# Patient Record
Sex: MALE | Race: WHITE | NOT HISPANIC OR LATINO | Employment: OTHER | URBAN - METROPOLITAN AREA
[De-identification: names, ages, dates, MRNs, and addresses within clinical notes are randomized per-mention and may not be internally consistent; named-entity substitution may affect disease eponyms.]

---

## 2017-02-17 DIAGNOSIS — I25.118 CORONARY ARTERY DISEASE INVOLVING NATIVE CORONARY ARTERY OF NATIVE HEART WITH OTHER FORM OF ANGINA PECTORIS (H): ICD-10-CM

## 2017-04-18 DIAGNOSIS — I25.118 CORONARY ARTERY DISEASE INVOLVING NATIVE CORONARY ARTERY OF NATIVE HEART WITH OTHER FORM OF ANGINA PECTORIS (H): ICD-10-CM

## 2017-04-18 RX ORDER — METOPROLOL SUCCINATE 25 MG/1
25 TABLET, EXTENDED RELEASE ORAL AT BEDTIME
Qty: 90 TABLET | Refills: 1 | Status: SHIPPED | OUTPATIENT
Start: 2017-04-18 | End: 2017-09-14

## 2017-04-26 ENCOUNTER — PRE VISIT (OUTPATIENT)
Dept: CARDIOLOGY | Facility: CLINIC | Age: 77
End: 2017-04-26

## 2017-04-26 DIAGNOSIS — I10 ESSENTIAL HYPERTENSION, BENIGN: ICD-10-CM

## 2017-04-26 DIAGNOSIS — I25.118 CORONARY ARTERY DISEASE INVOLVING NATIVE CORONARY ARTERY OF NATIVE HEART WITH OTHER FORM OF ANGINA PECTORIS (H): ICD-10-CM

## 2017-04-26 PROBLEM — E78.00 PURE HYPERCHOLESTEROLEMIA: Status: ACTIVE | Noted: 2017-04-26

## 2017-04-26 RX ORDER — ATORVASTATIN CALCIUM 80 MG/1
80 TABLET, FILM COATED ORAL AT BEDTIME
Qty: 90 TABLET | Refills: 1 | Status: SHIPPED | OUTPATIENT
Start: 2017-04-26 | End: 2017-09-14

## 2017-05-01 ENCOUNTER — TELEPHONE (OUTPATIENT)
Dept: FAMILY MEDICINE | Facility: CLINIC | Age: 77
End: 2017-05-01

## 2017-05-01 NOTE — TELEPHONE ENCOUNTER
Dr Martínez please advise.     Henrry from dental office calling to ask if patient needs to continue with RX abx for dental prophylaxis.     Patient reports joint replacement x 7 years ago. No listed in epic.   2001:   Rt knee arthroscopy and menisectomy    Need to return call to dental office.   [phone]    Damaris Whitley RN, BSN

## 2017-05-02 NOTE — TELEPHONE ENCOUNTER
Dental office is calling back and they need a letter stating that  He no longer needs the meds for dental work.  Please fax to #[phone]  Dr Pierre dental office.

## 2017-05-02 NOTE — TELEPHONE ENCOUNTER
Left detailed message of below on Dental voicemail, and to call back if further questions.  Whitney Echeverria RN

## 2017-05-02 NOTE — TELEPHONE ENCOUNTER
Let him know these are the ADA guidedlines, [employer] dental [employer]n, so he should simply follow their guidelines    Thanks    Zac Martínez M.D.

## 2017-05-15 ENCOUNTER — HOSPITAL ENCOUNTER (OUTPATIENT)
Dept: CARDIOLOGY | Facility: CLINIC | Age: 77
Discharge: HOME | End: 2017-05-15
Attending: INTERNAL MEDICINE | Admitting: INTERNAL MEDICINE
Payer: COMMERCIAL

## 2017-05-15 DIAGNOSIS — R10.13 EPIGASTRIC DISCOMFORT: ICD-10-CM

## 2017-05-15 DIAGNOSIS — R94.39 ABNORMAL CARDIOVASCULAR STRESS TEST: ICD-10-CM

## 2017-05-15 DIAGNOSIS — I10 ESSENTIAL HYPERTENSION WITH GOAL BLOOD PRESSURE LESS THAN 140/90: ICD-10-CM

## 2017-05-15 DIAGNOSIS — E78.00 PURE HYPERCHOLESTEROLEMIA: ICD-10-CM

## 2017-05-15 DIAGNOSIS — I10 ESSENTIAL HYPERTENSION, BENIGN: ICD-10-CM

## 2017-05-15 DIAGNOSIS — I25.118 CORONARY ARTERY DISEASE INVOLVING NATIVE CORONARY ARTERY OF NATIVE HEART WITH OTHER FORM OF ANGINA PECTORIS (H): ICD-10-CM

## 2017-05-15 DIAGNOSIS — J44.9 CHRONIC OBSTRUCTIVE PULMONARY DISEASE, UNSPECIFIED COPD TYPE (H): ICD-10-CM

## 2017-05-15 DIAGNOSIS — J47.9 BRONCHIECTASIS WITHOUT COMPLICATION (H): ICD-10-CM

## 2017-05-15 LAB
ALT SERPL W P-5'-P-CCNC: <5 U/L (ref 5–30)
ANION GAP SERPL CALCULATED.3IONS-SCNC: 12.8 MMOL/L (ref 6–17)
BUN SERPL-MCNC: 16 MG/DL (ref 7–30)
CALCIUM SERPL-MCNC: 9 MG/DL (ref 8.5–10.5)
CHLORIDE SERPL-SCNC: 105 MMOL/L (ref 98–107)
CHOLEST SERPL-MCNC: 178 MG/DL
CO2 SERPL-SCNC: 25 MMOL/L (ref 23–29)
CREAT SERPL-MCNC: 0.97 MG/DL (ref 0.7–1.3)
GFR SERPL CREATININE-BSD FRML MDRD: 75 ML/MIN/1.7M2
GLUCOSE SERPL-MCNC: 114 MG/DL (ref 70–105)
HDLC SERPL-MCNC: 64 MG/DL
LDLC SERPL CALC-MCNC: 98 MG/DL
NONHDLC SERPL-MCNC: 114 MG/DL
POTASSIUM SERPL-SCNC: 3.8 MMOL/L (ref 3.5–5.1)
SODIUM SERPL-SCNC: 139 MMOL/L (ref 136–145)
TRIGL SERPL-MCNC: 82 MG/DL

## 2017-05-15 PROCEDURE — 80061 LIPID PANEL: CPT | Performed by: INTERNAL MEDICINE

## 2017-05-15 PROCEDURE — A9502 TC99M TETROFOSMIN: HCPCS | Performed by: INTERNAL MEDICINE

## 2017-05-15 PROCEDURE — 78452 HT MUSCLE IMAGE SPECT MULT: CPT

## 2017-05-15 PROCEDURE — 78452 HT MUSCLE IMAGE SPECT MULT: CPT | Mod: 26 | Performed by: INTERNAL MEDICINE

## 2017-05-15 PROCEDURE — 36415 COLL VENOUS BLD VENIPUNCTURE: CPT | Performed by: INTERNAL MEDICINE

## 2017-05-15 PROCEDURE — 80048 BASIC METABOLIC PNL TOTAL CA: CPT | Performed by: INTERNAL MEDICINE

## 2017-05-15 PROCEDURE — 93018 CV STRESS TEST I&R ONLY: CPT | Performed by: INTERNAL MEDICINE

## 2017-05-15 PROCEDURE — 25000128 H RX IP 250 OP 636: Performed by: INTERNAL MEDICINE

## 2017-05-15 PROCEDURE — 34300033 ZZH RX 343: Performed by: INTERNAL MEDICINE

## 2017-05-15 PROCEDURE — 93016 CV STRESS TEST SUPVJ ONLY: CPT | Performed by: INTERNAL MEDICINE

## 2017-05-15 PROCEDURE — 84460 ALANINE AMINO (ALT) (SGPT): CPT | Performed by: INTERNAL MEDICINE

## 2017-05-15 RX ORDER — REGADENOSON 0.08 MG/ML
0.4 INJECTION, SOLUTION INTRAVENOUS ONCE
Status: COMPLETED | OUTPATIENT
Start: 2017-05-15 | End: 2017-05-15

## 2017-05-15 RX ORDER — ACYCLOVIR 200 MG/1
0-1 CAPSULE ORAL
Status: DISCONTINUED | OUTPATIENT
Start: 2017-05-15 | End: 2017-05-16 | Stop reason: HOSPADM

## 2017-05-15 RX ORDER — AMINOPHYLLINE 25 MG/ML
50-100 INJECTION, SOLUTION INTRAVENOUS
Status: DISCONTINUED | OUTPATIENT
Start: 2017-05-15 | End: 2017-05-16 | Stop reason: HOSPADM

## 2017-05-15 RX ORDER — ALBUTEROL SULFATE 90 UG/1
2 AEROSOL, METERED RESPIRATORY (INHALATION) EVERY 5 MIN PRN
Status: DISCONTINUED | OUTPATIENT
Start: 2017-05-15 | End: 2017-05-16 | Stop reason: HOSPADM

## 2017-05-15 RX ADMIN — TETROFOSMIN 12.16 MCI.: 0.23 INJECTION, POWDER, LYOPHILIZED, FOR SOLUTION INTRAVENOUS at 10:56

## 2017-05-15 RX ADMIN — REGADENOSON 0.4 MG: 0.08 INJECTION, SOLUTION INTRAVENOUS at 10:54

## 2017-05-15 RX ADMIN — TETROFOSMIN 4.26 MCI.: 0.23 INJECTION, POWDER, LYOPHILIZED, FOR SOLUTION INTRAVENOUS at 09:18

## 2017-05-17 ENCOUNTER — OFFICE VISIT (OUTPATIENT)
Dept: CARDIOLOGY | Facility: CLINIC | Age: 77
End: 2017-05-17
Attending: INTERNAL MEDICINE
Payer: COMMERCIAL

## 2017-05-17 VITALS
SYSTOLIC BLOOD PRESSURE: 114 MMHG | HEART RATE: 72 BPM | BODY MASS INDEX: 29.54 KG/M2 | DIASTOLIC BLOOD PRESSURE: 64 MMHG | WEIGHT: 211 LBS | HEIGHT: 71 IN

## 2017-05-17 DIAGNOSIS — J47.9 BRONCHIECTASIS WITHOUT COMPLICATION (H): ICD-10-CM

## 2017-05-17 DIAGNOSIS — I10 ESSENTIAL HYPERTENSION WITH GOAL BLOOD PRESSURE LESS THAN 140/90: ICD-10-CM

## 2017-05-17 DIAGNOSIS — R10.13 EPIGASTRIC DISCOMFORT: ICD-10-CM

## 2017-05-17 DIAGNOSIS — E78.00 PURE HYPERCHOLESTEROLEMIA: ICD-10-CM

## 2017-05-17 DIAGNOSIS — I25.118 CORONARY ARTERY DISEASE INVOLVING NATIVE CORONARY ARTERY OF NATIVE HEART WITH OTHER FORM OF ANGINA PECTORIS (H): ICD-10-CM

## 2017-05-17 DIAGNOSIS — J44.9 CHRONIC OBSTRUCTIVE PULMONARY DISEASE, UNSPECIFIED COPD TYPE (H): ICD-10-CM

## 2017-05-17 DIAGNOSIS — R94.39 ABNORMAL CARDIOVASCULAR STRESS TEST: ICD-10-CM

## 2017-05-17 DIAGNOSIS — I10 ESSENTIAL HYPERTENSION, BENIGN: ICD-10-CM

## 2017-05-17 PROCEDURE — 99214 OFFICE O/P EST MOD 30 MIN: CPT | Performed by: INTERNAL MEDICINE

## 2017-05-17 NOTE — MR AVS SNAPSHOT
After Visit Summary   5/17/2017    Romel Bonilla    MRN: [id number]           Patient Information     Date Of Birth          1940        Visit Information        Provider Department      5/17/2017 8:15 AM Awais Mathew MD [facility]        Today's Diagnoses     Essential hypertension with goal blood pressure less than 140/90        Coronary artery disease involving native coronary artery of native heart with other form of angina pectoris (H)        Epigastric discomfort        Bronchiectasis without complication (H)        Pure hypercholesterolemia        Abnormal cardiovascular stress test        HYPERTENSION        Chronic obstructive pulmonary disease, unspecified COPD type (H)           Follow-ups after your visit        Additional Services     Follow-Up with Cardiologist                 Future tests that were ordered for you today     Open Future Orders        Priority Expected Expires Ordered    Basic metabolic panel Routine 9/14/2017 5/17/2018 5/17/2017    Lipid Profile Routine 9/14/2017 5/17/2018 5/17/2017    ALT Routine 9/14/2017 5/17/2018 5/17/2017    Follow-Up with Cardiologist Routine 9/14/2017 5/17/2018 5/17/2017            Who to contact     If you have questions or need follow up information about today's clinic visit or your schedule please contact [facility] directly at [phone].  Normal or non-critical lab and imaging results will be communicated to you by MyChart, letter or phone within 4 business days after the clinic has received the results. If you do not hear from us within 7 days, please contact the clinic through MyChart or phone. If you have a critical or abnormal lab result, we will notify you by phone as soon as possible.  Submit refill requests through [employer] or call your pharmacy and they will forward the refill request to us. Please allow 3 business days for your  "refill to be completed.          Additional Information About Your Visit        Expahart Information     [facility] [employer] [employer] gives you secure access to your electronic health record. If you see a primary care provider, you can also send messages to your care team and make appointments. If you have questions, please call your primary care clinic.  If you do not have a primary care provider, please call [phone] and they will assist you.        Care EveryWhere ID     This is your Care EveryWhere ID. This could be used by other organizations to access your [place] medical records  [id number]        Your Vitals Were     Pulse Height BMI (Body Mass Index)             72 1.803 m (5' 11\") 29.43 kg/m2          Blood Pressure from Last 3 Encounters:   05/17/17 114/64   10/20/16 127/79   09/29/16 102/70    Weight from Last 3 Encounters:   05/17/17 95.7 kg (211 lb)   10/20/16 96.6 kg (213 lb)   09/29/16 97.1 kg (214 lb)              We Performed the Following     Follow-Up with Cardiologist        Primary Care Provider Office Phone # Fax #    Zac Martínez [phone]       [facility] [address]  [facility] [address]        Thank you!     Thank you for choosing [facility] [place]  for your care. Our goal is always to provide you with excellent care. Hearing back from our patients is one way we can continue to improve our services. Please take a few minutes to complete the written survey that you may receive in the mail after your visit with us. Thank you!             Your Updated Medication List - Protect others around you: Learn how to safely use, store and throw away your medicines at www.disposemymeds.org.          This list is accurate as of: 5/17/17  9:23 AM.  Always use your most recent med list.                   Brand Name Dispense Instructions for use    * albuterol 1.25 MG/3ML nebulizer solution    ACCUNEB     Take 1 vial by nebulization " every 6 hours as needed for shortness of breath / dyspnea or wheezing       * albuterol 108 (90 BASE) MCG/ACT Inhaler    PROAIR HFA/PROVENTIL HFA/VENTOLIN HFA     Inhale 2 puffs into the lungs every 4 hours as needed for shortness of breath / dyspnea or wheezing       ASPIRIN NOT PRESCRIBED    INTENTIONAL    0 each    Antiplatelet medication not prescribed intentionally due to Allergy       atorvastatin 80 MG tablet    LIPITOR    90 tablet    Take 1 tablet (80 mg) by mouth At Bedtime       calcium + D 600-200 MG-UNIT Tabs   Generic drug:  calcium carbonate-vitamin D     100 tablet    Take 1 tablet by mouth 2 times daily.       fluticasone 250 MCG/BLIST Aepb Inhaler    FLOVENT DISKUS     Inhale 2 puffs into the lungs every 12 hours       fluticasone 50 MCG/ACT spray    FLONASE    1 Bottle    Spray 1-2 sprays into both nostrils daily       metoprolol 25 MG 24 hr tablet    TOPROL XL    90 tablet    Take 1 tablet (25 mg) by mouth At Bedtime       MULTIVITAMIN TABS   OR      2 tabs daily       nitroglycerin 0.4 MG sublingual tablet    NITROSTAT    25 tablet    Place 1 tablet (0.4 mg) under the tongue every 5 minutes as needed for chest pain if you are still having symptoms after 3 doses (15 minutes) call 911.       OMEGA-3 FISH OIL PO      Take 1 g by mouth daily       omeprazole 20 MG CR capsule    priLOSEC     Take 20 mg by mouth daily as needed       ranitidine 150 MG tablet    ZANTAC     Take 150 mg by mouth nightly as needed       ticagrelor 90 MG tablet    BRILINTA    180 tablet    Take 1 tablet (90 mg) by mouth 2 times daily       * Notice:  This list has 2 medication(s) that are the same as other medications prescribed for you. Read the directions carefully, and ask your doctor or other care provider to review them with you.

## 2017-05-17 NOTE — LETTER
2017    Zac Martínez MD  [address]  [facility] [address]    RE: Romel Bonilla       Dear Colleague,    I had the pleasure of seeing Romel Bonilla in the [facility].    The patient is a 76-year-old, mildly overweight white male who presents to Cardiology Clinic for followup of a diagnosis of ischemic heart disease after undergoing cardiac catheterization and coronary angiography.  He had a 99% ostial right coronary artery, which required rotablation as well as aggressive PTCI and stent placement.  He has felt well since his last clinic visit with no recurrent symptoms of chest discomfort, shortness of breath, dizziness, palpitations, nausea, vomiting, diaphoresis or syncope.  He had previously an abnormal Cardiolite stress test, which shows some symptoms of dyspnea on exertion.  Risk factors are positive for a previous history of cigarette smoking, hypercholesterolemia and hypertension.  Negative for diabetes mellitus or family history of premature coronary artery disease.  He has snored in his sleep with weight ranging in the 220-230 range, but he has not been evaluated for sleep apnea and has had a significant weight loss of approximately 20 pounds mostly since his wife , who provided the cooking.  There is also a chronic history of bronchiectasis and COPD, for which he is treated with inhaler therapy and intermittent steroid administration as well as IV antibiotics.  Prior to cardiac intervention, he did have increase in dyspnea on exertion with epigastric heaviness and substernal discomfort that did not radiate to the back, shoulders and chest.  The cardiac catheterization led to right coronary artery intervention with mild to moderate disease of the left coronary system.  Intact left ventricular function has been noted in the past.  Lexiscan Cardiolite stress test performed this month demonstrated no evidence of ischemia or infarct.  Ejection fraction  was 54% with no significant regional wall motion abnormality noted.  No significant EKG changes were noted with the Lexiscan.  Previous echocardiogram done last year demonstrated a normal left ventricular systolic pressure with an ejection fraction 55%-60% with mild trileaflet aortic valve sclerosis, trace to mild aortic insufficiency with mild aortic root dilatation and mild ascending aortic dilatation.      MEDICATIONS:   1.  Atorvastatin 80 mg a day.   2.  Metoprolol XL 25 mg a day.   3.  Brilinta 90 mg twice a day.   4.  Aspirin not known because of intolerance.     5.  Flonase as directed.     6.  Flovent as directed.   7.  Nitroglycerin 0.4 mg sublingual p.r.n., which has not been used.   8.  Omeprazole 20 mg a day.   9.  Ranitidine 150 mg at night as needed.   10.  Albuterol nebulizer as directed.     11.  Albuterol inhaler as directed.     12.  Omega 3 fatty acids 1 g per day.   13.  Calcium carbonate with vitamin D 1 tablet twice a day.   14.  Multivitamins 2 tablets a day.      LABORATORY DATA:  Cholesterol 178, HDL 64, LDL 98, triglycerides 82.  Sodium 139, potassium 3.8, BUN 16, creatinine 0.97.  ALT less than 5.      The patient presents to Cardiology Clinic for followup of ischemic heart disease, hypertension and hyperlipidemia.      PHYSICAL EXAMINATION:   VITAL SIGNS:  Blood pressure is 114/64 with a heart rate of 70-80 and regular.  Weight is 211 pounds, which is down approximately 3 pounds since last clinic visit.   NECK:  Without jugular venous distention, carotid bruit or palpable thyroid.   CHEST:  Essentially clear to percussion and auscultation.  Decreased breath sounds at bases.  Prolonged expiratory phase and isolated scattered crackles.   CARDIAC:  Regular rhythm, soft S4 gallop, a 1/6 systolic murmur at the left sternal border radiating to the apex.  No diastolic murmur, rub or S3.   EXTREMITIES:  Without cyanosis or edema.      CLINICAL IMPRESSION:   1.  Stable cardiac condition.   2.   Ischemic heart disease, status post rotablation and PTCI with a stent placed in the ostial right coronary artery with moderate disease elsewhere.   3.  History of chronic bronchiectasis/chronic obstructive pulmonary disease with long cigarette smoking history.   4.  Hypertension, well controlled.   5.  Hyperlipidemia, not at goal due to dietary indiscretion.   6.  History of diffuse osteoarthritis.      DISCUSSION:  The patient has done well clinically with regard to his cardiac status since his last clinic visit with no symptoms of angina pectoris or congestive heart failure.  Serum lipids had been improved on high-dose Lipitor, but have subsequently worsened with an LDL in the 90s, presumably due to dietary indiscretion, primarily egg yolks.  Options to either add Zetia 5-10 mg a day or to be stricter with diet were offered to the patient, who would like to try diet first with a recheck later in the year.  He does not want to restart aspirin at this time.  Brilinta, which has become less expensive, he would prefer to continue at this time.  He will need close followup of serum lipids, basic metabolic panel and blood pressure.  He may be a candidate for a sleep evaluation later in the year and will continue aggressive therapy of his gastroesophageal reflux and COPD/bronchiectasis.  Overall, he appears stable.      RECOMMENDATIONS:   1.  Continue present medications.   2.  Close followup of serum lipids, basic metabolic panel and blood pressure.   3.  Aggressive pulmonary therapy.   4.  Aggressive GI therapy for GERD.   5.  Consider echocardiography later in the year to follow left ventricular function.   6.  Consider sleep study.   7.  Diet and exercise as tolerated, avoiding cholesterol-rich foods.   8.  Routine medical followup.   9.  Cardiology followup in 4-6 months.     Again, thank you for allowing me to participate in the care of your patient.      Sincerely,    Awais Mathew MD     [facility]  [facility]

## 2017-05-18 NOTE — PROGRESS NOTES
HISTORY OF PRESENT ILLNESS:  The patient is a 76-year-old, mildly overweight white male who presents to Cardiology Clinic for followup of a diagnosis of ischemic heart disease after undergoing cardiac catheterization and coronary angiography.  He had a 99% ostial right coronary artery, which required rotablation as well as aggressive PTCI and stent placement.  He has felt well since his last clinic visit with no recurrent symptoms of chest discomfort, shortness of breath, dizziness, palpitations, nausea, vomiting, diaphoresis or syncope.  He had previously an abnormal Cardiolite stress test, which shows some symptoms of dyspnea on exertion.  Risk factors are positive for a previous history of cigarette smoking, hypercholesterolemia and hypertension.  Negative for diabetes mellitus or family history of premature coronary artery disease.  He has snored in his sleep with weight ranging in the 220-230 range, but he has not been evaluated for sleep apnea and has had a significant weight loss of approximately 20 pounds mostly since his wife , who provided the cooking.  There is also a chronic history of bronchiectasis and COPD, for which he is treated with inhaler therapy and intermittent steroid administration as well as IV antibiotics.  Prior to cardiac intervention, he did have increase in dyspnea on exertion with epigastric heaviness and substernal discomfort that did not radiate to the back, shoulders and chest.  The cardiac catheterization led to right coronary artery intervention with mild to moderate disease of the left coronary system.  Intact left ventricular function has been noted in the past.  Lexiscan Cardiolite stress test performed this month demonstrated no evidence of ischemia or infarct.  Ejection fraction was 54% with no significant regional wall motion abnormality noted.  No significant EKG changes were noted with the Lexiscan.  Previous echocardiogram done last year demonstrated a normal left  ventricular systolic pressure with an ejection fraction 55%-60% with mild trileaflet aortic valve sclerosis, trace to mild aortic insufficiency with mild aortic root dilatation and mild ascending aortic dilatation.      MEDICATIONS:   1.  Atorvastatin 80 mg a day.   2.  Metoprolol XL 25 mg a day.   3.  Brilinta 90 mg twice a day.   4.  Aspirin not known because of intolerance.     5.  Flonase as directed.     6.  Flovent as directed.   7.  Nitroglycerin 0.4 mg sublingual p.r.n., which has not been used.   8.  Omeprazole 20 mg a day.   9.  Ranitidine 150 mg at night as needed.   10.  Albuterol nebulizer as directed.     11.  Albuterol inhaler as directed.     12.  Omega 3 fatty acids 1 g per day.   13.  Calcium carbonate with vitamin D 1 tablet twice a day.   14.  Multivitamins 2 tablets a day.      LABORATORY DATA:  Cholesterol 178, HDL 64, LDL 98, triglycerides 82.  Sodium 139, potassium 3.8, BUN 16, creatinine 0.97.  ALT less than 5.      The patient presents to Cardiology Clinic for followup of ischemic heart disease, hypertension and hyperlipidemia.      PHYSICAL EXAMINATION:   VITAL SIGNS:  Blood pressure is 114/64 with a heart rate of 70-80 and regular.  Weight is 211 pounds, which is down approximately 3 pounds since last clinic visit.   NECK:  Without jugular venous distention, carotid bruit or palpable thyroid.   CHEST:  Essentially clear to percussion and auscultation.  Decreased breath sounds at bases.  Prolonged expiratory phase and isolated scattered crackles.   CARDIAC:  Regular rhythm, soft S4 gallop, a 1/6 systolic murmur at the left sternal border radiating to the apex.  No diastolic murmur, rub or S3.   EXTREMITIES:  Without cyanosis or edema.      CLINICAL IMPRESSION:   1.  Stable cardiac condition.   2.  Ischemic heart disease, status post rotablation and PTCI with a stent placed in the ostial right coronary artery with moderate disease elsewhere.   3.  History of chronic bronchiectasis/chronic  obstructive pulmonary disease with long cigarette smoking history.   4.  Hypertension, well controlled.   5.  Hyperlipidemia, not at goal due to dietary indiscretion.   6.  History of diffuse osteoarthritis.      DISCUSSION:  The patient has done well clinically with regard to his cardiac status since his last clinic visit with no symptoms of angina pectoris or congestive heart failure.  Serum lipids had been improved on high-dose Lipitor, but have subsequently worsened with an LDL in the 90s, presumably due to dietary indiscretion, primarily egg yolks.  Options to either add Zetia 5-10 mg a day or to be stricter with diet were offered to the patient, who would like to try diet first with a recheck later in the year.  He does not want to restart aspirin at this time.  Brilinta, which has become less expensive, he would prefer to continue at this time.  He will need close followup of serum lipids, basic metabolic panel and blood pressure.  He may be a candidate for a sleep evaluation later in the year and will continue aggressive therapy of his gastroesophageal reflux and COPD/bronchiectasis.  Overall, he appears stable.      RECOMMENDATIONS:   1.  Continue present medications.   2.  Close followup of serum lipids, basic metabolic panel and blood pressure.   3.  Aggressive pulmonary therapy.   4.  Aggressive GI therapy for GERD.   5.  Consider echocardiography later in the year to follow left ventricular function.   6.  Consider sleep study.   7.  Diet and exercise as tolerated, avoiding cholesterol-rich foods.   8.  Routine medical followup.   9.  Cardiology followup in 4-6 months.      cc:   Zac [place] [address]         SHAE VALVERDE MD, [facility]             D: 2017 11:03   T: 2017 12:01   MT: [email or web address]      Name:     WESLEY SPARKS   MRN:      [id number]        Account:      [id number]   :      1940           Service Date:  05/17/2017      Document: [id number]

## 2017-07-26 ENCOUNTER — TELEPHONE (OUTPATIENT)
Dept: CARDIOLOGY | Facility: CLINIC | Age: 77
End: 2017-07-26

## 2017-07-26 NOTE — TELEPHONE ENCOUNTER
Patient called requesting a change in his appointment scheduled 9-6-17 with Dr. Mathew. Patient will be out of town that day. Transferred to scheduling for OV 9-25-17.

## 2017-08-23 DIAGNOSIS — I25.118 CORONARY ARTERY DISEASE INVOLVING NATIVE CORONARY ARTERY OF NATIVE HEART WITH OTHER FORM OF ANGINA PECTORIS (H): ICD-10-CM

## 2017-09-06 ENCOUNTER — PRE VISIT (OUTPATIENT)
Dept: CARDIOLOGY | Facility: CLINIC | Age: 77
End: 2017-09-06

## 2017-09-13 NOTE — PROGRESS NOTES
SUBJECTIVE:   Romel Bonilla is a 76 year old male who presents for Preventive Visit.    The patient is doing very well, he is walking reg, weight stable.  Using asmanex now, not seeing pulm but does have reg follow up with Dr. Gentile of urol and cards.  Per patient psa undetect this year.  NO chest pain or shortness of breath, using inhaler once a day and rare use of albuterol.  Occasionally some bm irreg for years, not new or changed.  No other c/o.  , lives [place] over 6 months of the year, [place].               Past Medical History:      Past Medical History:   Diagnosis Date     Anxiety      Aortic root dilation (H)      Ascending aorta dilatation (H)      Asthma     Dr. Lennox     COPD (chronic obstructive pulmonary disease) (H)     bronchiectasis on multiple inhalers      Coronary artery disease     Cath 3/14/16- 95% ostail RCA stenosis>>CECILIO placed, fu nuclear est 5/17 nl     Essential hypertension, benign     off medications with life style     GERD (gastroesophageal reflux disease)      Groin hematoma     right- 3/2016 post angio     Hoarseness      Hyperglycemia      Hyperlipidemia LDL goal <70      Lung nodule 2012    indeterminate-3 mm-needs 12 month follow up; fu done 3/16 at [facility] and benign, no fu needed     Obesity, unspecified      Osteoarthrosis, unspecified whether generalized or localized, unspecified site      Prostate CA (H) 2012    metastatic-guerita's score 7, seeds and xrt, now seeing Dr. Gentile     Urinary retention 2016    due to asa             Past Surgical History:      Past Surgical History:   Procedure Laterality Date     C NONSPECIFIC PROCEDURE  7/01    Rt knee arthroscopy and menisectomy     C NONSPECIFIC PROCEDURE  2000    RT HERNIA REPAIR     CATARACT IOL, RT/LT  2016     HEART CATH LEFT HEART CATH  3/14/16    95% ostail RCA stenosis>>CECILIO placed     INSERT RADIATION SEEDS PROSTATE  7/10/2012    seeds and xrt     ROTATOR CUFF REPAIR RT/LT  2016             Social  History:     Social History     Social History     Marital status:      Spouse name: N/A     Number of children: 4     Years of education: N/A     Occupational History     sold insurance      Social History Main Topics     Smoking status: Former Smoker     Packs/day: 1.00     Years: 20.00     Types: Cigarettes     Quit date: 1/26/1994     Smokeless tobacco: Never Used     Alcohol use 0.0 oz/week     0 Standard drinks or equivalent per week      Comment: occ beer     Drug use: No     Sexual activity: Yes     Partners: Female     Other Topics Concern     Caffeine Concern No     occ      Sleep Concern Yes     due to wife passing away a month ago     Stress Concern No     Weight Concern No     Special Diet No     Exercise No     not currently, starting cardiac rehab soon      Seat Belt Yes     Social History Narrative             Family History:   reviewed         Allergies:     Allergies   Allergen Reactions     Cefprozil Diarrhea     Aspirin      Urinary retention     Plavix [Clopidogrel] Itching             Medications:     Current Outpatient Prescriptions   Medication Sig Dispense Refill     mometasone (ASMANEX) 110 MCG/INH inhaler Inhale 1 puff into the lungs every evening 3 Inhaler 3     atorvastatin (LIPITOR) 80 MG tablet Take 1 tablet (80 mg) by mouth At Bedtime 90 tablet 3     metoprolol (TOPROL XL) 25 MG 24 hr tablet Take 1 tablet (25 mg) by mouth At Bedtime 90 tablet 3     fluticasone (FLONASE) 50 MCG/ACT spray Spray 1-2 sprays into both nostrils daily 1 Bottle 3     ticagrelor (BRILINTA) 90 MG tablet Take 1 tablet (90 mg) by mouth 2 times daily 180 tablet 1     albuterol (PROAIR HFA, PROVENTIL HFA, VENTOLIN HFA) 108 (90 BASE) MCG/ACT inhaler Inhale 2 puffs into the lungs every 4 hours as needed for shortness of breath / dyspnea or wheezing       Calcium Carbonate-Vitamin D (CALCIUM + D) 600-200 MG-UNIT per tablet Take 1 tablet by mouth 2 times daily. 100 tablet 12     MULTIVITAMIN TABS   OR 2 tabs  "daily       [DISCONTINUED] mometasone (ASMANEX) 110 MCG/INH inhaler Inhale 1 puff into the lungs every evening       [DISCONTINUED] atorvastatin (LIPITOR) 80 MG tablet Take 1 tablet (80 mg) by mouth At Bedtime 90 tablet 1     [DISCONTINUED] metoprolol (TOPROL XL) 25 MG 24 hr tablet Take 1 tablet (25 mg) by mouth At Bedtime 90 tablet 1     ASPIRIN NOT PRESCRIBED (INTENTIONAL) Antiplatelet medication not prescribed intentionally due to Allergy (Patient not taking: Reported on 9/14/2017) 0 each 0     nitroglycerin (NITROSTAT) 0.4 MG SL tablet Place 1 tablet (0.4 mg) under the tongue every 5 minutes as needed for chest pain if you are still having symptoms after 3 doses (15 minutes) call 911. (Patient not taking: Reported on 9/14/2017) 25 tablet 1     ranitidine (ZANTAC) 150 MG tablet Take 150 mg by mouth nightly as needed        Omega-3 Fatty Acids (OMEGA-3 FISH OIL PO) Take 1 g by mouth daily                 Review of Systems:   The 10 point Review of Systems is negative other than noted in the HPI           Physical Exam:   Blood pressure 127/64, pulse 67, temperature 98.2  F (36.8  C), temperature source Oral, height 5' 11\" (1.803 m), weight 211 lb (95.7 kg), SpO2 99 %.    Exam:  Constitutional: healthy appearing, alert and in no distress  Heent: Normocephalic. Head without obvious masses or lesions. PERRLDC, EOMI. Mouth exam within normal limits: tongue, mucous membranes, posterior pharynx all normal, no lesions or abnormalities seen.  Tm's and canals within normal limits bilaterally. Neck supple, no nuchal rigidity or masses. No supraclavicular, or cervical adenopathy. Thyroid symmetric, no masses.  Cardiovascular: Regular rate and rhythm, no murmer, rub or gallops.  JVP not elevated, no edema.  Carotids within normal limits bilaterally, no bruits.  Respiratory: Normal respiratory effort.  Lungs clear, normal flow, no wheezing or crackles.  Breasts: Normal bilaterally.  No masses or lesions.  Nipples within normal " limites.  No axillary lesions or nodes.  Gastrointestinal: Normal active bowel sounds.   Soft, not tender, no masses, guarding or rebound.  No hepatosplenomegaly.   Genitourinary: Rectal per urol  Musculoskeletal: extremities normal, no gross deformities noted.  Skin: no suspicious lesions or rashes   Neurologic: Mental status within normal limits.  Speech fluent.  No gross motor abnormalities and gait intact.  Psychiatric: mentation appears normal and affect normal.         Data:   Labs sent        Assessment:   1. Normal cpx  2. Copd/asthma, stable, call if changes  3. Ascvd, doing well on med therapy  4. Elevated cholesterol on high dose statin  5. Hypertension, no issues  6. Lung nodule, benign in follow up  7. Cap, gisella, follow up urol  8. Gerd, no issues  9. asc aortic dil, follow up cards  10. Urine retention with asa, off it, just on brilenta  11. hcm         Plan:   Flu shot  Exercise, diet  Letter with labs  Call if problems      Zac Martínez M.D.            Are you in the first 12 months of your Medicare Part B coverage?  No    Healthy Habits:    Do you get at least three servings of calcium containing foods daily (dairy, green leafy vegetables, etc.)? no    Amount of exercise or daily activities, outside of work: 7 day(s) per week    Problems taking medications regularly No    Medication side effects: No    Have you had an eye exam in the past two years? yes    Do you see a dentist twice per year? no    Do you have sleep apnea, excessive snoring or daytime drowsiness?no    COGNITIVE SCREEN  1) Repeat 3 items (Banana, Sunrise, Chair)    2) Clock draw:   3) 3 item recall:   Results: 3 items recalled: COGNITIVE IMPAIRMENT LESS LIKELY    Mini-CogTM Copyright S Norah. Licensed by the author for use in [facility]; reprinted with permission (dontae@.[facility]). All rights reserved.                    Reviewed and updated as needed this visit by clinical staff         Reviewed and updated as needed this  "visit by Provider      Social History   Substance Use Topics     Smoking status: Former Smoker     Packs/day: 1.00     Years: 20.00     Types: Cigarettes     Quit date: 1/26/1994     Smokeless tobacco: Never Used     Alcohol use 0.0 oz/week     0 Standard drinks or equivalent per week      Comment: occ beer       The patient does not drink >3 drinks per day nor >7 drinks per week.    Today's PHQ-2 Score:   PHQ-2 ( 1999 Pfizer) 10/20/2016 9/27/2016   Q1: Little interest or pleasure in doing things 0 0   Q2: Feeling down, depressed or hopeless 0 0   PHQ-2 Score 0 0         Do you feel safe in your environment - Yes    Do you have a Health Care Directive?: No: Advance care planning was reviewed with patient; patient declined at this time.      Current providers sharing in care for this patient include: Patient Care Team:  Zac Martínez MD as PCP - General (Internal Medicine)  Ti Gentile MD as MD (Urology)      Hearing impairment: Yes,     Ability to successfully perform activities of daily living: Yes, no assistance needed     Fall risk:         Home safety:        The following health maintenance items are reviewed in Epic and correct as of today:Health Maintenance   Topic Date Due     ADVANCE DIRECTIVE PLANNING Q5 YRS  02/01/2017     INFLUENZA VACCINE (SYSTEM ASSIGNED)  09/01/2017     FALL RISK ASSESSMENT  10/20/2017     LIPID SCREEN Q5 YR MALE (SYSTEM ASSIGNED)  05/15/2022     TETANUS IMMUNIZATION (SYSTEM ASSIGNED)  06/25/2022     PNEUMOCOCCAL  Completed       End of Life Planning:  Patient currently has an advanced directive: none, I rec it    COUNSELING:  Reviewed preventive health counseling, as reflected in patient instructions       Regular exercise       Healthy diet/nutrition        Estimated body mass index is 29.43 kg/(m^2) as calculated from the following:    Height as of 5/17/17: 5' 11\" (1.803 m).    Weight as of 5/17/17: 211 lb (95.7 kg).     reports that he quit smoking about 23 years " ago. His smoking use included Cigarettes. He has a 20.00 pack-year smoking history. He has never used smokeless tobacco.        Appropriate preventive services were discussed with this patient, including applicable screening as appropriate for cardiovascular disease, diabetes, osteopenia/osteoporosis, and glaucoma.  As appropriate for age/gender, discussed screening for colorectal cancer, prostate cancer, breast cancer, and cervical cancer. Checklist reviewing preventive services available has been given to the patient.    Reviewed patients plan of care and provided an AVS. The Basic Care Plan (routine screening as documented in Health Maintenance) for Romel meets the Care Plan requirement. This Care Plan has been established and reviewed with the Patient.    Counseling Resources:  ATP IV Guidelines  Pooled Cohorts Equation Calculator  Breast Cancer Risk Calculator  FRAX Risk Assessment  ICSI Preventive Guidelines  Dietary Guidelines for Americans, 2010  USDA's MyPlate  ASA Prophylaxis  Lung CA Screening    Zac Martínez MD  [facility]

## 2017-09-14 ENCOUNTER — OFFICE VISIT (OUTPATIENT)
Dept: FAMILY MEDICINE | Facility: CLINIC | Age: 77
End: 2017-09-14
Payer: COMMERCIAL

## 2017-09-14 VITALS
BODY MASS INDEX: 29.54 KG/M2 | HEART RATE: 67 BPM | DIASTOLIC BLOOD PRESSURE: 64 MMHG | OXYGEN SATURATION: 99 % | TEMPERATURE: 98.2 F | SYSTOLIC BLOOD PRESSURE: 127 MMHG | WEIGHT: 211 LBS | HEIGHT: 71 IN

## 2017-09-14 DIAGNOSIS — R91.1 LUNG NODULE: ICD-10-CM

## 2017-09-14 DIAGNOSIS — C61 PROSTATE CA (H): ICD-10-CM

## 2017-09-14 DIAGNOSIS — I77.810 ASCENDING AORTA DILATATION (H): ICD-10-CM

## 2017-09-14 DIAGNOSIS — I10 ESSENTIAL HYPERTENSION, BENIGN: ICD-10-CM

## 2017-09-14 DIAGNOSIS — K21.9 GASTROESOPHAGEAL REFLUX DISEASE WITHOUT ESOPHAGITIS: ICD-10-CM

## 2017-09-14 DIAGNOSIS — I25.10 CORONARY ARTERY DISEASE INVOLVING NATIVE CORONARY ARTERY OF NATIVE HEART WITHOUT ANGINA PECTORIS: ICD-10-CM

## 2017-09-14 DIAGNOSIS — I25.118 CORONARY ARTERY DISEASE INVOLVING NATIVE CORONARY ARTERY OF NATIVE HEART WITH OTHER FORM OF ANGINA PECTORIS (H): ICD-10-CM

## 2017-09-14 DIAGNOSIS — R49.0 RASPY VOICE: ICD-10-CM

## 2017-09-14 DIAGNOSIS — Z00.00 ROUTINE GENERAL MEDICAL EXAMINATION AT A HEALTH CARE FACILITY: Primary | ICD-10-CM

## 2017-09-14 DIAGNOSIS — J44.9 CHRONIC OBSTRUCTIVE PULMONARY DISEASE, UNSPECIFIED COPD TYPE (H): ICD-10-CM

## 2017-09-14 DIAGNOSIS — R73.9 HYPERGLYCEMIA: ICD-10-CM

## 2017-09-14 DIAGNOSIS — R09.81 CONGESTION OF PARANASAL SINUS: ICD-10-CM

## 2017-09-14 DIAGNOSIS — F41.9 ANXIETY: ICD-10-CM

## 2017-09-14 DIAGNOSIS — E78.00 PURE HYPERCHOLESTEROLEMIA: ICD-10-CM

## 2017-09-14 DIAGNOSIS — I10 BENIGN ESSENTIAL HYPERTENSION: ICD-10-CM

## 2017-09-14 DIAGNOSIS — Z23 NEED FOR PROPHYLACTIC VACCINATION AND INOCULATION AGAINST INFLUENZA: ICD-10-CM

## 2017-09-14 DIAGNOSIS — J45.30 MILD PERSISTENT ASTHMA WITHOUT COMPLICATION: ICD-10-CM

## 2017-09-14 DIAGNOSIS — R33.9 URINARY RETENTION: ICD-10-CM

## 2017-09-14 LAB
ALBUMIN SERPL-MCNC: 3.8 G/DL (ref 3.4–5)
ALP SERPL-CCNC: 87 U/L (ref 40–150)
ALT SERPL W P-5'-P-CCNC: 32 U/L (ref 0–70)
ANION GAP SERPL CALCULATED.3IONS-SCNC: 10 MMOL/L (ref 3–14)
AST SERPL W P-5'-P-CCNC: 21 U/L (ref 0–45)
BILIRUB SERPL-MCNC: 0.8 MG/DL (ref 0.2–1.3)
BUN SERPL-MCNC: 19 MG/DL (ref 7–30)
CALCIUM SERPL-MCNC: 9.2 MG/DL (ref 8.5–10.1)
CHLORIDE SERPL-SCNC: 109 MMOL/L (ref 94–109)
CHOLEST SERPL-MCNC: 163 MG/DL
CO2 SERPL-SCNC: 22 MMOL/L (ref 20–32)
CREAT SERPL-MCNC: 0.94 MG/DL (ref 0.66–1.25)
ERYTHROCYTE [DISTWIDTH] IN BLOOD BY AUTOMATED COUNT: 13.1 % (ref 10–15)
GFR SERPL CREATININE-BSD FRML MDRD: 78 ML/MIN/1.7M2
GLUCOSE SERPL-MCNC: 113 MG/DL (ref 70–99)
HBA1C MFR BLD: 5.6 % (ref 4.3–6)
HCT VFR BLD AUTO: 41.4 % (ref 40–53)
HDLC SERPL-MCNC: 73 MG/DL
HGB BLD-MCNC: 13.8 G/DL (ref 13.3–17.7)
LDLC SERPL CALC-MCNC: 74 MG/DL
MCH RBC QN AUTO: 32.6 PG (ref 26.5–33)
MCHC RBC AUTO-ENTMCNC: 33.3 G/DL (ref 31.5–36.5)
MCV RBC AUTO: 98 FL (ref 78–100)
NONHDLC SERPL-MCNC: 90 MG/DL
PLATELET # BLD AUTO: 252 10E9/L (ref 150–450)
POTASSIUM SERPL-SCNC: 4.1 MMOL/L (ref 3.4–5.3)
PROT SERPL-MCNC: 7.5 G/DL (ref 6.8–8.8)
RBC # BLD AUTO: 4.23 10E12/L (ref 4.4–5.9)
SODIUM SERPL-SCNC: 141 MMOL/L (ref 133–144)
TRIGL SERPL-MCNC: 78 MG/DL
WBC # BLD AUTO: 6.2 10E9/L (ref 4–11)

## 2017-09-14 PROCEDURE — 85027 COMPLETE CBC AUTOMATED: CPT | Performed by: INTERNAL MEDICINE

## 2017-09-14 PROCEDURE — 36415 COLL VENOUS BLD VENIPUNCTURE: CPT | Performed by: INTERNAL MEDICINE

## 2017-09-14 PROCEDURE — 83036 HEMOGLOBIN GLYCOSYLATED A1C: CPT | Performed by: INTERNAL MEDICINE

## 2017-09-14 PROCEDURE — 80053 COMPREHEN METABOLIC PANEL: CPT | Performed by: INTERNAL MEDICINE

## 2017-09-14 PROCEDURE — 90662 IIV NO PRSV INCREASED AG IM: CPT | Performed by: INTERNAL MEDICINE

## 2017-09-14 PROCEDURE — G0439 PPPS, SUBSEQ VISIT: HCPCS | Performed by: INTERNAL MEDICINE

## 2017-09-14 PROCEDURE — 80061 LIPID PANEL: CPT | Performed by: INTERNAL MEDICINE

## 2017-09-14 PROCEDURE — G0008 ADMIN INFLUENZA VIRUS VAC: HCPCS | Performed by: INTERNAL MEDICINE

## 2017-09-14 RX ORDER — ATORVASTATIN CALCIUM 80 MG/1
80 TABLET, FILM COATED ORAL AT BEDTIME
Qty: 90 TABLET | Refills: 3 | Status: SHIPPED | OUTPATIENT
Start: 2017-09-14 | End: 2017-09-25

## 2017-09-14 RX ORDER — FLUTICASONE PROPIONATE 50 MCG
1-2 SPRAY, SUSPENSION (ML) NASAL DAILY
Qty: 1 BOTTLE | Refills: 3 | Status: SHIPPED | OUTPATIENT
Start: 2017-09-14 | End: 2023-07-05

## 2017-09-14 RX ORDER — METOPROLOL SUCCINATE 25 MG/1
25 TABLET, EXTENDED RELEASE ORAL AT BEDTIME
Qty: 90 TABLET | Refills: 3 | Status: SHIPPED | OUTPATIENT
Start: 2017-09-14 | End: 2017-10-17

## 2017-09-14 NOTE — NURSING NOTE
"Chief Complaint   Patient presents with     Medicare Visit       Initial /64  Pulse 67  Temp 98.2  F (36.8  C) (Oral)  Ht 5' 11\" (1.803 m)  Wt 211 lb (95.7 kg)  SpO2 99%  BMI 29.43 kg/m2 Estimated body mass index is 29.43 kg/(m^2) as calculated from the following:    Height as of this encounter: 5' 11\" (1.803 m).    Weight as of this encounter: 211 lb (95.7 kg).  Medication Reconciliation: complete   Ni HAMILTON CMA      "

## 2017-09-14 NOTE — PROGRESS NOTES
Injectable Influenza Immunization Documentation    1.  Are you sick today? (Fever of 100.5 or higher on the day of the clinic)   No    2.  Have you ever had Guillain-[place] Syndrome within 6 weeks of an influenza vaccionation?  No    3. Do you have a life-threatening allergy to eggs?  No    4. Do you have a life-threatening allergy to a component of the vaccine? May include antibiotics, gelatin or latex.  No     5. Have you ever had a reaction to a dose of flu vaccine that needed immediate medical attention?  No     Form completed by Ni HAMILTON CMA

## 2017-09-14 NOTE — PROGRESS NOTES
Mr. Bonilla,    It was a pleasure seeing you for your physical examination.  I wanted to get back to you with your test results.  I have enclosed a copy for your review.      I am happy to report that your cbc or complete blood count is normal with no signs of anemia, leukemia or platelet abnormalities.  Your chemistry panel shows no diabetes but your sugar is elevated.  The best way to keep this down is through regular exercise and keeping your weight down.  We should check it yearly.  Your blood salts, kidney tests, liver tests, and proteins are all fine.    Your total cholesterol is 163 with the normal range being below 200.  Your HDL or good cholesterol is 73 with the normal range being above 40.  Your LDL or bad cholesterol is 74 with the normal range being below 130.  These numbers are very good.    I am happy to bring you this overall excellent report.  I believe your health is very good.  If you have any questions please call me.    Zac Martínez M.D.

## 2017-09-14 NOTE — MR AVS SNAPSHOT
After Visit Summary   9/14/2017    Romel Bonilla    MRN: [id number]           Patient Information     Date Of Birth          1940        Visit Information        Provider Department      9/14/2017 9:30 AM Zac Martínez MD [facility]        Today's Diagnoses     Routine general medical examination at a health care facility    -  1    Prostate CA (H)        Chronic obstructive pulmonary disease, unspecified COPD type (H)        Ascending aorta dilatation (H)        Pure hypercholesterolemia        Mild persistent asthma without complication        Gastroesophageal reflux disease without esophagitis        Benign essential hypertension        Lung nodule        Anxiety        Coronary artery disease involving native coronary artery of native heart without angina pectoris        Hyperglycemia        Urinary retention        Coronary artery disease involving native coronary artery of native heart with other form of angina pectoris (H)        HYPERTENSION        Raspy voice        Congestion of paranasal sinus          Care Instructions      Preventive Health Recommendations:       Male Ages 65 and over    Yearly exam:             See your health care provider every year in order to  o   Review health changes.   o   Discuss preventive care.    o   Review your medicines if your doctor has prescribed any.    Talk with your health care provider about whether you should have a test to screen for prostate cancer (PSA).    Every 3 years, have a diabetes test (fasting glucose). If you are at risk for diabetes, you should have this test more often.    Every 5 years, have a cholesterol test. Have this test more often if you are at risk for high cholesterol or heart disease.     Every 10 years, have a colonoscopy. Or, have a yearly FIT test (stool test). These exams will check for colon cancer.    Talk to with your health care provider about screening for Abdominal Aortic Aneurysm if you  have a family history of AAA or have a history of smoking.  Shots:     Get a flu shot each year.     Get a tetanus shot every 10 years.     Talk to your doctor about your pneumonia vaccines. There are now two you should receive - Pneumovax (PPSV 23) and Prevnar (PCV 13).    Talk to your doctor about a shingles vaccine.     Talk to your doctor about the hepatitis B vaccine.  Nutrition:     Eat at least 5 servings of fruits and vegetables each day.     Eat whole-grain bread, whole-wheat pasta and brown rice instead of white grains and rice.     Talk to your doctor about Calcium and Vitamin D.   Lifestyle    Exercise for at least 150 minutes a week (30 minutes a day, 5 days a week). This will help you control your weight and prevent disease.     Limit alcohol to one drink per day.     No smoking.     Wear sunscreen to prevent skin cancer.     See your dentist every six months for an exam and cleaning.     See your eye doctor every 1 to 2 years to screen for conditions such as glaucoma, macular degeneration and cataracts.          Follow-ups after your visit        Your next 10 appointments already scheduled     Sep 25, 2017  9:20 AM CDT   LAB with [facility]   [facility] PHYSIC[facility] [place] ([facility])    [address] [place] [id number] [phone]           Patient must bring picture ID. Patient should be prepared to give a urine specimen  Please do not eat 10-12 hours before your appointment if you are coming in fasting for labs on lipids, cholesterol, or glucose (sugar). Pregnant women should follow their Care Team instructions. Water with medications is okay. Do not drink coffee or other fluids. If you have concerns about taking  your medications, please ask at office or if scheduling via Well Done, send a message by clicking on Secure Messaging, Message Your Care Team.            Sep 25, 2017 10:15 AM CDT   Return Visit with Awais Mathew MD   [place]  "OF [facility] AT [place] ([facility])    [address]  [place] [address] [phone]              Who to contact     If you have questions or need follow up information about today's clinic visit or your schedule please contact [facility] directly at [phone].  Normal or non-critical lab and imaging results will be communicated to you by MyChart, letter or phone within 4 business days after the clinic has received the results. If you do not hear from us within 7 days, please contact the clinic through Testinhart or phone. If you have a critical or abnormal lab result, we will notify you by phone as soon as possible.  Submit refill requests through Parrable or call your pharmacy and they will forward the refill request to us. Please allow 3 business days for your refill to be completed.          Additional Information About Your Visit        Testinhar3dim Information     Parrable gives you secure access to your electronic health record. If you see a primary care provider, you can also send messages to your care team and make appointments. If you have questions, please call your primary care clinic.  If you do not have a primary care provider, please call [phone] and they will assist you.        Care EveryWhere ID     This is your Care EveryWhere ID. This could be used by other organizations to access your [facility] medical records  [id number]        Your Vitals Were     Pulse Temperature Height Pulse Oximetry BMI (Body Mass Index)       67 98.2  F (36.8  C) (Oral) 5' 11\" (1.803 m) 99% 29.43 kg/m2        Blood Pressure from Last 3 Encounters:   09/14/17 127/64   05/17/17 114/64   10/20/16 127/79    Weight from Last 3 Encounters:   09/14/17 211 lb (95.7 kg)   05/17/17 211 lb (95.7 kg)   10/20/16 213 lb (96.6 kg)              We Performed the Following     CBC with platelets     Comprehensive metabolic panel     HC FLU VACCINE, INCREASED ANTIGEN, PRESV FREE  "    Hemoglobin A1c     Lipid panel reflex to direct LDL          Today's Medication Changes          These changes are accurate as of: 9/14/17  9:46 AM.  If you have any questions, ask your nurse or doctor.               These medicines have changed or have updated prescriptions.        Dose/Directions    albuterol 108 (90 BASE) MCG/ACT Inhaler   Commonly known as:  PROAIR HFA/PROVENTIL HFA/VENTOLIN HFA   This may have changed:  Another medication with the same name was removed. Continue taking this medication, and follow the directions you see here.   Changed by:  Awais Mathew MD        Dose:  2 puff   Inhale 2 puffs into the lungs every 4 hours as needed for shortness of breath / dyspnea or wheezing   Refills:  0         Stop taking these medicines if you haven't already. Please contact your care team if you have questions.     fluticasone 250 MCG/BLIST Aepb Inhaler   Commonly known as:  FLOVENT DISKUS   Stopped by:  Zac Martínez MD           omeprazole 20 MG CR capsule   Commonly known as:  priLOSEC   Stopped by:  Zac Martínez MD                Where to get your medicines      These medications were sent to [facility] [address] [address]     Phone:  [phone]     atorvastatin 80 MG tablet    fluticasone 50 MCG/ACT spray    metoprolol 25 MG 24 hr tablet    mometasone 110 MCG/INH inhaler                Primary Care Provider Office Phone # Fax #    Zac Martínez [phone] [address] [address]        Equal Access to Services     [facility] AH: Hadii aad ku hadasho Soomaali, waaxda luqadaha, qaybta kaalmada adeegyada, waxay alonso correia . So [facility] [phone].    ATENCIÓN: Si habla español, tiene a deshpande disposición servicios gratuitos de asistencia lingüística. Tracie al [phone].    We comply with applicable federal civil rights  laws and [place] laws. We do not discriminate on the basis of race, color, national origin, age, disability sex, sexual orientation or gender identity.            Thank you!     Thank you for choosing [facility]  for your care. Our goal is always to provide you with excellent care. Hearing back from our patients is one way we can continue to improve our services. Please take a few minutes to complete the written survey that you may receive in the mail after your visit with us. Thank you!             Your Updated Medication List - Protect others around you: Learn how to safely use, store and throw away your medicines at www.disposemymeds.org.          This list is accurate as of: 9/14/17  9:46 AM.  Always use your most recent med list.                   Brand Name Dispense Instructions for use Diagnosis    albuterol 108 (90 BASE) MCG/ACT Inhaler    PROAIR HFA/PROVENTIL HFA/VENTOLIN HFA     Inhale 2 puffs into the lungs every 4 hours as needed for shortness of breath / dyspnea or wheezing        ASPIRIN NOT PRESCRIBED    INTENTIONAL    0 each    Antiplatelet medication not prescribed intentionally due to Allergy    ASCVD (arteriosclerotic cardiovascular disease)       atorvastatin 80 MG tablet    LIPITOR    90 tablet    Take 1 tablet (80 mg) by mouth At Bedtime    Coronary artery disease involving native coronary artery of native heart with other form of angina pectoris (H), Essential hypertension, benign       calcium + D 600-200 MG-UNIT Tabs   Generic drug:  calcium carbonate-vitamin D     100 tablet    Take 1 tablet by mouth 2 times daily.        fluticasone 50 MCG/ACT spray    FLONASE    1 Bottle    Spray 1-2 sprays into both nostrils daily    Raspy voice, Congestion of paranasal sinus       metoprolol 25 MG 24 hr tablet    TOPROL XL    90 tablet    Take 1 tablet (25 mg) by mouth At Bedtime    Coronary artery disease involving native coronary artery of native heart with other form of angina pectoris  (H)       mometasone 110 MCG/INH inhaler    ASMANEX    3 Inhaler    Inhale 1 puff into the lungs every evening    Mild persistent asthma without complication       MULTIVITAMIN TABS   OR      2 tabs daily    Elevated prostate specific antigen (PSA), Mixed hyperlipidemia       nitroGLYcerin 0.4 MG sublingual tablet    NITROSTAT    25 tablet    Place 1 tablet (0.4 mg) under the tongue every 5 minutes as needed for chest pain if you are still having symptoms after 3 doses (15 minutes) call 911.    Postsurgical percutaneous transluminal coronary angioplasty status, Coronary artery disease involving native coronary artery of native heart with angina pectoris (H), Status post coronary angioplasty       OMEGA-3 FISH OIL PO      Take 1 g by mouth daily        ranitidine 150 MG tablet    ZANTAC     Take 150 mg by mouth nightly as needed        ticagrelor 90 MG tablet    BRILINTA    180 tablet    Take 1 tablet (90 mg) by mouth 2 times daily    Coronary artery disease involving native coronary artery of native heart with other form of angina pectoris (H)

## 2017-09-25 ENCOUNTER — OFFICE VISIT (OUTPATIENT)
Dept: CARDIOLOGY | Facility: CLINIC | Age: 77
End: 2017-09-25
Attending: INTERNAL MEDICINE
Payer: COMMERCIAL

## 2017-09-25 VITALS
SYSTOLIC BLOOD PRESSURE: 131 MMHG | WEIGHT: 219.6 LBS | BODY MASS INDEX: 30.74 KG/M2 | HEART RATE: 68 BPM | HEIGHT: 71 IN | DIASTOLIC BLOOD PRESSURE: 78 MMHG

## 2017-09-25 DIAGNOSIS — R94.39 ABNORMAL CARDIOVASCULAR STRESS TEST: ICD-10-CM

## 2017-09-25 DIAGNOSIS — I25.118 CORONARY ARTERY DISEASE INVOLVING NATIVE CORONARY ARTERY OF NATIVE HEART WITH OTHER FORM OF ANGINA PECTORIS (H): ICD-10-CM

## 2017-09-25 DIAGNOSIS — I10 ESSENTIAL HYPERTENSION, BENIGN: ICD-10-CM

## 2017-09-25 DIAGNOSIS — J44.9 CHRONIC OBSTRUCTIVE PULMONARY DISEASE, UNSPECIFIED COPD TYPE (H): ICD-10-CM

## 2017-09-25 DIAGNOSIS — E78.00 PURE HYPERCHOLESTEROLEMIA: ICD-10-CM

## 2017-09-25 DIAGNOSIS — R10.13 EPIGASTRIC DISCOMFORT: ICD-10-CM

## 2017-09-25 DIAGNOSIS — I10 ESSENTIAL HYPERTENSION WITH GOAL BLOOD PRESSURE LESS THAN 140/90: ICD-10-CM

## 2017-09-25 DIAGNOSIS — J47.9 BRONCHIECTASIS WITHOUT COMPLICATION (H): ICD-10-CM

## 2017-09-25 PROCEDURE — 99214 OFFICE O/P EST MOD 30 MIN: CPT | Performed by: INTERNAL MEDICINE

## 2017-09-25 RX ORDER — ATORVASTATIN CALCIUM 80 MG/1
40 TABLET, FILM COATED ORAL AT BEDTIME
Qty: 45 TABLET | Refills: 3 | Status: SHIPPED | OUTPATIENT
Start: 2017-09-25 | End: 2017-12-19

## 2017-09-25 NOTE — MR AVS SNAPSHOT
After Visit Summary   9/25/2017    Romel Bonilla    MRN: [id number]           Patient Information     Date Of Birth          1940        Visit Information        Provider Department      9/25/2017 10:15 AM Awais Mathew MD [facility]ART [facility]        Today's Diagnoses     Essential hypertension with goal blood pressure less than 140/90        Coronary artery disease involving native coronary artery of native heart with other form of angina pectoris (H)        Epigastric discomfort        Bronchiectasis without complication (H)        Pure hypercholesterolemia        Abnormal cardiovascular stress test        HYPERTENSION        Chronic obstructive pulmonary disease, unspecified COPD type (H)           Follow-ups after your visit        Additional Services     Follow-Up with Cardiac Advanced Practice Provider           Follow-Up with Cardiologist                 Future tests that were ordered for you today     Open Future Orders        Priority Expected Expires Ordered    ALT Routine 3/24/2018 9/25/2018 9/25/2017    Echocardiogram Routine 3/24/2018 9/25/2018 9/25/2017    Follow-Up with Cardiologist Routine 3/24/2018 9/25/2018 9/25/2017    Basic metabolic panel Routine 3/24/2018 9/25/2018 9/25/2017    Lipid Profile Routine 3/24/2018 9/25/2018 9/25/2017    Lipid Profile Routine 12/24/2017 9/25/2018 9/25/2017    Basic metabolic panel Routine 12/24/2017 9/25/2018 9/25/2017    Follow-Up with Cardiac Advanced Practice Provider Routine 12/24/2017 9/25/2018 9/25/2017            Who to contact     If you have questions or need follow up information about today's clinic visit or your schedule please contact [facility] directly at [phone].  Normal or non-critical lab and imaging results will be communicated to you by MyChart, letter or phone within 4 business days after the clinic has received the results. If you do  "not hear from us within 7 days, please contact the clinic through [employer] or phone. If you have a critical or abnormal lab result, we will notify you by phone as soon as possible.  Submit refill requests through [employer] or call your pharmacy and they will forward the refill request to us. Please allow 3 business days for your refill to be completed.          Additional Information About Your Visit        [employer] Information     [employer] gives you secure access to your electronic health record. If you see a primary care provider, you can also send messages to your care team and make appointments. If you have questions, please call your primary care clinic.  If you do not have a primary care provider, please call [phone] and they will assist you.        Care EveryWhere ID     This is your Care EveryWhere ID. This could be used by other organizations to access your [place] medical records  [id number]        Your Vitals Were     Pulse Height BMI (Body Mass Index)             68 1.803 m (5' 10.98\") 30.64 kg/m2          Blood Pressure from Last 3 Encounters:   09/25/17 131/78   09/14/17 127/64   05/17/17 114/64    Weight from Last 3 Encounters:   09/25/17 99.6 kg (219 lb 9.6 oz)   09/14/17 95.7 kg (211 lb)   05/17/17 95.7 kg (211 lb)              We Performed the Following     Follow-Up with Cardiologist          Today's Medication Changes          These changes are accurate as of: 9/25/17 11:14 AM.  If you have any questions, ask your nurse or doctor.               These medicines have changed or have updated prescriptions.        Dose/Directions    atorvastatin 80 MG tablet   Commonly known as:  LIPITOR   This may have changed:  how much to take   Used for:  Coronary artery disease involving native coronary artery of native heart with other form of angina pectoris (H), Essential hypertension, benign   Changed by:  Awais Mathew MD        Dose:  40 mg   Take 0.5 tablets (40 mg) by mouth At Bedtime "   Quantity:  45 tablet   Refills:  3            Where to get your medicines      These medications were sent to [facility] - [place], [place] - [address]  [address], [facility] [address]     Phone:  [phone]     atorvastatin 80 MG tablet    ticagrelor 90 MG tablet                Primary Care Provider Office Phone # Fax #    Zac Martínez [phone] [address]  [facility] [address]        Equal Access to Services     DOMITILA HURTADO : Hadii aad ku hadasho Soomaali, waaxda luqadaha, qaybta kaalmada adeegyada, waxay idiin hayaan adeeg kharash laluana . So [facility] [phone].    ATENCIÓN: Si habla español, tiene a deshpande disposición servicios gratuitos de asistencia lingüística. [facility] [phone].    We comply with applicable federal civil rights laws and [place] laws. We do not discriminate on the basis of race, color, national origin, age, disability sex, sexual orientation or gender identity.            Thank you!     Thank you for choosing [facility] [place]  for your care. Our goal is always to provide you with excellent care. Hearing back from our patients is one way we can continue to improve our services. Please take a few minutes to complete the written survey that you may receive in the mail after your visit with us. Thank you!             Your Updated Medication List - Protect others around you: Learn how to safely use, store and throw away your medicines at www.disposemymeds.org.          This list is accurate as of: 9/25/17 11:14 AM.  Always use your most recent med list.                   Brand Name Dispense Instructions for use Diagnosis    albuterol 108 (90 BASE) MCG/ACT Inhaler    PROAIR HFA/PROVENTIL HFA/VENTOLIN HFA     Inhale 2 puffs into the lungs every 4 hours as needed for shortness of breath / dyspnea or wheezing        ASPIRIN NOT PRESCRIBED    INTENTIONAL    0 each    Antiplatelet  medication not prescribed intentionally due to Allergy    ASCVD (arteriosclerotic cardiovascular disease)       atorvastatin 80 MG tablet    LIPITOR    45 tablet    Take 0.5 tablets (40 mg) by mouth At Bedtime    Coronary artery disease involving native coronary artery of native heart with other form of angina pectoris (H), Essential hypertension, benign       fluticasone 50 MCG/ACT spray    FLONASE    1 Bottle    Spray 1-2 sprays into both nostrils daily    Raspy voice, Congestion of paranasal sinus       metoprolol 25 MG 24 hr tablet    TOPROL XL    90 tablet    Take 1 tablet (25 mg) by mouth At Bedtime    Coronary artery disease involving native coronary artery of native heart with other form of angina pectoris (H)       mometasone 110 MCG/INH inhaler    ASMANEX    3 Inhaler    Inhale 1 puff into the lungs every evening    Mild persistent asthma without complication       MULTIVITAMIN TABS   OR      2 tabs daily    Elevated prostate specific antigen (PSA), Mixed hyperlipidemia       nitroGLYcerin 0.4 MG sublingual tablet    NITROSTAT    25 tablet    Place 1 tablet (0.4 mg) under the tongue every 5 minutes as needed for chest pain if you are still having symptoms after 3 doses (15 minutes) call 911.    Postsurgical percutaneous transluminal coronary angioplasty status, Coronary artery disease involving native coronary artery of native heart with angina pectoris (H), Status post coronary angioplasty       ranitidine 150 MG tablet    ZANTAC     Take 150 mg by mouth nightly as needed        ticagrelor 90 MG tablet    BRILINTA    180 tablet    Take 1 tablet (90 mg) by mouth 2 times daily    Essential hypertension with goal blood pressure less than 140/90, Coronary artery disease involving native coronary artery of native heart with other form of angina pectoris (H)

## 2017-09-25 NOTE — LETTER
9/25/2017    Zac Martínez MD  [address] [facility] [address]  [facility] [address]    RE: Romel Bonilla       Dear Colleague,    I had the pleasure of seeing Romel Bonilla in the [facility].    The patient is a 76-year-old mildly overweight white male who presents to Cardiology Clinic for followup of the diagnosis of ischemic heart disease after undergoing cardiac catheterization, coronary angiography where he had a 99% ostial right coronary artery which required rotablation as well as aggressive PTCA and stent placement.  Since his last clinic visit, he has felt quite well with no significant symptoms of chest discomfort, shortness of breath, dizziness, palpitations, nausea, vomiting, diaphoresis or syncope.  He had previously had an abnormal Cardiolite stress test which was correlated with some symptoms of dyspnea on exertion.  Risk factors are positive for previous history of cigarette smoking, now stopped, hypercholesterolemia and hypertension.  It is negative for diabetes mellitus or family history of premature coronary disease.  He has snorted in his sleep when his weight was increased to the 230 pound range.  He has not been actively evaluated for sleep apnea.  He is not careful with his weight and his dietary discretion since his wife passed away.  There is a chronic history of bronchiectasis/COPD for which he has been treated with inhaler therapy, intermittent steroid administration as well as IV antibiotics.  He had previous cardiac intervention and had significant epigastric heaviness and substernal discomfort that did not radiate into the back, shoulder and chest which is not present at this time.  He has participated in most activities without significant restriction.      PRESENT MEDICATIONS:   1.  Atorvastatin 80 mg a day.   2.  Brilinta 90 mg twice a day.   3.  Asmanex inhaler as directed.   4.  Metoprolol XL 25 mg a day.   5.  Flonase as directed.   6.  Nitroglycerin  0.4 mg sublingual p.r.n.    7.  Ranitidine 150 mg at night.   8.  ProAir inhaler as directed.   9.  Multivitamins 2 tablets a day.      The patient has not tolerated aspirin or Plavix in the past.      LABORATORY DATA:  Demonstrates a cholesterol 163, HDL 73, LDL 74, triglycerides 78.  Sodium 141, potassium 4.1, BUN 19, creatinine 0.94, hemoglobin 13.8, platelet count 252,000.  ALT 32, AST 21.      The patient presents to Cardiology Clinic for followup of ischemic heart disease.  He appears to be tolerating his present lifestyle and does not feel particularly limited.      PHYSICAL EXAMINATION:   VITAL SIGNS:  Blood pressure 131/78 with a heart rate of 68 and regular, weight was 219 pounds, which was an increase of 8 pounds from last clinic visit.   NECK:  Without jugular venous distention, carotid bruit or palpable thyroid.   CHEST:  Essentially clear to percussion and auscultation, decreased breath sounds at the bases with prolonged expiratory phase with isolated scattered crackles.   CARDIAC:  Regular rhythm, soft S4 gallop, 1/6 systolic murmur at the left sternal border radiating to the apex.  No diastolic murmur, rub or S3.   EXTREMITIES:  Without cyanosis or edema.      CLINICAL IMPRESSION:   1.  Stable cardiac condition.   2.  Ischemic heart disease, status post rotablation and PTCA and stent placed in the ostial right coronary with moderate disease elsewhere.   3.  History of chronic bronchiectasis/chronic obstructive pulmonary disease with long cigarette smoking history.   4.  Hypertension -- well controlled.   5.  Hyperlipidemia -- improved and at goal.   6.  History of diffuse osteoarthritis.      DISCUSSION:  The patient has done well clinically with regard to cardiac status since his last clinic visit with no symptoms of angina pectoris or congestive heart failure.  Serum lipids are improved on the high-dose Lipitor with previous LDL decreased by over 20 points.  He would like to make his diet even  stricter so he can take even less Lipitor.  Consideration will be given to decreasing Lipitor to 40 but with the suggestion of adding Zetia if he is not at goal.  He does not want to restart aspirin.  Brilinta is his preference to take at this time.  He will need close followup of serum lipids, basic metabolic panel and blood pressure.  He may still be a candidate for sleep evaluation while continuing aggressive therapy of his gastroesophageal reflux and COPD/bronchiectasis.  He will have echocardiography in early 2018 to follow left ventricular function.      RECOMMENDATIONS:   1.  Continue present medications, decreasing atorvastatin to 40 mg a day.   2.  Close followup of serum lipids, basic metabolic panel and blood pressure with followup with Fina Rodríguez in 3 months.   3.  Aggressive pulmonary therapy.   4.  Aggressive GI therapy for GERD.   5.  Echocardiography in early 2018 to follow left ventricular function.   6.  Consider sleep evaluation.   7.  Diet and exercise as tolerated, avoiding cholesterol and carbs.   8.  Routine medical followup.   9.  Cardiology followup in 6 months.        Again, thank you for allowing me to participate in the care of your patient.      Sincerely,    Awais Mathew MD     [facility]

## 2017-09-26 NOTE — PROGRESS NOTES
HISTORY OF PRESENT ILLNESS:  The patient is a 76-year-old mildly overweight white male who presents to Cardiology Clinic for followup of the diagnosis of ischemic heart disease after undergoing cardiac catheterization, coronary angiography where he had a 99% ostial right coronary artery which required rotablation as well as aggressive PTCA and stent placement.  Since his last clinic visit, he has felt quite well with no significant symptoms of chest discomfort, shortness of breath, dizziness, palpitations, nausea, vomiting, diaphoresis or syncope.  He had previously had an abnormal Cardiolite stress test which was correlated with some symptoms of dyspnea on exertion.  Risk factors are positive for previous history of cigarette smoking, now stopped, hypercholesterolemia and hypertension.  It is negative for diabetes mellitus or family history of premature coronary disease.  He has snorted in his sleep when his weight was increased to the 230 pound range.  He has not been actively evaluated for sleep apnea.  He is not careful with his weight and his dietary discretion since his wife passed away.  There is a chronic history of bronchiectasis/COPD for which he has been treated with inhaler therapy, intermittent steroid administration as well as IV antibiotics.  He had previous cardiac intervention and had significant epigastric heaviness and substernal discomfort that did not radiate into the back, shoulder and chest which is not present at this time.  He has participated in most activities without significant restriction.      PRESENT MEDICATIONS:   1.  Atorvastatin 80 mg a day.   2.  Brilinta 90 mg twice a day.   3.  Asmanex inhaler as directed.   4.  Metoprolol XL 25 mg a day.   5.  Flonase as directed.   6.  Nitroglycerin 0.4 mg sublingual p.r.n.    7.  Ranitidine 150 mg at night.   8.  ProAir inhaler as directed.   9.  Multivitamins 2 tablets a day.      The patient has not tolerated aspirin or Plavix in the past.       LABORATORY DATA:  Demonstrates a cholesterol 163, HDL 73, LDL 74, triglycerides 78.  Sodium 141, potassium 4.1, BUN 19, creatinine 0.94, hemoglobin 13.8, platelet count 252,000.  ALT 32, AST 21.      The patient presents to Cardiology Clinic for followup of ischemic heart disease.  He appears to be tolerating his present lifestyle and does not feel particularly limited.      PHYSICAL EXAMINATION:   VITAL SIGNS:  Blood pressure 131/78 with a heart rate of 68 and regular, weight was 219 pounds, which was an increase of 8 pounds from last clinic visit.   NECK:  Without jugular venous distention, carotid bruit or palpable thyroid.   CHEST:  Essentially clear to percussion and auscultation, decreased breath sounds at the bases with prolonged expiratory phase with isolated scattered crackles.   CARDIAC:  Regular rhythm, soft S4 gallop, 1/6 systolic murmur at the left sternal border radiating to the apex.  No diastolic murmur, rub or S3.   EXTREMITIES:  Without cyanosis or edema.      CLINICAL IMPRESSION:   1.  Stable cardiac condition.   2.  Ischemic heart disease, status post rotablation and PTCA and stent placed in the ostial right coronary with moderate disease elsewhere.   3.  History of chronic bronchiectasis/chronic obstructive pulmonary disease with long cigarette smoking history.   4.  Hypertension -- well controlled.   5.  Hyperlipidemia -- improved and at goal.   6.  History of diffuse osteoarthritis.      DISCUSSION:  The patient has done well clinically with regard to cardiac status since his last clinic visit with no symptoms of angina pectoris or congestive heart failure.  Serum lipids are improved on the high-dose Lipitor with previous LDL decreased by over 20 points.  He would like to make his diet even stricter so he can take even less Lipitor.  Consideration will be given to decreasing Lipitor to 40 but with the suggestion of adding Zetia if he is not at goal.  He does not want to restart aspirin.   Brilinta is his preference to take at this time.  He will need close followup of serum lipids, basic metabolic panel and blood pressure.  He may still be a candidate for sleep evaluation while continuing aggressive therapy of his gastroesophageal reflux and COPD/bronchiectasis.  He will have echocardiography in early 2018 to follow left ventricular function.      RECOMMENDATIONS:   1.  Continue present medications, decreasing atorvastatin to 40 mg a day.   2.  Close followup of serum lipids, basic metabolic panel and blood pressure with followup with Fina Rodríguez in 3 months.   3.  Aggressive pulmonary therapy.   4.  Aggressive GI therapy for GERD.   5.  Echocardiography in early 2018 to follow left ventricular function.   6.  Consider sleep evaluation.   7.  Diet and exercise as tolerated, avoiding cholesterol and carbs.   8.  Routine medical followup.   9.  Cardiology followup in 6 months.      cc:   Zac Martínez MD    [address], [address]    [place]  [address]         SHAE VALVERDE MD, [facility]             D: 2017 21:20   T: 2017 01:29   MT: CHRISTOPHER      Name:     WESLEY SPARKS   MRN:      [id number]        Account:      [id number]   :      1940           Service Date: 2017      Document: [id number]

## 2017-10-17 DIAGNOSIS — I25.118 CORONARY ARTERY DISEASE INVOLVING NATIVE CORONARY ARTERY OF NATIVE HEART WITH OTHER FORM OF ANGINA PECTORIS (H): ICD-10-CM

## 2017-10-17 RX ORDER — METOPROLOL SUCCINATE 25 MG/1
25 TABLET, EXTENDED RELEASE ORAL AT BEDTIME
Qty: 90 TABLET | Refills: 3 | Status: SHIPPED | OUTPATIENT
Start: 2017-10-17 | End: 2018-07-16

## 2017-11-15 ENCOUNTER — PRE VISIT (OUTPATIENT)
Dept: CARDIOLOGY | Facility: CLINIC | Age: 77
End: 2017-11-15

## 2017-12-15 ENCOUNTER — TELEPHONE (OUTPATIENT)
Dept: FAMILY MEDICINE | Facility: CLINIC | Age: 77
End: 2017-12-15

## 2017-12-15 DIAGNOSIS — J44.9 CHRONIC OBSTRUCTIVE PULMONARY DISEASE, UNSPECIFIED COPD TYPE (H): Primary | ICD-10-CM

## 2017-12-15 RX ORDER — PREDNISONE 20 MG/1
20 TABLET ORAL 2 TIMES DAILY
Qty: 10 TABLET | Refills: 0 | Status: SHIPPED | OUTPATIENT
Start: 2017-12-15 | End: 2018-05-12

## 2017-12-15 RX ORDER — ALBUTEROL SULFATE 90 UG/1
2 AEROSOL, METERED RESPIRATORY (INHALATION) EVERY 4 HOURS PRN
Qty: 8.5 G | Refills: 0 | Status: SHIPPED | OUTPATIENT
Start: 2017-12-15 | End: 2018-05-12

## 2017-12-15 NOTE — TELEPHONE ENCOUNTER
"Spoke with patient:  Cough off/on - occasionally flares up  Has gotten prednisone in the past \"from my old doctor\" Is leaving for [place] on Wednesday and wanted Rx on had - going for two weeks   Every day coughs a little, sometimes is worse   No SOB or wheezing - uses maintenance inhaler (Amanex) but has stopped use 1-2 weeks ago (forgot for 1-2 days and felt better; wasn't coughing as much)  Also requesting a new Rx for albuterol - he lost his Rx   Is just back from [place] coughing up some phlegm - no color   No fever  Cough has been chronic 4-6 years - hx of smoking - [place] provider thought chronic cough it was related to hx of smoking   Also saw pulmonology in past   Has tried lots of things over the counter for cough - cough drop is helpful   Took 2-3 days prednisone a few weeks ago (left over from old provider) when he had a flare-up and states that \"always takes care of it\"     Patient is requesting a refill of Albuterol - Rx pended (listed as historical - would need dx associated with it)   Also is requesting an Rx for prednisone in case of a cough flare up (advised pt he would likely need OV in case of flare up as prednisone can have many side effects and is only given if needed)     Please advise   Mary Ann HAMILTON RN    "

## 2017-12-15 NOTE — TELEPHONE ENCOUNTER
Ok for both but if has fever, significant shortness of breath or cough not better soon the office visit    Zac Martínez M.D.

## 2017-12-15 NOTE — TELEPHONE ENCOUNTER
Reason for Call:  Medication or medication refill:    Do you use a [facility]?  Name of the pharmacy and phone number for the current request:      [facility] [address] [place], [place] - [address]        Name of the medication requested:   albuterol (PROAIR HFA, PROVENTIL HFA, VENTOLIN HFA) 108 (90 BASE) MCG/ACT inhaler  Prednisone - pt states old doctor would prescribe this for when he is coughing    Other request: Pt is back from [place] for a few days and forgot medications.    Can we leave a detailed message on this number? YES    Phone number patient can be reached at: Cell number on file:    Telephone Information:   Mobile [phone]       Best Time: Anytime    Call taken on 12/15/2017 at 12:49 PM by Orly Cassidy

## 2017-12-18 ENCOUNTER — OFFICE VISIT (OUTPATIENT)
Dept: CARDIOLOGY | Facility: CLINIC | Age: 77
End: 2017-12-18
Payer: COMMERCIAL

## 2017-12-18 VITALS
HEIGHT: 71 IN | HEART RATE: 64 BPM | WEIGHT: 223 LBS | BODY MASS INDEX: 31.22 KG/M2 | DIASTOLIC BLOOD PRESSURE: 58 MMHG | SYSTOLIC BLOOD PRESSURE: 132 MMHG

## 2017-12-18 DIAGNOSIS — J47.9 BRONCHIECTASIS WITHOUT COMPLICATION (H): ICD-10-CM

## 2017-12-18 DIAGNOSIS — J44.9 CHRONIC OBSTRUCTIVE PULMONARY DISEASE, UNSPECIFIED COPD TYPE (H): ICD-10-CM

## 2017-12-18 DIAGNOSIS — R94.39 ABNORMAL CARDIOVASCULAR STRESS TEST: ICD-10-CM

## 2017-12-18 DIAGNOSIS — R10.13 EPIGASTRIC DISCOMFORT: ICD-10-CM

## 2017-12-18 DIAGNOSIS — E78.00 PURE HYPERCHOLESTEROLEMIA: ICD-10-CM

## 2017-12-18 DIAGNOSIS — I25.118 CORONARY ARTERY DISEASE INVOLVING NATIVE CORONARY ARTERY OF NATIVE HEART WITH OTHER FORM OF ANGINA PECTORIS (H): ICD-10-CM

## 2017-12-18 DIAGNOSIS — I10 ESSENTIAL HYPERTENSION WITH GOAL BLOOD PRESSURE LESS THAN 140/90: ICD-10-CM

## 2017-12-18 DIAGNOSIS — I10 ESSENTIAL HYPERTENSION, BENIGN: ICD-10-CM

## 2017-12-18 LAB
ANION GAP SERPL CALCULATED.3IONS-SCNC: 14.1 MMOL/L (ref 6–17)
BUN SERPL-MCNC: 17 MG/DL (ref 7–30)
CALCIUM SERPL-MCNC: 9.4 MG/DL (ref 8.5–10.5)
CHLORIDE SERPL-SCNC: 106 MMOL/L (ref 98–107)
CHOLEST SERPL-MCNC: 184 MG/DL
CO2 SERPL-SCNC: 25 MMOL/L (ref 23–29)
CREAT SERPL-MCNC: 1.3 MG/DL (ref 0.7–1.3)
GFR SERPL CREATININE-BSD FRML MDRD: 53 ML/MIN/1.7M2
GLUCOSE SERPL-MCNC: 111 MG/DL (ref 70–105)
HDLC SERPL-MCNC: 65 MG/DL
LDLC SERPL CALC-MCNC: 82 MG/DL
NONHDLC SERPL-MCNC: 119 MG/DL
POTASSIUM SERPL-SCNC: 4.1 MMOL/L (ref 3.5–5.1)
SODIUM SERPL-SCNC: 141 MMOL/L (ref 136–145)
TRIGL SERPL-MCNC: 185 MG/DL

## 2017-12-18 PROCEDURE — 80048 BASIC METABOLIC PNL TOTAL CA: CPT | Performed by: INTERNAL MEDICINE

## 2017-12-18 PROCEDURE — 36415 COLL VENOUS BLD VENIPUNCTURE: CPT | Performed by: INTERNAL MEDICINE

## 2017-12-18 PROCEDURE — 80061 LIPID PANEL: CPT | Performed by: INTERNAL MEDICINE

## 2017-12-18 PROCEDURE — 99214 OFFICE O/P EST MOD 30 MIN: CPT | Performed by: INTERNAL MEDICINE

## 2017-12-18 NOTE — LETTER
12/18/2017      Zac Martínez MD  [address] [facility] [address]  [facility] [address]      RE: Romel Bonilla       Dear Colleague,    I had the pleasure of seeing Romel Bonilla in the [facility].    HISTORY OF PRESENT ILLNESS:  The patient is a 76-year-old, mildly overweight white male who presents to Cardiology Clinic for followup of a diagnosis of ischemic heart disease after undergoing cardiac catheterization and coronary angiography, where he had 99% ostial right coronary artery, which required rotablation as well as aggressive PTCA and stent placement.  Since the last clinic visit, he has felt well with no significant symptoms of chest discomfort, shortness of breath, dizziness, palpitations, nausea, vomiting, diaphoresis or syncope.  He occasionally has some twinges, but they are momentary and transient and do not limit his activity.  He had previously had an abnormal Cardiolite stress test, which correlated with some symptoms of dyspnea on exertion.  Risk factors were positive for a previous history of cigarette smoking, now stopped, hypercholesterolemia and hypertension.  He was negative for diabetes mellitus or a family history of premature coronary artery disease.  He has been evaluated for sleep apnea.  He has attempted to maintain weight loss, and unfortunately he has been less discretionary about his diet as of late, especially since his wife passed away.  He also has a chronic history of bronchiectasis/COPD, for which he has been treated with inhaler therapy, intermittent pulse steroid administration as well as IV antibiotics.  When he has had previous cardiac intervention, he has had at times epigastric heaviness and substernal discomfort that did not show radiation and not necessarily associated with symptoms.  He has been able to participate in most activities without significant restriction.      PRESENT MEDICATIONS:   1.  Albuterol inhaler as needed.   2.  Prednisone  20 mg twice a day when needed.   3.  Metoprolol XL 25 mg twice a day.   4.  Atorvastatin 40 mg a day.   5.  Brilinta 90 mg twice a day.   6.  Flonase as directed.   7.  Nitroglycerin 0.4 mg sublingual p.r.n., which has not been used.   8.  Ranitidine 150 mg at night.     9.  Multivitamins 2 tablets daily.        He has not tolerated aspirin or Plavix in the past.      LABORATORY DATA:  Cholesterol 184, HDL 65, LDL 82, triglyceride 185, showing some of his dietary indiscretion.  Sodium 141, potassium 4.1, BUN 17, creatinine 1.3, hemoglobin 13.8, platelet count 152,000, ALT 32, AST 21.      The patient presents to Cardiology Clinic for followup of ischemic heart disease.  He denies symptoms of PND, orthopnea, fever, chills or sweats.  He again does not feel particularly limited in his exercise tolerance or activity.      PHYSICAL EXAMINATION:   VITAL SIGNS:  Blood pressure is 132/58 with a heart rate of 60-70 and regular.  Weight is 223 pounds, which is up 4 pounds from previous clinic visit.   NECK:  Without jugular venous distention, carotid bruit or palpable thyroid.   CHEST:  Essentially clear to percussion and auscultation with decreased breath sounds in the bases with prolonged inspiratory phase and isolated scattered crackles.   CARDIAC:  Regular rhythm, soft S4 gallop, a 1/6 systolic murmur at the left sternal border radiating to the apex.  No diastolic murmur, rub or S3.   EXTREMITIES:  Without cyanosis or edema.      CLINICAL IMPRESSION:   1.  Stable cardiac condition.   2.  History of ischemic heart disease, status post rotablation, PTCA and stent placed in the ostial right coronary artery with moderate disease elsewhere.   3.  History of chronic bronchiectasis/chronic obstructive pulmonary disease with long cigarette smoking history.    4.  Hypertension, adequate control.   5.  Hyperlipidemia, not quite at goal with elevated triglycerides.   6.  History of diffuse osteoarthritis.      DISCUSSION:  The  patient has done reasonably well from a cardiac viewpoint.  He has not had significant symptoms of angina pectoris or congestive heart failure.  His serum lipids are not at goal, and his weight has increased mostly related to dietary indiscretion, but he is feeling better since he has participated in sexual activity without his wife.  He will continue his medications unchanged at this time and be a candidate for echocardiography in early 2018 to follow left ventricular function and to further discuss the continuation of his Brilinta.      RECOMMENDATIONS:   1.  Continue present medications.   2.  Close followup of serum lipids, basic metabolic panel and blood pressure.   3.  Aggressive pulmonary therapy.   4.  Aggressive GI therapy for GERD.   5.  Echocardiography in 3-4 months to follow left ventricular function.   6.  Consider sleep evaluation.   7.  Diet and exercise as tolerated, avoiding cholesterol and carbs.   8.  Routine medical followup.   9.  Cardiology followup up in 4 months.        Outpatient Encounter Prescriptions as of 12/18/2017   Medication Sig Dispense Refill     albuterol (PROAIR HFA/PROVENTIL HFA/VENTOLIN HFA) 108 (90 BASE) MCG/ACT Inhaler Inhale 2 puffs into the lungs every 4 hours as needed for shortness of breath / dyspnea or wheezing 8.5 g 0     predniSONE (DELTASONE) 20 MG tablet Take 1 tablet (20 mg) by mouth 2 times daily 10 tablet 0     metoprolol (TOPROL XL) 25 MG 24 hr tablet Take 1 tablet (25 mg) by mouth At Bedtime 90 tablet 3     ticagrelor (BRILINTA) 90 MG tablet Take 1 tablet (90 mg) by mouth 2 times daily 180 tablet 3     [DISCONTINUED] atorvastatin (LIPITOR) 80 MG tablet Take 0.5 tablets (40 mg) by mouth At Bedtime 45 tablet 3     fluticasone (FLONASE) 50 MCG/ACT spray Spray 1-2 sprays into both nostrils daily 1 Bottle 3     nitroglycerin (NITROSTAT) 0.4 MG SL tablet Place 1 tablet (0.4 mg) under the tongue every 5 minutes as needed for chest pain if you are still having  symptoms after 3 doses (15 minutes) call 911. 25 tablet 1     ranitidine (ZANTAC) 150 MG tablet Take 150 mg by mouth nightly as needed        MULTIVITAMIN TABS   OR 2 tabs daily       [DISCONTINUED] mometasone (ASMANEX) 110 MCG/INH inhaler Inhale 1 puff into the lungs every evening 3 Inhaler 3     ASPIRIN NOT PRESCRIBED (INTENTIONAL) Antiplatelet medication not prescribed intentionally due to Allergy (Patient not taking: Reported on 9/14/2017) 0 each 0     No facility-administered encounter medications on file as of 12/18/2017.        Again, thank you for allowing me to participate in the care of your patient.      Sincerely,    Awais Mathew MD     [facility]

## 2017-12-18 NOTE — MR AVS SNAPSHOT
After Visit Summary   12/18/2017    Romel Bonilla    MRN: [id number]           Patient Information     Date Of Birth          1940        Visit Information        Provider Department      12/18/2017 2:45 PM Awais Mathew MD [facility]        Today's Diagnoses     Essential hypertension with goal blood pressure less than 140/90        Coronary artery disease involving native coronary artery of native heart with other form of angina pectoris (H)        Epigastric discomfort        Bronchiectasis without complication (H)        Pure hypercholesterolemia        Abnormal cardiovascular stress test        HYPERTENSION        Chronic obstructive pulmonary disease, unspecified COPD type (H)           Follow-ups after your visit        Additional Services     Follow-Up with Cardiologist                 Future tests that were ordered for you today     Open Future Orders        Priority Expected Expires Ordered    Basic metabolic panel Routine 4/17/2018 12/18/2018 12/18/2017    Lipid Profile Routine 4/17/2018 12/18/2018 12/18/2017    ALT Routine 4/17/2018 12/18/2018 12/18/2017    Echocardiogram Routine 4/17/2018 12/18/2018 12/18/2017    Follow-Up with Cardiologist Routine 4/17/2018 12/18/2018 12/18/2017            Who to contact     If you have questions or need follow up information about today's clinic visit or your schedule please contact [facility] directly at [phone].  Normal or non-critical lab and imaging results will be communicated to you by MyChart, letter or phone within 4 business days after the clinic has received the results. If you do not hear from us within 7 days, please contact the clinic through MyChart or phone. If you have a critical or abnormal lab result, we will notify you by phone as soon as possible.  Submit refill requests through [employer] or call your pharmacy and they will forward  "the refill request to us. Please allow 3 business days for your refill to be completed.          Additional Information About Your Visit        [employer] Information     [employer] gives you secure access to your electronic health record. If you see a primary care provider, you can also send messages to your care team and make appointments. If you have questions, please call your primary care clinic.  If you do not have a primary care provider, please call [phone] and they will assist you.        Care EveryWhere ID     This is your Care EveryWhere ID. This could be used by other organizations to access your [place] medical records  [id number]        Your Vitals Were     Pulse Height BMI (Body Mass Index)             64 1.803 m (5' 10.98\") 31.12 kg/m2          Blood Pressure from Last 3 Encounters:   12/18/17 132/58   09/25/17 131/78   09/14/17 127/64    Weight from Last 3 Encounters:   12/18/17 101.2 kg (223 lb)   09/25/17 99.6 kg (219 lb 9.6 oz)   09/14/17 95.7 kg (211 lb)              We Performed the Following     Follow-Up with Cardiologist        Primary Care Provider Office Phone # Fax #    Zac Martínez [phone] [address] [address]        Equal Access to Services     [facility]: Hadii aad ku hadasho Soomaali, waaxda luqadaha, qaybta kaalmada adeegyada, waxay idiin hayaan nellie correia . So [facility] [phone].    ATENCIÓN: Si habla español, tiene a deshpande disposición servicios gratuitos de asistencia lingüística. Llame al [phone].    We comply with applicable federal civil rights laws and [place] laws. We do not discriminate on the basis of race, color, national origin, age, disability, sex, sexual orientation, or gender identity.            Thank you!     Thank you for choosing [facility]  for your care. Our goal is always to provide you with excellent care. Hearing back from our patients is one way " we can continue to improve our services. Please take a few minutes to complete the written survey that you may receive in the mail after your visit with us. Thank you!             Your Updated Medication List - Protect others around you: Learn how to safely use, store and throw away your medicines at www.disposemymeds.org.          This list is accurate as of: 12/18/17  3:35 PM.  Always use your most recent med list.                   Brand Name Dispense Instructions for use Diagnosis    albuterol 108 (90 BASE) MCG/ACT Inhaler    PROAIR HFA/PROVENTIL HFA/VENTOLIN HFA    8.5 g    Inhale 2 puffs into the lungs every 4 hours as needed for shortness of breath / dyspnea or wheezing    Chronic obstructive pulmonary disease, unspecified COPD type (H)       ASPIRIN NOT PRESCRIBED    INTENTIONAL    0 each    Antiplatelet medication not prescribed intentionally due to Allergy    ASCVD (arteriosclerotic cardiovascular disease)       atorvastatin 80 MG tablet    LIPITOR    45 tablet    Take 0.5 tablets (40 mg) by mouth At Bedtime    Coronary artery disease involving native coronary artery of native heart with other form of angina pectoris (H), Essential hypertension, benign       fluticasone 50 MCG/ACT spray    FLONASE    1 Bottle    Spray 1-2 sprays into both nostrils daily    Raspy voice, Congestion of paranasal sinus       metoprolol 25 MG 24 hr tablet    TOPROL XL    90 tablet    Take 1 tablet (25 mg) by mouth At Bedtime    Coronary artery disease involving native coronary artery of native heart with other form of angina pectoris (H)       MULTIVITAMIN TABS   OR      2 tabs daily    Elevated prostate specific antigen (PSA), Mixed hyperlipidemia       nitroGLYcerin 0.4 MG sublingual tablet    NITROSTAT    25 tablet    Place 1 tablet (0.4 mg) under the tongue every 5 minutes as needed for chest pain if you are still having symptoms after 3 doses (15 minutes) call 911.    Postsurgical percutaneous transluminal coronary  angioplasty status, Coronary artery disease involving native coronary artery of native heart with angina pectoris (H), Status post coronary angioplasty       predniSONE 20 MG tablet    DELTASONE    10 tablet    Take 1 tablet (20 mg) by mouth 2 times daily    Chronic obstructive pulmonary disease, unspecified COPD type (H)       ranitidine 150 MG tablet    ZANTAC     Take 150 mg by mouth nightly as needed        ticagrelor 90 MG tablet    BRILINTA    180 tablet    Take 1 tablet (90 mg) by mouth 2 times daily    Essential hypertension with goal blood pressure less than 140/90, Coronary artery disease involving native coronary artery of native heart with other form of angina pectoris (H)

## 2017-12-19 DIAGNOSIS — I25.118 CORONARY ARTERY DISEASE INVOLVING NATIVE CORONARY ARTERY OF NATIVE HEART WITH OTHER FORM OF ANGINA PECTORIS (H): ICD-10-CM

## 2017-12-19 DIAGNOSIS — I10 ESSENTIAL HYPERTENSION, BENIGN: ICD-10-CM

## 2017-12-19 RX ORDER — ATORVASTATIN CALCIUM 80 MG/1
40 TABLET, FILM COATED ORAL AT BEDTIME
Qty: 45 TABLET | Refills: 1 | Status: SHIPPED | OUTPATIENT
Start: 2017-12-19 | End: 2018-01-09

## 2017-12-19 NOTE — PROGRESS NOTES
HISTORY OF PRESENT ILLNESS:  The patient is a 76-year-old, mildly overweight white male who presents to Cardiology Clinic for followup of a diagnosis of ischemic heart disease after undergoing cardiac catheterization and coronary angiography, where he had 99% ostial right coronary artery, which required rotablation as well as aggressive PTCA and stent placement.  Since the last clinic visit, he has felt well with no significant symptoms of chest discomfort, shortness of breath, dizziness, palpitations, nausea, vomiting, diaphoresis or syncope.  He occasionally has some twinges, but they are momentary and transient and do not limit his activity.  He had previously had an abnormal Cardiolite stress test, which correlated with some symptoms of dyspnea on exertion.  Risk factors were positive for a previous history of cigarette smoking, now stopped, hypercholesterolemia and hypertension.  He was negative for diabetes mellitus or a family history of premature coronary artery disease.  He has been evaluated for sleep apnea.  He has attempted to maintain weight loss, and unfortunately he has been less discretionary about his diet as of late, especially since his wife passed away.  He also has a chronic history of bronchiectasis/COPD, for which he has been treated with inhaler therapy, intermittent pulse steroid administration as well as IV antibiotics.  When he has had previous cardiac intervention, he has had at times epigastric heaviness and substernal discomfort that did not show radiation and not necessarily associated with symptoms.  He has been able to participate in most activities without significant restriction.      PRESENT MEDICATIONS:   1.  Albuterol inhaler as needed.   2.  Prednisone 20 mg twice a day when needed.   3.  Metoprolol XL 25 mg twice a day.   4.  Atorvastatin 40 mg a day.   5.  Brilinta 90 mg twice a day.   6.  Flonase as directed.   7.  Nitroglycerin 0.4 mg sublingual p.r.n., which has not been  used.   8.  Ranitidine 150 mg at night.     9.  Multivitamins 2 tablets daily.        He has not tolerated aspirin or Plavix in the past.      LABORATORY DATA:  Cholesterol 184, HDL 65, LDL 82, triglyceride 185, showing some of his dietary indiscretion.  Sodium 141, potassium 4.1, BUN 17, creatinine 1.3, hemoglobin 13.8, platelet count 152,000, ALT 32, AST 21.      The patient presents to Cardiology Clinic for followup of ischemic heart disease.  He denies symptoms of PND, orthopnea, fever, chills or sweats.  He again does not feel particularly limited in his exercise tolerance or activity.      PHYSICAL EXAMINATION:   VITAL SIGNS:  Blood pressure is 132/58 with a heart rate of 60-70 and regular.  Weight is 223 pounds, which is up 4 pounds from previous clinic visit.   NECK:  Without jugular venous distention, carotid bruit or palpable thyroid.   CHEST:  Essentially clear to percussion and auscultation with decreased breath sounds in the bases with prolonged inspiratory phase and isolated scattered crackles.   CARDIAC:  Regular rhythm, soft S4 gallop, a 1/6 systolic murmur at the left sternal border radiating to the apex.  No diastolic murmur, rub or S3.   EXTREMITIES:  Without cyanosis or edema.      CLINICAL IMPRESSION:   1.  Stable cardiac condition.   2.  History of ischemic heart disease, status post rotablation, PTCA and stent placed in the ostial right coronary artery with moderate disease elsewhere.   3.  History of chronic bronchiectasis/chronic obstructive pulmonary disease with long cigarette smoking history.    4.  Hypertension, adequate control.   5.  Hyperlipidemia, not quite at goal with elevated triglycerides.   6.  History of diffuse osteoarthritis.      DISCUSSION:  The patient has done reasonably well from a cardiac viewpoint.  He has not had significant symptoms of angina pectoris or congestive heart failure.  His serum lipids are not at goal, and his weight has increased mostly related to  dietary indiscretion, but he is feeling better since he has participated in sexual activity without his wife.  He will continue his medications unchanged at this time and be a candidate for echocardiography in early 2018 to follow left ventricular function and to further discuss the continuation of his Brilinta.      RECOMMENDATIONS:   1.  Continue present medications.   2.  Close followup of serum lipids, basic metabolic panel and blood pressure.   3.  Aggressive pulmonary therapy.   4.  Aggressive GI therapy for GERD.   5.  Echocardiography in 3-4 months to follow left ventricular function.   6.  Consider sleep evaluation.   7.  Diet and exercise as tolerated, avoiding cholesterol and carbs.   8.  Routine medical followup.   9.  Cardiology followup up in 4 months.      cc:   [place] [address]         SH[facility] VALVERDE [facility]             D: 2017 22:58   T: 2017 10:40   MT: rosangela      Name:     WESLEY SPARKS   MRN:      [id number]        Account:      [id number]   :      1940           Service Date: 2017      Document: [id number]

## 2018-01-09 DIAGNOSIS — I25.118 CORONARY ARTERY DISEASE INVOLVING NATIVE CORONARY ARTERY OF NATIVE HEART WITH OTHER FORM OF ANGINA PECTORIS (H): ICD-10-CM

## 2018-01-09 DIAGNOSIS — I10 ESSENTIAL HYPERTENSION, BENIGN: ICD-10-CM

## 2018-01-09 RX ORDER — ATORVASTATIN CALCIUM 80 MG/1
40 TABLET, FILM COATED ORAL AT BEDTIME
Qty: 45 TABLET | Refills: 1 | Status: SHIPPED | OUTPATIENT
Start: 2018-01-09 | End: 2018-05-21

## 2018-01-15 ENCOUNTER — TELEPHONE (OUTPATIENT)
Dept: FAMILY MEDICINE | Facility: CLINIC | Age: 78
End: 2018-01-15

## 2018-01-15 DIAGNOSIS — J45.30 MILD PERSISTENT ASTHMA WITHOUT COMPLICATION: ICD-10-CM

## 2018-01-15 NOTE — TELEPHONE ENCOUNTER
Pt requesting rx Asmanex to be sent to [facility], fax # [phone].  Pt may be reached at [phone].  Ship to [address] [place] [address], [address]  [facility], [address]  He wants a 90 day supply.

## 2018-01-15 NOTE — TELEPHONE ENCOUNTER
Asmanex      Last Written Prescription Date:  ?  Last Fill Quantity: ?,   # refills: ?  Last Office Visit: 9/14/2017  Future Office visit:       Routing refill request to provider for review/approval because:  Drug not active on patient's medication list

## 2018-01-25 ENCOUNTER — TELEPHONE (OUTPATIENT)
Dept: FAMILY MEDICINE | Facility: CLINIC | Age: 78
End: 2018-01-25

## 2018-01-25 NOTE — TELEPHONE ENCOUNTER
PT is requesting Rx Asmanex because his insurance does not cover the medication originally prescribed.  His pharmacy recommended he has it switched to Asmanex.

## 2018-01-25 NOTE — TELEPHONE ENCOUNTER
First I am seeing this come through to me on this inhaler issue. Just doing a little research I found that Asmanex is not the preferred inhaler. Flovent is actually the preferred inhaler.    I spoke with patient and he is agreeable to this inhaler. Please send to [facility].    Thanks,    Baltazar Solis, SUJIT

## 2018-02-23 ENCOUNTER — TELEPHONE (OUTPATIENT)
Dept: CARDIOLOGY | Facility: CLINIC | Age: 78
End: 2018-02-23

## 2018-02-23 DIAGNOSIS — I10 ESSENTIAL HYPERTENSION WITH GOAL BLOOD PRESSURE LESS THAN 140/90: ICD-10-CM

## 2018-02-23 DIAGNOSIS — I25.118 CORONARY ARTERY DISEASE INVOLVING NATIVE CORONARY ARTERY OF NATIVE HEART WITH OTHER FORM OF ANGINA PECTORIS (H): ICD-10-CM

## 2018-02-23 NOTE — TELEPHONE ENCOUNTER
Patient left a message at 1:33 requesting a call.  2:12 attempted to contact patient, no answer, left a message to call back to Team 2 RN phone    Patient has changed insurance and needs to order his meds through [employer] mail order. Refill for brillinta escripted.

## 2018-05-01 ENCOUNTER — PRE VISIT (OUTPATIENT)
Dept: CARDIOLOGY | Facility: CLINIC | Age: 78
End: 2018-05-01

## 2018-05-16 ENCOUNTER — TELEPHONE (OUTPATIENT)
Dept: CARDIOLOGY | Facility: CLINIC | Age: 78
End: 2018-05-16

## 2018-05-16 NOTE — TELEPHONE ENCOUNTER
VM from patient stating that he received a call stating that he has two appointments with Dr. Mathew and is confused. Patient states he knows he has labs first and then is going to see Dr. Mathew. Spoke with patient. Reviewed with patient that he has a lab appointment at 9:40am, an echo at 10:00am and then a visit with Dr. Mathew at 1:45pm, all on the same day, which is Monday, May 21. Patient verbalized understanding and agreed with plan of care.

## 2018-05-21 ENCOUNTER — HOSPITAL ENCOUNTER (OUTPATIENT)
Dept: CARDIOLOGY | Facility: CLINIC | Age: 78
Discharge: HOME | End: 2018-05-21
Attending: INTERNAL MEDICINE | Admitting: INTERNAL MEDICINE
Payer: COMMERCIAL

## 2018-05-21 ENCOUNTER — OFFICE VISIT (OUTPATIENT)
Dept: CARDIOLOGY | Facility: CLINIC | Age: 78
End: 2018-05-21
Attending: INTERNAL MEDICINE
Payer: COMMERCIAL

## 2018-05-21 VITALS
WEIGHT: 221 LBS | BODY MASS INDEX: 30.94 KG/M2 | HEIGHT: 71 IN | DIASTOLIC BLOOD PRESSURE: 60 MMHG | HEART RATE: 70 BPM | SYSTOLIC BLOOD PRESSURE: 114 MMHG

## 2018-05-21 DIAGNOSIS — I10 ESSENTIAL HYPERTENSION, BENIGN: ICD-10-CM

## 2018-05-21 DIAGNOSIS — R10.13 EPIGASTRIC DISCOMFORT: ICD-10-CM

## 2018-05-21 DIAGNOSIS — E78.00 PURE HYPERCHOLESTEROLEMIA: ICD-10-CM

## 2018-05-21 DIAGNOSIS — I25.118 CORONARY ARTERY DISEASE INVOLVING NATIVE CORONARY ARTERY OF NATIVE HEART WITH OTHER FORM OF ANGINA PECTORIS (H): ICD-10-CM

## 2018-05-21 DIAGNOSIS — J44.9 CHRONIC OBSTRUCTIVE PULMONARY DISEASE, UNSPECIFIED COPD TYPE (H): ICD-10-CM

## 2018-05-21 DIAGNOSIS — I10 ESSENTIAL HYPERTENSION WITH GOAL BLOOD PRESSURE LESS THAN 140/90: ICD-10-CM

## 2018-05-21 DIAGNOSIS — J47.9 BRONCHIECTASIS WITHOUT COMPLICATION (H): ICD-10-CM

## 2018-05-21 DIAGNOSIS — R94.39 ABNORMAL CARDIOVASCULAR STRESS TEST: ICD-10-CM

## 2018-05-21 LAB
ALT SERPL W P-5'-P-CCNC: 23 U/L (ref 0–70)
ANION GAP SERPL CALCULATED.3IONS-SCNC: 6 MMOL/L (ref 3–14)
BUN SERPL-MCNC: 16 MG/DL (ref 7–30)
CALCIUM SERPL-MCNC: 8.7 MG/DL (ref 8.5–10.1)
CHLORIDE SERPL-SCNC: 108 MMOL/L (ref 94–109)
CHOLEST SERPL-MCNC: 171 MG/DL
CO2 SERPL-SCNC: 25 MMOL/L (ref 20–32)
CREAT SERPL-MCNC: 0.8 MG/DL (ref 0.66–1.25)
GFR SERPL CREATININE-BSD FRML MDRD: >90 ML/MIN/1.7M2
GLUCOSE SERPL-MCNC: 101 MG/DL (ref 70–99)
HDLC SERPL-MCNC: 68 MG/DL
LDLC SERPL CALC-MCNC: 76 MG/DL
NONHDLC SERPL-MCNC: 103 MG/DL
POTASSIUM SERPL-SCNC: 4.1 MMOL/L (ref 3.4–5.3)
SODIUM SERPL-SCNC: 139 MMOL/L (ref 133–144)
TRIGL SERPL-MCNC: 136 MG/DL

## 2018-05-21 PROCEDURE — 84460 ALANINE AMINO (ALT) (SGPT): CPT | Performed by: INTERNAL MEDICINE

## 2018-05-21 PROCEDURE — 93306 TTE W/DOPPLER COMPLETE: CPT | Mod: 26 | Performed by: INTERNAL MEDICINE

## 2018-05-21 PROCEDURE — 36415 COLL VENOUS BLD VENIPUNCTURE: CPT | Performed by: INTERNAL MEDICINE

## 2018-05-21 PROCEDURE — 25500064 ZZH RX 255 OP 636: Performed by: INTERNAL MEDICINE

## 2018-05-21 PROCEDURE — 93306 TTE W/DOPPLER COMPLETE: CPT

## 2018-05-21 PROCEDURE — 80061 LIPID PANEL: CPT | Performed by: INTERNAL MEDICINE

## 2018-05-21 PROCEDURE — 80048 BASIC METABOLIC PNL TOTAL CA: CPT | Performed by: INTERNAL MEDICINE

## 2018-05-21 PROCEDURE — 99214 OFFICE O/P EST MOD 30 MIN: CPT | Mod: 25 | Performed by: INTERNAL MEDICINE

## 2018-05-21 RX ORDER — ATORVASTATIN CALCIUM 40 MG/1
40 TABLET, FILM COATED ORAL AT BEDTIME
Qty: 90 TABLET | Refills: 3 | Status: SHIPPED | OUTPATIENT
Start: 2018-05-21 | End: 2019-04-30

## 2018-05-21 RX ADMIN — HUMAN ALBUMIN MICROSPHERES AND PERFLUTREN 9 ML: 10; .22 INJECTION, SOLUTION INTRAVENOUS at 11:00

## 2018-05-21 NOTE — LETTER
5/21/2018      Zac Martínez MD  [address]  [facility] [address]      RE: Romel Bonilla       Dear Colleague,    I had the pleasure of seeing Romel Bonilla in the [facility].    Service Date: 05/21/2018      HISTORY OF PRESENT ILLNESS:  The patient is a 77-year-old mildly overweight white male who presents to Cardiology Clinic for followup of diagnosis of ischemic heart disease after undergoing cardiac catheterization and coronary angiography with 99% ostial right coronary narrowing which required rotablation as well as aggressive PTCA and stent placement.  Since last visit, he has done well.  He has had no significant symptoms of chest discomfort, shortness of breath, dizziness, palpitations, nausea, vomiting, diaphoresis or syncope.  He is able to participate in most activities without significant restriction.  He previously had an abnormal Cardiolite stress test which correlated with symptoms of dyspnea on exertion.  Risk factors are positive for previous history of cigarette smoking, now stopped, hypercholesterolemia and hypertension.  He was negative for diabetes mellitus or family history of premature coronary disease.  He has been evaluated for sleep apnea.  He has attempted to maintain weight loss, but has been a bit less discretion as of late and gained a few pounds.  He has a chronic history of bronchiectasis/COPD for which he has been treated with inhaler therapy with intermittent pulse steroid therapy as well as IV antibiotics.      MEDICATIONS:   1.  Atorvastatin 40 mg a day.   2.  Flonase as directed.   3.  Flovent as directed.   4.  Metoprolol XL 25 mg at bedtime.   5.  Multivitamins 2 tabs a day.   6.  Nitroglycerin 0.4 mg sublingual p.r.n.   7.  Prednisone 20 mg a day.   8.  ProAir inhaler as directed.   9.  Zantac 150 mg a day, primarily at night.   10.  Ticagrelor 90 mg twice a day.      LABORATORY DATA:  Demonstrates cholesterol 131, HDL 68, LDL  76, triglyceride 136.  Sodium 139, potassium 4.1, BUN 16, creatinine 0.8.  ALT 23.  Echocardiogram demonstrated a normal-sized left ventricle with intact systolic pressure, ejection fraction 55%-60% with mild aortic insufficiency.  No other severe regional wall motion abnormality.  No other significant valvular insufficiency or stenosis and no change from 2016.        The patient is tolerating his medications reasonably well and does not feel restricted in his activity.      PHYSICAL EXAMINATION:   VITAL SIGNS:  Blood pressure 114/60 with a heart rate of 70-80 and regular.  Weight was 221 pounds, which is stable.   NECK:  Without jugular venous distention, carotid bruit or palpable thyroid.   CHEST:  Essentially clear to percussion and auscultation with decreased breath sounds at the bases, prolonged expiratory phase and isolated scattered crackles.   CARDIAC:  Regular rhythm, soft S4 gallop, 1/6 systolic murmur at the left sternal border radiating to the apex.  No diastolic murmur, rub or S3.   EXTREMITIES:  Without cyanosis or edema.      CLINICAL IMPRESSION:   1.  Stable cardiac condition.   2.  History of ischemic heart disease, status post rotablation, PTCA and stent placed in the ostial right coronary artery with moderate disease elsewhere.   3.  History of chronic bronchiectasis, chronic obstructive pulmonary disease with long cigarette smoking history.   4.  Hypertension with adequate control.   5.  Hyperlipidemia, not quite at goal.   6.  History of diffuse osteoarthritis.      DISCUSSION:  The patient has done well from a cardiac viewpoint.  He has had no significant symptoms of angina pectoris or congestive heart failure.  Serum lipids are not quite at goal.  He has felt better since his last clinic visit and feels that he is stable.  He is tolerating his medications unchanged.  Echocardiogram shows intact left ventricular function.  Brilinta will be continued because he has itching response to  clopidogrel and has an allergy to aspirin.      RECOMMENDATIONS:   1.  Continue present medications.   2.  Close followup of serum lipids, basic metabolic panel and blood pressure with followup with [facility] in 3 months.   3.  Aggressive pulmonary therapy.   4.  Aggressive GI therapy for GERD.   5.  Consider sleep evaluation.   6.  Diet and exercise as tolerated, avoiding cholesterol and carbohydrates.   7.  Routine medical followup.   8.  Cardiology followup in 1 year.        cc:   Zac Martínez MD    [address]    [place]  [address]         SHAE VALVERDE MD, [facility]             D: 2018   T: 2018   MT: CHRISTOPHER      Name:     WESLEY SPARKS   MRN:      [id number]        Account:      [id number]   :      1940           Service Date: 2018      Document: [id number]       Outpatient Encounter Prescriptions as of 2018   Medication Sig Dispense Refill     ASPIRIN NOT PRESCRIBED (INTENTIONAL) Antiplatelet medication not prescribed intentionally due to Allergy 0 each 0     atorvastatin (LIPITOR) 40 MG tablet Take 1 tablet (40 mg) by mouth At Bedtime 90 tablet 3     fluticasone (FLONASE) 50 MCG/ACT spray Spray 1-2 sprays into both nostrils daily 1 Bottle 3     fluticasone (FLOVENT DISKUS) 100 MCG/BLIST AEPB Inhale 1 puff into the lungs 2 times daily 3 Inhaler 3     metoprolol (TOPROL XL) 25 MG 24 hr tablet Take 1 tablet (25 mg) by mouth At Bedtime 90 tablet 3     mometasone (ASMANEX) 110 MCG/INH inhaler Inhale 1 puff into the lungs every evening 3 Inhaler 3     MULTIVITAMIN TABS   OR 2 tabs daily       nitroglycerin (NITROSTAT) 0.4 MG SL tablet Place 1 tablet (0.4 mg) under the tongue every 5 minutes as needed for chest pain if you are still having symptoms after 3 doses (15 minutes) call 911. 25 tablet 1     predniSONE (DELTASONE) 20 MG tablet Take 1 tablet (20 mg) by mouth daily 5 tablet 0     PROAIR  (90 Base) MCG/ACT inhaler INHALE  2 PUFFS INTO THE LUNGS EVERY 4 HOURS AS NEEDED FOR SHORTNESS OF BREATH OR DIFFICULT BREATHING OR WHEEZING 8.5 g 1     ranitidine (ZANTAC) 150 MG tablet Take 150 mg by mouth nightly as needed        ticagrelor (BRILINTA) 90 MG tablet Take 1 tablet (90 mg) by mouth 2 times daily 180 tablet 3     [DISCONTINUED] atorvastatin (LIPITOR) 80 MG tablet Take 0.5 tablets (40 mg) by mouth At Bedtime 45 tablet 1     [DISCONTINUED] sodium chloride (PF) 0.9% PF flush 10 mL        No facility-administered encounter medications on file as of 5/21/2018.        Again, thank you for allowing me to participate in the care of your patient.      Sincerely,    Awais Mathew MD     [facility]

## 2018-05-21 NOTE — MR AVS SNAPSHOT
After Visit Summary   5/21/2018    Romel Bonilla    MRN: [id number]           Patient Information     Date Of Birth          1940        Visit Information        Provider Department      5/21/2018 1:45 PM Awais Mathew MD [facility]        Today's Diagnoses     Essential hypertension with goal blood pressure less than 140/90        Coronary artery disease involving native coronary artery of native heart with other form of angina pectoris (H)        Epigastric discomfort        Bronchiectasis without complication (H)        Pure hypercholesterolemia        Abnormal cardiovascular stress test        HYPERTENSION        Chronic obstructive pulmonary disease, unspecified COPD type (H)           Follow-ups after your visit        Additional Services     Follow-Up with Cardiac Advanced Practice Provider           Follow-Up with Cardiologist                 Your next 10 appointments already scheduled     Jun 07, 2018 12:30 PM CDT   Office Visit with Zac Martínez MD   [facility] ([facility])    [address] [facility] [address]   [phone]           Bring a current list of meds and any records pertaining to this visit. For Physicals, please bring immunization records and any forms needing to be filled out. Please arrive 10 minutes early to complete paperwork.              Future tests that were ordered for you today     Open Future Orders        Priority Expected Expires Ordered    Basic metabolic panel Routine 5/21/2019 5/22/2019 5/21/2018    Lipid Profile Routine 5/21/2019 5/21/2019 5/21/2018    ALT Routine 5/21/2019 5/21/2019 5/21/2018    Echocardiogram Routine 5/21/2019 5/22/2019 5/21/2018    Follow-Up with Cardiologist Routine 5/21/2019 5/22/2019 5/21/2018    Basic metabolic panel Routine 8/19/2018 5/21/2019 5/21/2018    Follow-Up with Cardiac Advanced Practice Provider Routine 8/19/2018 5/21/2019  "5/21/2018    ECHO COMPLETE WITH OPTISON Routine 4/17/2018 12/18/2018 12/18/2017            Who to contact     If you have questions or need follow up information about today's clinic visit or your schedule please contact [facility] directly at [phone].  Normal or non-critical lab and imaging results will be communicated to you by MyChart, letter or phone within 4 business days after the clinic has received the results. If you do not hear from us within 7 days, please contact the clinic through [employer] or phone. If you have a critical or abnormal lab result, we will notify you by phone as soon as possible.  Submit refill requests through [employer] or call your pharmacy and they will forward the refill request to us. Please allow 3 business days for your refill to be completed.          Additional Information About Your Visit        MyChart Information     [employer] gives you secure access to your electronic health record. If you see a primary care provider, you can also send messages to your care team and make appointments. If you have questions, please call your primary care clinic.  If you do not have a primary care provider, please call [phone] and they will assist you.        Care EveryWhere ID     This is your Care EveryWhere ID. This could be used by other organizations to access your [facility] medical records  [id number]        Your Vitals Were     Pulse Height BMI (Body Mass Index)             70 1.803 m (5' 10.98\") 30.84 kg/m2          Blood Pressure from Last 3 Encounters:   05/21/18 114/60   12/18/17 132/58   09/25/17 131/78    Weight from Last 3 Encounters:   05/21/18 100.2 kg (221 lb)   12/18/17 101.2 kg (223 lb)   09/25/17 99.6 kg (219 lb 9.6 oz)              We Performed the Following     Follow-Up with Cardiologist          Today's Medication Changes          These changes are accurate as of 5/21/18  2:39 PM.  If you have any questions, ask your nurse or " doctor.               These medicines have changed or have updated prescriptions.        Dose/Directions    atorvastatin 40 MG tablet   Commonly known as:  LIPITOR   This may have changed:  medication strength   Used for:  Coronary artery disease involving native coronary artery of native heart with other form of angina pectoris (H), Essential hypertension, benign   Changed by:  Awais Mathew MD        Dose:  40 mg   Take 1 tablet (40 mg) by mouth At Bedtime   Quantity:  90 tablet   Refills:  3            Where to get your medicines      These medications were sent to [facility] - [facility] [address]  [address], [facility] [address]     Phone:  [phone]     atorvastatin 40 MG tablet                Primary Care Provider Office Phone # Fax #    Zac Martínez [phone] [address] [address]        Equal Access to Services     [facility] : Hadii aad ku hadasho Soomaali, waaxda luqadaha, qaybta kaalmada adeegyada, blanca iyerin hayunique correia . So [facility] [phone].    ATENCIÓN: Si habla español, tiene a deshpande disposición servicios gratuitos de asistencia lingüística. Kaelyname al [phone].    We comply with applicable federal civil rights laws and [place] laws. We do not discriminate on the basis of race, color, national origin, age, disability, sex, sexual orientation, or gender identity.            Thank you!     Thank you for choosing [facility]  for your care. Our goal is always to provide you with excellent care. Hearing back from our patients is one way we can continue to improve our services. Please take a few minutes to complete the written survey that you may receive in the mail after your visit with us. Thank you!             Your Updated Medication List - Protect others around you: Learn how to safely use, store and throw away your medicines at  www.disposemymeds.org.          This list is accurate as of 5/21/18  2:39 PM.  Always use your most recent med list.                   Brand Name Dispense Instructions for use Diagnosis    ASPIRIN NOT PRESCRIBED    INTENTIONAL    0 each    Antiplatelet medication not prescribed intentionally due to Allergy    ASCVD (arteriosclerotic cardiovascular disease)       atorvastatin 40 MG tablet    LIPITOR    90 tablet    Take 1 tablet (40 mg) by mouth At Bedtime    Coronary artery disease involving native coronary artery of native heart with other form of angina pectoris (H), Essential hypertension, benign       fluticasone 100 MCG/BLIST Aepb    FLOVENT DISKUS    3 Inhaler    Inhale 1 puff into the lungs 2 times daily    Mild persistent asthma without complication       fluticasone 50 MCG/ACT spray    FLONASE    1 Bottle    Spray 1-2 sprays into both nostrils daily    Raspy voice, Congestion of paranasal sinus       metoprolol succinate 25 MG 24 hr tablet    TOPROL XL    90 tablet    Take 1 tablet (25 mg) by mouth At Bedtime    Coronary artery disease involving native coronary artery of native heart with other form of angina pectoris (H)       mometasone 110 MCG/INH inhaler    ASMANEX    3 Inhaler    Inhale 1 puff into the lungs every evening    Mild persistent asthma without complication       MULTIVITAMIN TABS   OR      2 tabs daily    Elevated prostate specific antigen (PSA), Mixed hyperlipidemia       nitroGLYcerin 0.4 MG sublingual tablet    NITROSTAT    25 tablet    Place 1 tablet (0.4 mg) under the tongue every 5 minutes as needed for chest pain if you are still having symptoms after 3 doses (15 minutes) call 911.    Postsurgical percutaneous transluminal coronary angioplasty status, Coronary artery disease involving native coronary artery of native heart with angina pectoris (H), Status post coronary angioplasty       predniSONE 20 MG tablet    DELTASONE    5 tablet    Take 1 tablet (20 mg) by mouth daily     Chronic obstructive pulmonary disease, unspecified COPD type (H)       PROAIR  (90 Base) MCG/ACT Inhaler   Generic drug:  albuterol     8.5 g    INHALE 2 PUFFS INTO THE LUNGS EVERY 4 HOURS AS NEEDED FOR SHORTNESS OF BREATH OR DIFFICULT BREATHING OR WHEEZING    Chronic obstructive pulmonary disease, unspecified COPD type (H)       ranitidine 150 MG tablet    ZANTAC     Take 150 mg by mouth nightly as needed        ticagrelor 90 MG tablet    BRILINTA    180 tablet    Take 1 tablet (90 mg) by mouth 2 times daily    Essential hypertension with goal blood pressure less than 140/90, Coronary artery disease involving native coronary artery of native heart with other form of angina pectoris (H)

## 2018-05-21 NOTE — LETTER
5/21/2018    Zac Martínez MD  [address]  [facility] [address]    RE: Romel Bonilla       Dear Colleague,    I had the pleasure of seeing Romel Bonilla in the [facility].    Service Date: 05/21/2018      HISTORY OF PRESENT ILLNESS:  The patient is a 77-year-old mildly overweight white male who presents to Cardiology Clinic for followup of diagnosis of ischemic heart disease after undergoing cardiac catheterization and coronary angiography with 99% ostial right coronary narrowing which required rotablation as well as aggressive PTCA and stent placement.  Since last visit, he has done well.  He has had no significant symptoms of chest discomfort, shortness of breath, dizziness, palpitations, nausea, vomiting, diaphoresis or syncope.  He is able to participate in most activities without significant restriction.  He previously had an abnormal Cardiolite stress test which correlated with symptoms of dyspnea on exertion.  Risk factors are positive for previous history of cigarette smoking, now stopped, hypercholesterolemia and hypertension.  He was negative for diabetes mellitus or family history of premature coronary disease.  He has been evaluated for sleep apnea.  He has attempted to maintain weight loss, but has been a bit less discretion as of late and gained a few pounds.  He has a chronic history of bronchiectasis/COPD for which he has been treated with inhaler therapy with intermittent pulse steroid therapy as well as IV antibiotics.      MEDICATIONS:   1.  Atorvastatin 40 mg a day.   2.  Flonase as directed.   3.  Flovent as directed.   4.  Metoprolol XL 25 mg at bedtime.   5.  Multivitamins 2 tabs a day.   6.  Nitroglycerin 0.4 mg sublingual p.r.n.   7.  Prednisone 20 mg a day.   8.  ProAir inhaler as directed.   9.  Zantac 150 mg a day, primarily at night.   10.  Ticagrelor 90 mg twice a day.      LABORATORY DATA:  Demonstrates cholesterol 131, HDL 68, LDL 76,  triglyceride 136.  Sodium 139, potassium 4.1, BUN 16, creatinine 0.8.  ALT 23.  Echocardiogram demonstrated a normal-sized left ventricle with intact systolic pressure, ejection fraction 55%-60% with mild aortic insufficiency.  No other severe regional wall motion abnormality.  No other significant valvular insufficiency or stenosis and no change from 2016.        The patient is tolerating his medications reasonably well and does not feel restricted in his activity.      PHYSICAL EXAMINATION:   VITAL SIGNS:  Blood pressure 114/60 with a heart rate of 70-80 and regular.  Weight was 221 pounds, which is stable.   NECK:  Without jugular venous distention, carotid bruit or palpable thyroid.   CHEST:  Essentially clear to percussion and auscultation with decreased breath sounds at the bases, prolonged expiratory phase and isolated scattered crackles.   CARDIAC:  Regular rhythm, soft S4 gallop, 1/6 systolic murmur at the left sternal border radiating to the apex.  No diastolic murmur, rub or S3.   EXTREMITIES:  Without cyanosis or edema.      CLINICAL IMPRESSION:   1.  Stable cardiac condition.   2.  History of ischemic heart disease, status post rotablation, PTCA and stent placed in the ostial right coronary artery with moderate disease elsewhere.   3.  History of chronic bronchiectasis, chronic obstructive pulmonary disease with long cigarette smoking history.   4.  Hypertension with adequate control.   5.  Hyperlipidemia, not quite at goal.   6.  History of diffuse osteoarthritis.      DISCUSSION:  The patient has done well from a cardiac viewpoint.  He has had no significant symptoms of angina pectoris or congestive heart failure.  Serum lipids are not quite at goal.  He has felt better since his last clinic visit and feels that he is stable.  He is tolerating his medications unchanged.  Echocardiogram shows intact left ventricular function.  Brilinta will be continued because he has itching response to clopidogrel  and has an allergy to aspirin.      RECOMMENDATIONS:   1.  Continue present medications.   2.  Close followup of serum lipids, basic metabolic panel and blood pressure with followup with [facility] in 3 months.   3.  Aggressive pulmonary therapy.   4.  Aggressive GI therapy for GERD.   5.  Consider sleep evaluation.   6.  Diet and exercise as tolerated, avoiding cholesterol and carbohydrates.   7.  Routine medical followup.   8.  Cardiology followup in 1 year.        cc:   Zac Martínez MD    [address]    [place]  [address]         SHAE VALVERDE MD, [facility]             D: 2018   T: 2018   MT: CHRISTOPHER      Name:     WESLEY SPARKS   MRN:      [id number]        Account:      [id number]   :      1940           Service Date: 2018      Document: [id number]       Thank you for allowing me to participate in the care of your patient.      Sincerely,     Shae Valverde MD     [facility]    cc:   Shae Valverde MD  [address] [facility] [address]  [place] [address]

## 2018-05-22 NOTE — PROGRESS NOTES
Service Date: 05/21/2018      HISTORY OF PRESENT ILLNESS:  The patient is a 77-year-old mildly overweight white male who presents to Cardiology Clinic for followup of diagnosis of ischemic heart disease after undergoing cardiac catheterization and coronary angiography with 99% ostial right coronary narrowing which required rotablation as well as aggressive PTCA and stent placement.  Since last visit, he has done well.  He has had no significant symptoms of chest discomfort, shortness of breath, dizziness, palpitations, nausea, vomiting, diaphoresis or syncope.  He is able to participate in most activities without significant restriction.  He previously had an abnormal Cardiolite stress test which correlated with symptoms of dyspnea on exertion.  Risk factors are positive for previous history of cigarette smoking, now stopped, hypercholesterolemia and hypertension.  He was negative for diabetes mellitus or family history of premature coronary disease.  He has been evaluated for sleep apnea.  He has attempted to maintain weight loss, but has been a bit less discretion as of late and gained a few pounds.  He has a chronic history of bronchiectasis/COPD for which he has been treated with inhaler therapy with intermittent pulse steroid therapy as well as IV antibiotics.      MEDICATIONS:   1.  Atorvastatin 40 mg a day.   2.  Flonase as directed.   3.  Flovent as directed.   4.  Metoprolol XL 25 mg at bedtime.   5.  Multivitamins 2 tabs a day.   6.  Nitroglycerin 0.4 mg sublingual p.r.n.   7.  Prednisone 20 mg a day.   8.  ProAir inhaler as directed.   9.  Zantac 150 mg a day, primarily at night.   10.  Ticagrelor 90 mg twice a day.      LABORATORY DATA:  Demonstrates cholesterol 131, HDL 68, LDL 76, triglyceride 136.  Sodium 139, potassium 4.1, BUN 16, creatinine 0.8.  ALT 23.  Echocardiogram demonstrated a normal-sized left ventricle with intact systolic pressure, ejection fraction 55%-60% with mild aortic  insufficiency.  No other severe regional wall motion abnormality.  No other significant valvular insufficiency or stenosis and no change from 2016.        The patient is tolerating his medications reasonably well and does not feel restricted in his activity.      PHYSICAL EXAMINATION:   VITAL SIGNS:  Blood pressure 114/60 with a heart rate of 70-80 and regular.  Weight was 221 pounds, which is stable.   NECK:  Without jugular venous distention, carotid bruit or palpable thyroid.   CHEST:  Essentially clear to percussion and auscultation with decreased breath sounds at the bases, prolonged expiratory phase and isolated scattered crackles.   CARDIAC:  Regular rhythm, soft S4 gallop, 1/6 systolic murmur at the left sternal border radiating to the apex.  No diastolic murmur, rub or S3.   EXTREMITIES:  Without cyanosis or edema.      CLINICAL IMPRESSION:   1.  Stable cardiac condition.   2.  History of ischemic heart disease, status post rotablation, PTCA and stent placed in the ostial right coronary artery with moderate disease elsewhere.   3.  History of chronic bronchiectasis, chronic obstructive pulmonary disease with long cigarette smoking history.   4.  Hypertension with adequate control.   5.  Hyperlipidemia, not quite at goal.   6.  History of diffuse osteoarthritis.      DISCUSSION:  The patient has done well from a cardiac viewpoint.  He has had no significant symptoms of angina pectoris or congestive heart failure.  Serum lipids are not quite at goal.  He has felt better since his last clinic visit and feels that he is stable.  He is tolerating his medications unchanged.  Echocardiogram shows intact left ventricular function.  Brilinta will be continued because he has itching response to clopidogrel and has an allergy to aspirin.      RECOMMENDATIONS:   1.  Continue present medications.   2.  Close followup of serum lipids, basic metabolic panel and blood pressure with followup with Luh Curry in 3 months.    3.  Aggressive pulmonary therapy.   4.  Aggressive GI therapy for GERD.   5.  Consider sleep evaluation.   6.  Diet and exercise as tolerated, avoiding cholesterol and carbohydrates.   7.  Routine medical followup.   8.  Cardiology followup in 1 year.        cc:   Zac Martínez MD    [address]    [place]  [address]         SHAE VALVERDE MD, [facility]             D: 2018   T: 2018   MT: CHRISTOPHER      Name:     WESLEY SPARKS   MRN:      [id number]        Account:      [id number]   :      1940           Service Date: 2018      Document: [id number]

## 2018-06-26 ENCOUNTER — TELEPHONE (OUTPATIENT)
Dept: FAMILY MEDICINE | Facility: CLINIC | Age: 78
End: 2018-06-26

## 2018-06-26 ENCOUNTER — OFFICE VISIT (OUTPATIENT)
Dept: FAMILY MEDICINE | Facility: CLINIC | Age: 78
End: 2018-06-26
Payer: COMMERCIAL

## 2018-06-26 VITALS
HEIGHT: 70 IN | DIASTOLIC BLOOD PRESSURE: 80 MMHG | HEART RATE: 79 BPM | SYSTOLIC BLOOD PRESSURE: 127 MMHG | TEMPERATURE: 97.4 F | BODY MASS INDEX: 31.35 KG/M2 | OXYGEN SATURATION: 96 % | WEIGHT: 219 LBS

## 2018-06-26 DIAGNOSIS — J45.20 MILD INTERMITTENT REACTIVE AIRWAY DISEASE WITHOUT COMPLICATION: Primary | ICD-10-CM

## 2018-06-26 PROCEDURE — 99213 OFFICE O/P EST LOW 20 MIN: CPT | Performed by: NURSE PRACTITIONER

## 2018-06-26 RX ORDER — FLUTICASONE PROPIONATE 110 UG/1
2 AEROSOL, METERED RESPIRATORY (INHALATION) 2 TIMES DAILY
Qty: 1 INHALER | Refills: 1 | Status: SHIPPED | OUTPATIENT
Start: 2018-06-26 | End: 2018-07-16

## 2018-06-26 NOTE — MR AVS SNAPSHOT
After Visit Summary   6/26/2018    Romel Bonilla    MRN: [id number]           Patient Information     Date Of Birth          1940        Visit Information        Provider Department      6/26/2018 9:30 AM Concepción Lane APRN CNP [facility]        Today's Diagnoses     Mild intermittent reactive airway disease without complication    -  1      Care Instructions    Begin taking zyrtec 10mg daily  Also use flonase 2 sprays each nostril once a day  Start plain mucinex  600mg twice a day- no D  Push fluids  Continue the albuterol inhaler 2 puffs twice a day  Begin using flovent (steroid inhaler) 2 inhalations twice a day for at least the next 3 weeks or if your cough persists use it longer - it is not at all dangerous.  Do rinse and spit after use of the flovent so as not to get thrush               Follow-ups after your visit        Your next 10 appointments already scheduled     Jul 16, 2018 10:00 AM CDT   [facility] Zac [facility]    [address] [place] [id number] [phone]           Bring a current list of meds and any records pertaining to this visit. For Physicals, please bring immunization records and any forms needing to be filled out. Please arrive 10 minutes early to complete paperwork.            Aug 20, 2018  9:00 AM CDT   LAB with [facility]   [facility] [place] ([facility])    [address] [place] [id number] [phone]           Please do not eat 10-12 hours before your appointment if you are coming in fasting for labs on lipids, cholesterol, or glucose (sugar). This does not apply to pregnant women. Water, hot tea and black coffee (with nothing added) are okay. Do not drink other fluids, diet soda or chew gum.            Aug 20, 2018 10:00 AM CDT   Return Visit with FELY Mccormack CNP   [facility]  [facility]    [address]  [place] [address] [phone] OPT 2              Who to contact     If you have questions or need follow up information about today's clinic visit or your schedule please contact [facility] directly at [phone].  Normal or non-critical lab and imaging results will be communicated to you by MyChart, letter or phone within 4 business days after the clinic has received the results. If you do not hear from us within 7 days, please contact the clinic through [employer] or phone. If you have a critical or abnormal lab result, we will notify you by phone as soon as possible.  Submit refill requests through [employer] or call your pharmacy and they will forward the refill request to us. Please allow 3 business days for your refill to be completed.          Additional Information About Your Visit        [employer] Information     [employer] gives you secure access to your electronic health record. If you see a primary care provider, you can also send messages to your care team and make appointments. If you have questions, please call your primary care clinic.  If you do not have a primary care provider, please call [phone] and they will assist you.        Care EveryWhere ID     This is your Care EveryWhere ID. This could be used by other organizations to access your [place] medical records  [id number]        Your Vitals Were     Pulse Temperature Height Pulse Oximetry BMI (Body Mass Index)       79 97.4  F (36.3  C) (Oral) 5' 10\" (1.778 m) 96% 31.42 kg/m2        Blood Pressure from Last 3 Encounters:   06/26/18 127/80   05/21/18 114/60   12/18/17 132/58    Weight from Last 3 Encounters:   06/26/18 219 lb (99.3 kg)   05/21/18 221 lb (100.2 kg)   12/18/17 223 lb (101.2 kg)              Today, you had the following     No orders found for display         Today's Medication Changes          These changes are accurate as of " 6/26/18 10:03 AM.  If you have any questions, ask your nurse or doctor.               Start taking these medicines.        Dose/Directions    fluticasone 110 MCG/ACT Inhaler   Commonly known as:  FLOVENT HFA   Used for:  Mild intermittent reactive airway disease without complication   Started by:  Concepción Lane APRN CNP        Dose:  2 puff   Inhale 2 puffs into the lungs 2 times daily   Quantity:  1 Inhaler   Refills:  1            Where to get your medicines      These medications were sent to [id number]  [place], [place] - [address]  [address], [place] [address]     Phone:  [phone]     fluticasone 110 MCG/ACT Inhaler                Primary Care Provider Office Phone # Fax #    Zac Martínez [phone] [address]  [place] [address]        Equal Access to Services     [facility]: Hadii abiola ku hadasho Soomaali, waaxda luqadaha, qaybta kaalmada adeegyada, waxay jaylonin hayjayashreen nellie correia . So [facility] [phone].    ATENCIÓN: Si habla español, tiene a deshpande disposición servicios gratuitos de asistencia lingüística. Llame al [phone].    We comply with applicable federal civil rights laws and [place] laws. We do not discriminate on the basis of race, color, national origin, age, disability, sex, sexual orientation, or gender identity.            Thank you!     Thank you for choosing [facility]  for your care. Our goal is always to provide you with excellent care. Hearing back from our patients is one way we can continue to improve our services. Please take a few minutes to complete the written survey that you may receive in the mail after your visit with us. Thank you!             Your Updated Medication List - Protect others around you: Learn how to safely use, store and throw away your medicines at www.disposemymeds.org.          This list is accurate as of 6/26/18 10:03 AM.   Always use your most recent med list.                   Brand Name Dispense Instructions for use Diagnosis    ASPIRIN NOT PRESCRIBED    INTENTIONAL    0 each    Antiplatelet medication not prescribed intentionally due to Allergy    ASCVD (arteriosclerotic cardiovascular disease)       atorvastatin 40 MG tablet    LIPITOR    90 tablet    Take 1 tablet (40 mg) by mouth At Bedtime    Coronary artery disease involving native coronary artery of native heart with other form of angina pectoris (H), Essential hypertension, benign       fluticasone 100 MCG/BLIST Aepb    FLOVENT DISKUS    3 Inhaler    Inhale 1 puff into the lungs 2 times daily    Mild persistent asthma without complication       fluticasone 110 MCG/ACT Inhaler    FLOVENT HFA    1 Inhaler    Inhale 2 puffs into the lungs 2 times daily    Mild intermittent reactive airway disease without complication       fluticasone 50 MCG/ACT spray    FLONASE    1 Bottle    Spray 1-2 sprays into both nostrils daily    Raspy voice, Congestion of paranasal sinus       metoprolol succinate 25 MG 24 hr tablet    TOPROL XL    90 tablet    Take 1 tablet (25 mg) by mouth At Bedtime    Coronary artery disease involving native coronary artery of native heart with other form of angina pectoris (H)       mometasone 110 MCG/INH inhaler    ASMANEX    3 Inhaler    Inhale 1 puff into the lungs every evening    Mild persistent asthma without complication       MULTIVITAMIN TABS   OR      2 tabs daily    Elevated prostate specific antigen (PSA), Mixed hyperlipidemia       nitroGLYcerin 0.4 MG sublingual tablet    NITROSTAT    25 tablet    Place 1 tablet (0.4 mg) under the tongue every 5 minutes as needed for chest pain if you are still having symptoms after 3 doses (15 minutes) call 911.    Postsurgical percutaneous transluminal coronary angioplasty status, Coronary artery disease involving native coronary artery of native heart with angina pectoris (H), Status post coronary angioplasty        predniSONE 20 MG tablet    DELTASONE    5 tablet    Take 1 tablet (20 mg) by mouth daily    Chronic obstructive pulmonary disease, unspecified COPD type (H)       PROAIR  (90 Base) MCG/ACT Inhaler   Generic drug:  albuterol     8.5 g    INHALE 2 PUFFS INTO THE LUNGS EVERY 4 HOURS AS NEEDED FOR SHORTNESS OF BREATH OR DIFFICULT BREATHING OR WHEEZING    Chronic obstructive pulmonary disease, unspecified COPD type (H)       ranitidine 150 MG tablet    ZANTAC     Take 150 mg by mouth nightly as needed        ticagrelor 90 MG tablet    BRILINTA    180 tablet    Take 1 tablet (90 mg) by mouth 2 times daily    Essential hypertension with goal blood pressure less than 140/90, Coronary artery disease involving native coronary artery of native heart with other form of angina pectoris (H)

## 2018-06-26 NOTE — TELEPHONE ENCOUNTER
"Concepción: Pt is confused about inhaler instructions on AVS from OV today:     - Continue the albuterol inhaler 2 puffs twice a day     He only uses his Proair (Albuterol) (HFA) as needed, hasn't needed it for years \"it just sits here.\" Should he start using twice daily?     - Begin using flovent (steroid inhaler) 2 inhalations twice a day for at least the next 3 weeks or if your cough persists use it longer - it is not at all dangerous.  Do rinse and spit after use of the flovent so as not to get thrush    He has been taking Flovent Diskus for years, should he start using Flovent HFA Inhaler, instead?     Please advise,     Thank you,   Laureen CROCKETT RN              "

## 2018-06-26 NOTE — PROGRESS NOTES
SUBJECTIVE:   Romel Bonilla is a 77 year old male who presents to clinic today for the following health issues:      RESPIRATORY SYMPTOMS      Duration: x 4-5 days    Description  nasal congestion, rhinorrhea, sore throat, cough, wheezing, fatigue/malaise and hoarse voice    Severity: moderate    Accompanying signs and symptoms: fluid intake good ; appetite good    History (predisposing factors): HX  tobacco abuse 25 years ago    Precipitating or alleviating factors: None  Therapies tried and outcome:  rest and fluids acetaminophen guaifenesin prednisone-( prednisone has helped in the past).  Using albuteral one inhalation twice a day    Problem list and histories reviewed & adjusted, as indicated.  Additional history: as documented    Patient Active Problem List   Diagnosis     Benign essential hypertension     Anxiety     Lung nodule     Prostate CA (H)     Hyperglycemia     PND (post-nasal drip)     Coronary artery disease involving native coronary artery of native heart without angina pectoris     Chronic obstructive pulmonary disease, unspecified COPD type (H)     Gastroesophageal reflux disease without esophagitis     Mild persistent asthma without complication     Pure hypercholesterolemia     Aortic root dilation (H)     Ascending aorta dilatation (H)     Urinary retention     Past Surgical History:   Procedure Laterality Date     C NONSPECIFIC PROCEDURE  7/01    Rt knee arthroscopy and menisectomy     C NONSPECIFIC PROCEDURE  2000    RT HERNIA REPAIR     CATARACT IOL, RT/LT  2016     HEART CATH LEFT HEART CATH  3/14/16    95% ostail RCA stenosis>>CECILIO placed     INSERT RADIATION SEEDS PROSTATE  7/10/2012    seeds and xrt     ROTATOR CUFF REPAIR RT/LT  2016       Social History   Substance Use Topics     Smoking status: Former Smoker     Packs/day: 1.00     Years: 20.00     Types: Cigarettes     Quit date: 1/26/1994     Smokeless tobacco: Never Used     Alcohol use 0.0 oz/week     0 Standard drinks or  equivalent per week      Comment: occ beer     Family History   Problem Relation Age of Onset     Myocardial Infarction Mother      Other - See Comments Mother      Angina     Myocardial Infarction Father      Arthritis Father      Family History Negative Sister      Lupus Sister      Obesity Sister      Cancer Brother      Family History Negative Son      Family History Negative Son      Family History Negative Son      Family History Negative Son      Unknown/Adopted Maternal Grandmother      Myocardial Infarction Maternal Grandfather      40s or 50s     Unknown/Adopted Paternal Grandmother      Unknown/Adopted Paternal Grandfather          Current Outpatient Prescriptions   Medication Sig Dispense Refill     ASPIRIN NOT PRESCRIBED (INTENTIONAL) Antiplatelet medication not prescribed intentionally due to Allergy 0 each 0     atorvastatin (LIPITOR) 40 MG tablet Take 1 tablet (40 mg) by mouth At Bedtime 90 tablet 3     fluticasone (FLONASE) 50 MCG/ACT spray Spray 1-2 sprays into both nostrils daily 1 Bottle 3     fluticasone (FLOVENT DISKUS) 100 MCG/BLIST AEPB Inhale 1 puff into the lungs 2 times daily 3 Inhaler 3     metoprolol (TOPROL XL) 25 MG 24 hr tablet Take 1 tablet (25 mg) by mouth At Bedtime 90 tablet 3     mometasone (ASMANEX) 110 MCG/INH inhaler Inhale 1 puff into the lungs every evening 3 Inhaler 3     MULTIVITAMIN TABS   OR 2 tabs daily       nitroglycerin (NITROSTAT) 0.4 MG SL tablet Place 1 tablet (0.4 mg) under the tongue every 5 minutes as needed for chest pain if you are still having symptoms after 3 doses (15 minutes) call 911. 25 tablet 1     predniSONE (DELTASONE) 20 MG tablet Take 1 tablet (20 mg) by mouth daily 5 tablet 0     PROAIR  (90 Base) MCG/ACT inhaler INHALE 2 PUFFS INTO THE LUNGS EVERY 4 HOURS AS NEEDED FOR SHORTNESS OF BREATH OR DIFFICULT BREATHING OR WHEEZING 8.5 g 1     ranitidine (ZANTAC) 150 MG tablet Take 150 mg by mouth nightly as needed        ticagrelor (BRILINTA) 90  "MG tablet Take 1 tablet (90 mg) by mouth 2 times daily 180 tablet 3     Allergies   Allergen Reactions     Cefprozil Diarrhea     Aspirin      Urinary retention     Plavix [Clopidogrel] Itching       Reviewed and updated as needed this visit by clinical staff  Tobacco  Allergies  Soc Hx      Reviewed and updated as needed this visit by Provider         ROS:  Constitutional, HEENT, cardiovascular, pulmonary, gi and gu systems are negative, except as otherwise noted.  CONSTI: no fever or chills  OBJECTIVE:     /80 (BP Location: Left arm, Patient Position: Sitting, Cuff Size: Adult Regular)  Pulse 79  Temp 97.4  F (36.3  C) (Oral)  Ht 5' 10\" (1.778 m)  Wt 219 lb (99.3 kg)  SpO2 96%  BMI 31.42 kg/m2  Body mass index is 31.42 kg/(m^2).  GENERAL: healthy, alert and mild to mod distress  EYES: Eyes grossly normal to inspection, PERRL and conjunctivae mildly injected and sclerae normal  HENT: ear canals and TM's normal, nose and mouth without ulcers or lesions  NECK: no adenopathy, no asymmetry, masses, or scars and thyroid normal to palpation  RESP: lungs clear to auscultation - no rales, rhonchi or wheezes  CV: regular rate and rhythm, normal S1 S2, no S3 or S4, no murmur, click or rub, no peripheral edema     Diagnostic Test Results:  none     ASSESSMENT/PLAN:       ICD-10-CM    1. Mild intermittent reactive airway disease without complication J45.20 fluticasone (FLOVENT HFA) 110 MCG/ACT Inhaler       Patient Instructions   Begin taking zyrtec 10mg daily  Also use flonase 2 sprays each nostril once a day  Start plain mucinex  600mg twice a day- no D  Push fluids  Continue the albuterol inhaler 2 puffs twice a day  Begin using flovent (steroid inhaler) 2 inhalations twice a day for at least the next 3 weeks or if your cough persists use it longer - it is not at all dangerous.  Do rinse and spit after use of the flovent so as not to get thrush       Addenda:  Mr Cornelison called me back.  He has been using a " flovent diskus not the proair prescribed for him in May.  So he is now instructed to start the proair 2puffs twice a day until cough resolved and continue his flovent diskus as he has been using it.      FELY Silva CNP  [facility]

## 2018-06-26 NOTE — PATIENT INSTRUCTIONS
Begin taking zyrtec 10mg daily  Also use flonase 2 sprays each nostril once a day  Start plain mucinex  600mg twice a day- no D  Push fluids  Continue the albuterol inhaler 2 puffs twice a day  Begin using flovent (steroid inhaler) 2 inhalations twice a day for at least the next 3 weeks or if your cough persists use it longer - it is not at all dangerous.  Do rinse and spit after use of the flovent so as not to get thrush

## 2018-06-26 NOTE — TELEPHONE ENCOUNTER
Reason for Call:  Other prescription    Detailed comments: patient saw Concepción Lane this morning,  is confused regarding the use of inhalers.   He is asking for a call back from Concepción Lane to go over them.     Phone Number Patient can be reached at: Home number on file [phone] (home)    Best Time: any    Can we leave a detailed message on this number? YES    Call taken on 6/26/2018 at 11:57 AM by Sarah Luke

## 2018-07-16 ENCOUNTER — OFFICE VISIT (OUTPATIENT)
Dept: FAMILY MEDICINE | Facility: CLINIC | Age: 78
End: 2018-07-16
Payer: COMMERCIAL

## 2018-07-16 VITALS
DIASTOLIC BLOOD PRESSURE: 77 MMHG | BODY MASS INDEX: 31.52 KG/M2 | WEIGHT: 220.2 LBS | HEART RATE: 78 BPM | SYSTOLIC BLOOD PRESSURE: 126 MMHG | TEMPERATURE: 97.6 F | OXYGEN SATURATION: 96 % | HEIGHT: 70 IN

## 2018-07-16 DIAGNOSIS — J45.30 MILD PERSISTENT ASTHMA WITHOUT COMPLICATION: ICD-10-CM

## 2018-07-16 DIAGNOSIS — C61 PROSTATE CA (H): ICD-10-CM

## 2018-07-16 DIAGNOSIS — R73.9 HYPERGLYCEMIA: ICD-10-CM

## 2018-07-16 DIAGNOSIS — I10 BENIGN ESSENTIAL HYPERTENSION: ICD-10-CM

## 2018-07-16 DIAGNOSIS — J44.9 CHRONIC OBSTRUCTIVE PULMONARY DISEASE, UNSPECIFIED COPD TYPE (H): Primary | ICD-10-CM

## 2018-07-16 DIAGNOSIS — E78.00 PURE HYPERCHOLESTEROLEMIA: ICD-10-CM

## 2018-07-16 DIAGNOSIS — I25.10 CORONARY ARTERY DISEASE INVOLVING NATIVE CORONARY ARTERY OF NATIVE HEART WITHOUT ANGINA PECTORIS: ICD-10-CM

## 2018-07-16 DIAGNOSIS — I77.810 ASCENDING AORTA DILATATION (H): ICD-10-CM

## 2018-07-16 LAB
ANION GAP SERPL CALCULATED.3IONS-SCNC: 8 MMOL/L (ref 3–14)
BUN SERPL-MCNC: 17 MG/DL (ref 7–30)
CALCIUM SERPL-MCNC: 9 MG/DL (ref 8.5–10.1)
CHLORIDE SERPL-SCNC: 110 MMOL/L (ref 94–109)
CHOLEST SERPL-MCNC: 166 MG/DL
CO2 SERPL-SCNC: 22 MMOL/L (ref 20–32)
CREAT SERPL-MCNC: 0.93 MG/DL (ref 0.66–1.25)
ERYTHROCYTE [DISTWIDTH] IN BLOOD BY AUTOMATED COUNT: 13.3 % (ref 10–15)
GFR SERPL CREATININE-BSD FRML MDRD: 78 ML/MIN/1.7M2
GLUCOSE SERPL-MCNC: 107 MG/DL (ref 70–99)
HBA1C MFR BLD: 5.7 % (ref 0–5.6)
HCT VFR BLD AUTO: 40.8 % (ref 40–53)
HDLC SERPL-MCNC: 63 MG/DL
HGB BLD-MCNC: 13.8 G/DL (ref 13.3–17.7)
LDLC SERPL CALC-MCNC: 82 MG/DL
MCH RBC QN AUTO: 31.9 PG (ref 26.5–33)
MCHC RBC AUTO-ENTMCNC: 33.8 G/DL (ref 31.5–36.5)
MCV RBC AUTO: 94 FL (ref 78–100)
NONHDLC SERPL-MCNC: 103 MG/DL
PLATELET # BLD AUTO: 249 10E9/L (ref 150–450)
POTASSIUM SERPL-SCNC: 4.2 MMOL/L (ref 3.4–5.3)
PSA SERPL-MCNC: <0.01 UG/L (ref 0–4)
RBC # BLD AUTO: 4.32 10E12/L (ref 4.4–5.9)
SODIUM SERPL-SCNC: 140 MMOL/L (ref 133–144)
TRIGL SERPL-MCNC: 107 MG/DL
WBC # BLD AUTO: 5.7 10E9/L (ref 4–11)

## 2018-07-16 PROCEDURE — 83036 HEMOGLOBIN GLYCOSYLATED A1C: CPT | Performed by: INTERNAL MEDICINE

## 2018-07-16 PROCEDURE — 36415 COLL VENOUS BLD VENIPUNCTURE: CPT | Performed by: INTERNAL MEDICINE

## 2018-07-16 PROCEDURE — 99214 OFFICE O/P EST MOD 30 MIN: CPT | Performed by: INTERNAL MEDICINE

## 2018-07-16 PROCEDURE — 80048 BASIC METABOLIC PNL TOTAL CA: CPT | Performed by: INTERNAL MEDICINE

## 2018-07-16 PROCEDURE — 85027 COMPLETE CBC AUTOMATED: CPT | Performed by: INTERNAL MEDICINE

## 2018-07-16 PROCEDURE — 80061 LIPID PANEL: CPT | Performed by: INTERNAL MEDICINE

## 2018-07-16 PROCEDURE — 84153 ASSAY OF PSA TOTAL: CPT | Performed by: INTERNAL MEDICINE

## 2018-07-16 RX ORDER — GUAIFENESIN 600 MG/1
600 TABLET, EXTENDED RELEASE ORAL 2 TIMES DAILY
Qty: 20 TABLET | Refills: 0 | COMMUNITY
End: 2020-04-14

## 2018-07-16 RX ORDER — METOPROLOL SUCCINATE 25 MG/1
25 TABLET, EXTENDED RELEASE ORAL AT BEDTIME
Qty: 90 TABLET | Refills: 3 | Status: CANCELLED | OUTPATIENT
Start: 2018-07-16

## 2018-07-16 RX ORDER — AMPICILLIN TRIHYDRATE 250 MG
1 CAPSULE ORAL DAILY
COMMUNITY
End: 2023-07-05

## 2018-07-16 RX ORDER — TURMERIC ROOT EXTRACT 500 MG
2 TABLET ORAL DAILY
Status: ON HOLD | COMMUNITY
End: 2018-09-19

## 2018-07-16 RX ORDER — CETIRIZINE HYDROCHLORIDE 10 MG/1
10 TABLET ORAL EVERY EVENING
Qty: 30 TABLET | Refills: 1 | COMMUNITY

## 2018-07-16 NOTE — PROGRESS NOTES
It was a please seeing you.  You should be able to view your labs.    Your labs look very good.  This includes your blood salts, kidney tests, psa test, blood count and cholesterol.  Your sugar is just slightly elevated so please be sure to exercise and eat a healthy diet for this.    If you have any questions please call me.    Zac Martínez M.D.

## 2018-07-16 NOTE — MR AVS SNAPSHOT
After Visit Summary   7/16/2018    Romel Bonilla    MRN: [id number]           Patient Information     Date Of Birth          1940        Visit Information        Provider Department      7/16/2018 10:00 AM Zac Martínez MD [facility]        Today's Diagnoses     Chronic obstructive pulmonary disease, unspecified COPD type (H)    -  1    Prostate CA (H)        Ascending aorta dilatation (H)        Mild persistent asthma without complication        Coronary artery disease involving native coronary artery of native heart without angina pectoris        Benign essential hypertension        Hyperglycemia        Pure hypercholesterolemia          Care Instructions    You do not need to use the metoprolol.    You should use the flovent inhaler one puff twice daily every day. You can use the albuterol inhaler but just if needed, meaning for shortness of breath or worsening cough.    I would recommend getting the new shingles shot called shingrix, but I would do it at your pharmacy as they can check with the insurance company to see if it is paid for.    Zac Martínez M.D.                Follow-ups after your visit        Your next 10 appointments already scheduled     Aug 20, 2018  9:00 AM CDT   LAB with [facility]   [facility] ([facility])    [address] [place] [id number] [phone]           Please do not eat 10-12 hours before your appointment if you are coming in fasting for labs on lipids, cholesterol, or glucose (sugar). This does not apply to pregnant women. Water, hot tea and black coffee (with nothing added) are okay. Do not drink other fluids, diet soda or chew gum.            Aug 20, 2018 10:00 AM CDT   Return Visit with FELY Mccormack CNP   [facility] ([facility])    [address] [place], [place] [id number]  [facility] [id number] [phone] OPT 2     "          Who to contact     If you have questions or need follow up information about today's clinic visit or your schedule please contact [facility] directly at [phone].  Normal or non-critical lab and imaging results will be communicated to you by [employer], letter or phone within 4 business days after the clinic has received the results. If you do not hear from us within 7 days, please contact the clinic through [employer] or phone. If you have a critical or abnormal lab result, we will notify you by phone as soon as possible.  Submit refill requests through [employer] or call your pharmacy and they will forward the refill request to us. Please allow 3 business days for your refill to be completed.          Additional Information About Your Visit        [employer] Information     [employer] gives you secure access to your electronic health record. If you see a primary care provider, you can also send messages to your care team and make appointments. If you have questions, please call your primary care clinic.  If you do not have a primary care provider, please call [phone] and they will assist you.        Care EveryWhere ID     This is your Care EveryWhere ID. This could be used by other organizations to access your [facility] medical records  [id number]        Your Vitals Were     Pulse Temperature Height Pulse Oximetry BMI (Body Mass Index)       78 97.6  F (36.4  C) (Oral) 5' 10\" (1.778 m) 96% 31.6 kg/m2        Blood Pressure from Last 3 Encounters:   07/16/18 126/77   06/26/18 127/80   05/21/18 114/60    Weight from Last 3 Encounters:   07/16/18 220 lb 3.2 oz (99.9 kg)   06/26/18 219 lb (99.3 kg)   05/21/18 221 lb (100.2 kg)              We Performed the Following     Basic metabolic panel     CBC with platelets     Hemoglobin A1c     Lipid panel reflex to direct LDL Non-fasting     PSA tumor marker          Today's Medication Changes          These changes are accurate as of 7/16/18 10:16 AM.  If " you have any questions, ask your nurse or doctor.               Stop taking these medicines if you haven't already. Please contact your care team if you have questions.     fluticasone 110 MCG/ACT Inhaler   Commonly known as:  FLOVENT HFA   Stopped by:  Zac Martínez MD           metoprolol succinate 25 MG 24 hr tablet   Commonly known as:  TOPROL XL   Stopped by:  Zac Martínez MD           mometasone 110 MCG/INH inhaler   Commonly known as:  ASMANEX   Stopped by:  Zac Martínez MD           predniSONE 20 MG tablet   Commonly known as:  DELTASONE   Stopped by:  Zac Martínez MD           ranitidine 150 MG tablet   Commonly known as:  ZANTAC   Stopped by:  Zac Martínez MD                    Primary Care Provider Office Phone # Fax #    Zac Martínez [phone] [address]  [facility] [address]        Equal Access to Services     [facility]: Hadii aad ku hadasho Soomaali, waaxda luqadaha, qaybta kaalmada adeegyada, waxay idiin hayaan nellie juniorararisa correia . So [facility] [phone].    ATENCIÓN: Si sierrala español, tiene a deshpande disposición servicios gratuitos de asistencia lingüística. Llame al [phone].    We comply with applicable federal civil rights laws and [place] laws. We do not discriminate on the basis of race, color, national origin, age, disability, sex, sexual orientation, or gender identity.            Thank you!     Thank you for choosing [facility]  for your care. Our goal is always to provide you with excellent care. Hearing back from our patients is one way we can continue to improve our services. Please take a few minutes to complete the written survey that you may receive in the mail after your visit with us. Thank you!             Your Updated Medication List - Protect others around you: Learn how to safely use, store and throw away your medicines at www.disposemymeds.org.          This list is accurate as  of 7/16/18 10:16 AM.  Always use your most recent med list.                   Brand Name Dispense Instructions for use Diagnosis    ASPIRIN NOT PRESCRIBED    INTENTIONAL    0 each    Antiplatelet medication not prescribed intentionally due to Allergy    ASCVD (arteriosclerotic cardiovascular disease)       atorvastatin 40 MG tablet    LIPITOR    90 tablet    Take 1 tablet (40 mg) by mouth At Bedtime    Coronary artery disease involving native coronary artery of native heart with other form of angina pectoris (H), Essential hypertension, benign       cetirizine 10 MG tablet    zyrTEC    30 tablet    Take 1 tablet (10 mg) by mouth every evening        cinnamon 500 MG Caps      Take 1 capsule by mouth daily        fluticasone 100 MCG/BLIST Aepb    FLOVENT DISKUS    3 Inhaler    Inhale 1 puff into the lungs 2 times daily    Mild persistent asthma without complication       fluticasone 50 MCG/ACT spray    FLONASE    1 Bottle    Spray 1-2 sprays into both nostrils daily    Raspy voice, Congestion of paranasal sinus       MUCINEX 600 MG 12 hr tablet   Generic drug:  guaiFENesin     20 tablet    Take 1 tablet (600 mg) by mouth 2 times daily        MULTIVITAMIN TABS   OR      2 tabs daily    Elevated prostate specific antigen (PSA), Mixed hyperlipidemia       nitroGLYcerin 0.4 MG sublingual tablet    NITROSTAT    25 tablet    Place 1 tablet (0.4 mg) under the tongue every 5 minutes as needed for chest pain if you are still having symptoms after 3 doses (15 minutes) call 911.    Postsurgical percutaneous transluminal coronary angioplasty status, Coronary artery disease involving native coronary artery of native heart with angina pectoris (H), Status post coronary angioplasty       PROAIR  (90 Base) MCG/ACT Inhaler   Generic drug:  albuterol     8.5 g    INHALE 2 PUFFS INTO THE LUNGS EVERY 4 HOURS AS NEEDED FOR SHORTNESS OF BREATH OR DIFFICULT BREATHING OR WHEEZING    Chronic obstructive pulmonary disease, unspecified  COPD type (H)       ticagrelor 90 MG tablet    BRILINTA    180 tablet    Take 1 tablet (90 mg) by mouth 2 times daily    Essential hypertension with goal blood pressure less than 140/90, Coronary artery disease involving native coronary artery of native heart with other form of angina pectoris (H)       Turmeric 500 MG Tabs      Take 2 tablets by mouth daily

## 2018-07-16 NOTE — PROGRESS NOTES
The patient is here with his female friend for multiple issues.    The patient has COPD/asthma.  He has been on medications for a long time but there is a bit of confusion.  Usually takes the Flovent 1 puff twice daily but recently he was here as noted and was told to use the albuterol and not the Flovent.  He is been on the Flovent for quite a long time.  He does note that his cough is much better, and now back to baseline meaning he coughs some in the morning but not throughout the day.  He is not having chest pain or shortness of breath.  No fevers.  He is using a Pippa pot now for sinus drainage and that seems to help quite a bit.    The patient has coronary artery disease and recently saw cards.  He had an echo that look quite good.  He has no cardiovascular symptoms.    The patient has a history of prostate cancer for which he is no longer on treatment.  According to the patient as last PSA was undetectable although I do not have those results.  He would like to follow-up here for that.    The patient has hypertension.  He has not taken his metoprolol in 6 months and his blood pressure is fine.  He wants to get labs done for his cholesterol and other issues today.    A 10 point review of systems is otherwise negative.    Past Medical History:   Diagnosis Date     Anxiety      Ascending aorta dilatation (H)      Asthma     Dr. Lennox     COPD (chronic obstructive pulmonary disease) (H)     bronchiectasis on multiple inhalers      Coronary artery disease     Cath 3/14/16- 95% ostail RCA stenosis>>CECILIO placed, fu nuclear est 5/17 nl     Essential hypertension, benign     off medications with life style     GERD (gastroesophageal reflux disease)      Groin hematoma     right- 3/2016 post angio     Hoarseness      Hyperglycemia      Hyperlipidemia LDL goal <70      Lung nodule 2012    indeterminate-3 mm-needs 12 month follow up; fu done 3/16 at [facility] and benign, no fu needed     Obesity, unspecified       Osteoarthrosis, unspecified whether generalized or localized, unspecified site      Prostate CA (H) 2012    metastatic-guerita's score 7, seeds and xrt, now seeing Dr. Gentile     Urinary retention 2016    due to asa     Past Surgical History:   Procedure Laterality Date     C NONSPECIFIC PROCEDURE  7/01    Rt knee arthroscopy and menisectomy     C NONSPECIFIC PROCEDURE  2000    RT HERNIA REPAIR     CATARACT IOL, RT/LT  2016     HEART CATH LEFT HEART CATH  3/14/16    95% ostail RCA stenosis>>CECILIO placed     INSERT RADIATION SEEDS PROSTATE  7/10/2012    seeds and xrt     ROTATOR CUFF REPAIR RT/LT  2016     Social History     Social History     Marital status:      Spouse name: N/A     Number of children: 4     Years of education: N/A     Occupational History     sold insurance      Social History Main Topics     Smoking status: Former Smoker     Packs/day: 1.00     Years: 20.00     Types: Cigarettes     Quit date: 1/26/1994     Smokeless tobacco: Never Used     Alcohol use 0.0 oz/week     0 Standard drinks or equivalent per week      Comment: occ beer     Drug use: No     Sexual activity: Yes     Partners: Female     Other Topics Concern     Caffeine Concern No     occ      Sleep Concern Yes     due to wife passing away a month ago     Stress Concern No     Weight Concern No     Special Diet No     Exercise No     not currently, starting cardiac rehab soon      Seat Belt Yes     Social History Narrative     Current Outpatient Prescriptions   Medication Sig Dispense Refill     ASPIRIN NOT PRESCRIBED (INTENTIONAL) Antiplatelet medication not prescribed intentionally due to Allergy 0 each 0     atorvastatin (LIPITOR) 40 MG tablet Take 1 tablet (40 mg) by mouth At Bedtime 90 tablet 3     cetirizine (ZYRTEC) 10 MG tablet Take 1 tablet (10 mg) by mouth every evening 30 tablet 1     cinnamon 500 MG CAPS Take 1 capsule by mouth daily       fluticasone (FLONASE) 50 MCG/ACT spray Spray 1-2 sprays into both nostrils daily  "1 Bottle 3     guaiFENesin (MUCINEX) 600 MG 12 hr tablet Take 1 tablet (600 mg) by mouth 2 times daily 20 tablet 0     MULTIVITAMIN TABS   OR 2 tabs daily       nitroglycerin (NITROSTAT) 0.4 MG SL tablet Place 1 tablet (0.4 mg) under the tongue every 5 minutes as needed for chest pain if you are still having symptoms after 3 doses (15 minutes) call 911. 25 tablet 1     PROAIR  (90 Base) MCG/ACT inhaler INHALE 2 PUFFS INTO THE LUNGS EVERY 4 HOURS AS NEEDED FOR SHORTNESS OF BREATH OR DIFFICULT BREATHING OR WHEEZING 8.5 g 1     ticagrelor (BRILINTA) 90 MG tablet Take 1 tablet (90 mg) by mouth 2 times daily 180 tablet 3     Turmeric 500 MG TABS Take 2 tablets by mouth daily       fluticasone (FLOVENT DISKUS) 100 MCG/BLIST AEPB Inhale 1 puff into the lungs 2 times daily 3 Inhaler 3     Allergies   Allergen Reactions     Cefprozil Diarrhea     Aspirin      Urinary retention     Plavix [Clopidogrel] Itching     FAMILY HISTORY NOTED AND REVIEWED    REVIEW OF SYSTEMS: above    PHYSICAL EXAM    /77 (BP Location: Left arm, Patient Position: Chair, Cuff Size: Adult Large)  Pulse 78  Temp 97.6  F (36.4  C) (Oral)  Ht 5' 10\" (1.778 m)  Wt 220 lb 3.2 oz (99.9 kg)  SpO2 96%  BMI 31.6 kg/m2    Patient appears non toxic  Mouth - tongue midline and within normal limits, mucous membranes and posterior pharynx within normal limits, no lesions seen.  Neck - no masses, lesions or tenderness  Nodes - no supraclavicular, cervical or axially adenopathy .  Lungs - clear, normal flow  Cardiovascular - regular rate and rhythm, no murmer, rub or gallop, no jvp or edema, carotids within normal limits, no bruits.  Abdomen - normal active bowel sounds, soft, non tender, no masses, guarding or rebound, no hepatosplenomegaly      Labs sent    ASSESSMENT:  1. Copd/asthma, stable, he is to use the flovent bid and the albuterol prn, call if changes  2. Ascvd, stable  3. asc aortic dil, minimal  4. Hypertension, controlled, no need for " metop  5. Cap, follow up psa  6. Elevated cholesterol, follow up labs  7. Elevated sugar, follow up labs  8. hcm    PLAN:  shingrix at pharm  Exercise and diet  meds as noted above  Labs today      Zac Martínez M.D.

## 2018-07-16 NOTE — PATIENT INSTRUCTIONS
You do not need to use the metoprolol.    You should use the flovent inhaler one puff twice daily every day. You can use the albuterol inhaler but just if needed, meaning for shortness of breath or worsening cough.    I would recommend getting the new shingles shot called shingrix, but I would do it at your pharmacy as they can check with the insurance company to see if it is paid for.    Zac Martínez M.D.

## 2018-07-17 ASSESSMENT — ASTHMA QUESTIONNAIRES: ACT_TOTALSCORE: 18

## 2018-08-09 ENCOUNTER — TELEPHONE (OUTPATIENT)
Dept: FAMILY MEDICINE | Facility: CLINIC | Age: 78
End: 2018-08-09

## 2018-08-09 NOTE — TELEPHONE ENCOUNTER
Reason for Call: Request for an order or referral:    Order or referral being requested: Cortisone injection, knee    Date needed: as soon as possible    Has the patient been seen by the PCP for this problem? NO    Additional comments: Patient used to get his cortisone injections through his knee surgeon, they no longer accept his insurance.     Phone number Patient can be reached at:  Home number on file [phone] (home)    Best Time:  Anytime     Can we leave a detailed message on this number?  YES    Call taken on 8/9/2018 at 9:26 AM by Elaina Perez

## 2018-08-09 NOTE — TELEPHONE ENCOUNTER
[facility] to notify [facility] and [facility].  Message sent.  Angela Patterson RN

## 2018-08-16 DIAGNOSIS — R31.9 HEMATURIA: Primary | ICD-10-CM

## 2018-08-20 ENCOUNTER — OFFICE VISIT (OUTPATIENT)
Dept: CARDIOLOGY | Facility: CLINIC | Age: 78
End: 2018-08-20
Attending: INTERNAL MEDICINE
Payer: COMMERCIAL

## 2018-08-20 VITALS
WEIGHT: 223 LBS | HEIGHT: 71 IN | HEART RATE: 72 BPM | DIASTOLIC BLOOD PRESSURE: 73 MMHG | SYSTOLIC BLOOD PRESSURE: 113 MMHG | BODY MASS INDEX: 31.22 KG/M2

## 2018-08-20 DIAGNOSIS — J44.9 CHRONIC OBSTRUCTIVE PULMONARY DISEASE, UNSPECIFIED COPD TYPE (H): ICD-10-CM

## 2018-08-20 DIAGNOSIS — I25.118 CORONARY ARTERY DISEASE INVOLVING NATIVE CORONARY ARTERY OF NATIVE HEART WITH OTHER FORM OF ANGINA PECTORIS (H): ICD-10-CM

## 2018-08-20 DIAGNOSIS — R94.39 ABNORMAL CARDIOVASCULAR STRESS TEST: ICD-10-CM

## 2018-08-20 DIAGNOSIS — I10 ESSENTIAL HYPERTENSION WITH GOAL BLOOD PRESSURE LESS THAN 140/90: ICD-10-CM

## 2018-08-20 DIAGNOSIS — R10.13 EPIGASTRIC DISCOMFORT: ICD-10-CM

## 2018-08-20 DIAGNOSIS — I10 ESSENTIAL HYPERTENSION, BENIGN: ICD-10-CM

## 2018-08-20 DIAGNOSIS — J47.9 BRONCHIECTASIS WITHOUT COMPLICATION (H): ICD-10-CM

## 2018-08-20 DIAGNOSIS — E78.00 PURE HYPERCHOLESTEROLEMIA: ICD-10-CM

## 2018-08-20 LAB
ANION GAP SERPL CALCULATED.3IONS-SCNC: 13.5 MMOL/L (ref 6–17)
BUN SERPL-MCNC: 16 MG/DL (ref 7–30)
CALCIUM SERPL-MCNC: 9.3 MG/DL (ref 8.5–10.5)
CHLORIDE SERPL-SCNC: 108 MMOL/L (ref 98–107)
CO2 SERPL-SCNC: 24 MMOL/L (ref 23–29)
CREAT SERPL-MCNC: 1.04 MG/DL (ref 0.7–1.3)
GFR SERPL CREATININE-BSD FRML MDRD: 69 ML/MIN/1.7M2
GLUCOSE SERPL-MCNC: 118 MG/DL (ref 70–105)
POTASSIUM SERPL-SCNC: 4.5 MMOL/L (ref 3.5–5.1)
SODIUM SERPL-SCNC: 141 MMOL/L (ref 136–145)

## 2018-08-20 PROCEDURE — 99214 OFFICE O/P EST MOD 30 MIN: CPT | Performed by: NURSE PRACTITIONER

## 2018-08-20 PROCEDURE — 36415 COLL VENOUS BLD VENIPUNCTURE: CPT | Performed by: INTERNAL MEDICINE

## 2018-08-20 PROCEDURE — 80048 BASIC METABOLIC PNL TOTAL CA: CPT | Performed by: INTERNAL MEDICINE

## 2018-08-20 NOTE — LETTER
8/20/2018    Zac Martínez MD  [address]  [place] [address]    RE: Romel Bonilla       Dear Colleague,    I had the pleasure of seeing Romel Bonilla in the [facility].    Cardiology Clinic Progress Note  Romel Bonilla MRN# [id number]   YOB: 1940 Age: 77 year old     Reason for visit: Three month follow up           Assessment and Plan:     1. Coronary artery disease    Status post rotablation and PCI with stent placement to a 99% stenosis of the ostial RCA in March of 2016    Currently on Brilinta due to aspirin and Plavix allergy    Establish care with Dr. Miller in the event of Dr. Mathew's assisted in May 2018    2. Hypercholesterolemia    Most recent fasting lipids were from July 2018 showing total cholesterol 166, HDL 63 LDL 82 and triglycerides 107    Continue atorvastatin 40 mg daily    3. Hypertension    Well-controlled and not currently on medical therapy    4. Mild aortic insufficiency and borderline dilatation of the ascending aorta    Hypertensive management    Repeat echocardiogram in May 2019         History of Presenting Illness:    Romel Bonilla is a very pleasant 77 year old patient of Dr. Mathew who presents today for a three-month follow-up visit.  He has a past medical history significant for coronary artery disease status post rotablation and stenting to the ostial RCA in March of 2016, chronic bronchitis/COPD with  previous tobacco abuse (quit in 1994), hypertension, hyperlipidemia and osteoarthritis.    His most recent echocardiogram in May 2018 showed normal LVEF estimated at 55-60% with mild aortic insufficiency.  There is no significant wall motion abnormalities or other valvular abnormalities.  His most recent nuclear scan was in May 2017 that demonstrated no evidence dents of myocardial ischemia and normal wall motion with a calculated LVEF of 54%.    Today in clinic he presents with his agueda wife Edna.   "They winter in [place] and will be leaving in in September or October.  He denies any chest pain, shortness of breath, PND, orthopnea, palpitations or lower extremity edema.  Discussed potential sleep apnea evaluation, but at this time he does not feel that he has apneic episodes at night.                 Review of Systems:   Review of Systems:  Skin:  Negative     Eyes:  Negative    ENT:  Positive for nasal congestion;sinus trouble  Respiratory:  Negative    Cardiovascular:  Negative    Gastroenterology: Negative    Genitourinary:  not assessed    Musculoskeletal:  Negative    Neurologic:  Negative    Psychiatric:  Negative    Heme/Lymph/Imm:  Positive for allergies  Endocrine:  Negative                Physical Exam:     Vitals: /73  Pulse 72  Ht 1.803 m (5' 11\")  Wt 101.2 kg (223 lb)  BMI 31.1 kg/m2  Constitutional:  cooperative, alert and oriented, well developed, well nourished, in no acute distress overweight      Skin:  warm and dry to the touch, no apparent skin lesions or masses noted        Head:  normocephalic, no masses or lesions        Eyes:  pupils equal and round, conjunctivae and lids unremarkable, sclera white, no xanthalasma, EOMS intact, no nystagmus        ENT:  no pallor or cyanosis, dentition good        Neck:  carotid pulses are full and equal bilaterally;no carotid bruit        Chest:  clear to auscultation;normal symmetry        Cardiac: regular rhythm;normal S1 and S2   S4   systolic murmur;LUSB;grade 1          Abdomen:  not assessed this visit;abdomen soft        Extremities and Back:  no edema   venous insufficiency    Neurological:  no gross motor deficits               Medications:     Current Outpatient Prescriptions   Medication Sig Dispense Refill     atorvastatin (LIPITOR) 40 MG tablet Take 1 tablet (40 mg) by mouth At Bedtime 90 tablet 3     cetirizine (ZYRTEC) 10 MG tablet Take 1 tablet (10 mg) by mouth every evening 30 tablet 1     cinnamon 500 MG CAPS Take 1 capsule " by mouth daily       fluticasone (FLONASE) 50 MCG/ACT spray Spray 1-2 sprays into both nostrils daily 1 Bottle 3     fluticasone (FLOVENT DISKUS) 100 MCG/BLIST AEPB Inhale 1 puff into the lungs 2 times daily 3 Inhaler 3     guaiFENesin (MUCINEX) 600 MG 12 hr tablet Take 1 tablet (600 mg) by mouth 2 times daily 20 tablet 0     MULTIVITAMIN TABS   OR 2 tabs daily       nitroglycerin (NITROSTAT) 0.4 MG SL tablet Place 1 tablet (0.4 mg) under the tongue every 5 minutes as needed for chest pain if you are still having symptoms after 3 doses (15 minutes) call 911. 25 tablet 1     PROAIR  (90 Base) MCG/ACT inhaler INHALE 2 PUFFS INTO THE LUNGS EVERY 4 HOURS AS NEEDED FOR SHORTNESS OF BREATH OR DIFFICULT BREATHING OR WHEEZING 8.5 g 1     ticagrelor (BRILINTA) 90 MG tablet Take 1 tablet (90 mg) by mouth 2 times daily 180 tablet 3     Turmeric 500 MG TABS Take 2 tablets by mouth daily       ASPIRIN NOT PRESCRIBED (INTENTIONAL) Antiplatelet medication not prescribed intentionally due to Allergy 0 each 0           Family History   Problem Relation Age of Onset     Myocardial Infarction Mother      Other - See Comments Mother      Angina     Myocardial Infarction Father      Arthritis Father      Family History Negative Sister      Lupus Sister      Obesity Sister      Cancer Brother      Family History Negative Son      Family History Negative Son      Family History Negative Son      Family History Negative Son      Unknown/Adopted Maternal Grandmother      Myocardial Infarction Maternal Grandfather      40s or 50s     Unknown/Adopted Paternal Grandmother      Unknown/Adopted Paternal Grandfather        Social History     Social History     Marital status:      Spouse name: N/A     Number of children: 4     Years of education: N/A     Occupational History     sold insurance      Social History Main Topics     Smoking status: Former Smoker     Packs/day: 1.00     Years: 20.00     Types: Cigarettes     Quit date:  1/26/1994     Smokeless tobacco: Never Used     Alcohol use 0.0 oz/week     0 Standard drinks or equivalent per week      Comment: occ beer     Drug use: No     Sexual activity: Yes     Partners: Female     Other Topics Concern     Caffeine Concern No     occ      Sleep Concern Yes     due to wife passing away a month ago     Stress Concern No     Weight Concern No     Special Diet No     Exercise No     not currently, starting cardiac rehab soon      Seat Belt Yes     Social History Narrative            Past Medical History:     Past Medical History:   Diagnosis Date     Anxiety      Ascending aorta dilatation (H)      Asthma     Dr. Lennox     COPD (chronic obstructive pulmonary disease) (H)     bronchiectasis on multiple inhalers      Coronary artery disease     Cath 3/14/16- 95% ostail RCA stenosis>>CECILIO placed, fu nuclear est 5/17 nl     Essential hypertension, benign     off medications with life style     GERD (gastroesophageal reflux disease)      Groin hematoma     right- 3/2016 post angio     Hoarseness      Hyperglycemia      Hyperlipidemia LDL goal <70      Lung nodule 2012    indeterminate-3 mm-needs 12 month follow up; fu done 3/16 at [facility] and benign, no fu needed     Obesity, unspecified      Osteoarthrosis, unspecified whether generalized or localized, unspecified site      Prostate CA (H) 2012    metastatic-guerita's score 7, seeds and xrt, now seeing Dr. Gentile     Urinary retention 2016    due to asa              Past Surgical History:     Past Surgical History:   Procedure Laterality Date     C NONSPECIFIC PROCEDURE  7/01    Rt knee arthroscopy and menisectomy     C NONSPECIFIC PROCEDURE  2000    RT HERNIA REPAIR     CATARACT IOL, RT/LT  2016     HEART CATH LEFT HEART CATH  3/14/16    95% ostail RCA stenosis>>CECILIO placed     INSERT RADIATION SEEDS PROSTATE  7/10/2012    seeds and xrt     ROTATOR CUFF REPAIR RT/LT  2016              Allergies:   Cefprozil; Aspirin; and Plavix [clopidogrel]        Data:   All laboratory data reviewed:    LAST CHOLESTEROL:  Lab Results   Component Value Date    CHOL 166 07/16/2018     Lab Results   Component Value Date    HDL 63 07/16/2018     Lab Results   Component Value Date    LDL 82 07/16/2018     Lab Results   Component Value Date    TRIG 107 07/16/2018     Lab Results   Component Value Date    CHOLHDLRATIO 2.7 03/25/2013       LAST BMP:  Lab Results   Component Value Date     08/20/2018      Lab Results   Component Value Date    POTASSIUM 4.5 08/20/2018     Lab Results   Component Value Date    CHLORIDE 108 08/20/2018     Lab Results   Component Value Date    NNAMDI 9.3 08/20/2018     Lab Results   Component Value Date    CO2 24 08/20/2018     Lab Results   Component Value Date    BUN 16 08/20/2018     Lab Results   Component Value Date    CR 1.04 08/20/2018     Lab Results   Component Value Date     08/20/2018       LAST CBC:  Lab Results   Component Value Date    WBC 5.7 07/16/2018     Lab Results   Component Value Date    RBC 4.32 07/16/2018     Lab Results   Component Value Date    HGB 13.8 07/16/2018     Lab Results   Component Value Date    HCT 40.8 07/16/2018     Lab Results   Component Value Date    MCV 94 07/16/2018     Lab Results   Component Value Date    MCH 31.9 07/16/2018     Lab Results   Component Value Date    MCHC 33.8 07/16/2018     Lab Results   Component Value Date    RDW 13.3 07/16/2018     Lab Results   Component Value Date     07/16/2018       Thank you for allowing me to participate in the care of your patient.    Sincerely,     FELY FINCH [facility]

## 2018-08-20 NOTE — MR AVS SNAPSHOT
After Visit Summary   8/20/2018    Romel Bonilla    MRN: [id number]           Patient Information     Date Of Birth          1940        Visit Information        Provider Department      8/20/2018 10:00 AM Luh Curry APRN CNP [facility]        Today's Diagnoses     Essential hypertension with goal blood pressure less than 140/90        Coronary artery disease involving native coronary artery of native heart with other form of angina pectoris (H)        Epigastric discomfort        Bronchiectasis without complication (H)        Pure hypercholesterolemia        Abnormal cardiovascular stress test        HYPERTENSION        Chronic obstructive pulmonary disease, unspecified COPD type (H)          Care Instructions    Today's Recommendations    1. Continue other medications without changes.  2. Please follow up with Dr. Miller in echocardiogram and labs in May of 2019.    Please send a [employer] message or call [phone] with questions or concerns.     Scheduling number [phone].            Follow-ups after your visit        Your next 10 appointments already scheduled     Aug 21, 2018  9:30 AM CDT   New Patient Visit with Cony Montes De Oca PA-C   [facility] ([facility])    [address]  [address]  [facility] [place] [address] [phone]              Who to contact     If you have questions or need follow up information about today's clinic visit or your schedule please contact [facility] directly at [phone].  Normal or non-critical lab and imaging results will be communicated to you by MyChart, letter or phone within 4 business days after the clinic has received the results. If you do not hear from us within 7 days, please contact the clinic through [employer] or phone. If you have a critical or abnormal lab result, we will notify you by phone as soon as  "possible.  Submit refill requests through [employer] or call your pharmacy and they will forward the refill request to us. Please allow 3 business days for your refill to be completed.          Additional Information About Your Visit        [email or web address] Information     [employer] gives you secure access to your electronic health record. If you see a primary care provider, you can also send messages to your care team and make appointments. If you have questions, please call your primary care clinic.  If you do not have a primary care provider, please call [phone] and they will assist you.        Care EveryWhere ID     This is your Care EveryWhere ID. This could be used by other organizations to access your [place] medical records  [id number]        Your Vitals Were     Pulse Height BMI (Body Mass Index)             72 1.803 m (5' 11\") 31.1 kg/m2          Blood Pressure from Last 3 Encounters:   08/20/18 113/73   07/16/18 126/77   06/26/18 127/80    Weight from Last 3 Encounters:   08/20/18 101.2 kg (223 lb)   07/16/18 99.9 kg (220 lb 3.2 oz)   06/26/18 99.3 kg (219 lb)              We Performed the Following     Follow-Up with Cardiac Advanced Practice Provider        Primary Care Provider Office Phone # Fax #    Zac Martínez [phone] [address] [facility] [address]  [facility] [address]        Equal Access to Services     [facility]: Hadii aad ku hadasho Soomaali, waaxda luqadaha, qaybta kaalmada adeegyada, blanca correia . So [facility] [phone].    ATENCIÓN: Si habla español, tiene a deshpande disposición servicios gratuitos de asistencia lingüística. Tracie al [phone].    We comply with applicable federal civil rights laws and [place] laws. We do not discriminate on the basis of race, color, national origin, age, disability, sex, sexual orientation, or gender identity.            Thank you!     Thank you for choosing [facility]   [place]  " for your care. Our goal is always to provide you with excellent care. Hearing back from our patients is one way we can continue to improve our services. Please take a few minutes to complete the written survey that you may receive in the mail after your visit with us. Thank you!             Your Updated Medication List - Protect others around you: Learn how to safely use, store and throw away your medicines at www.disposemymeds.org.          This list is accurate as of 8/20/18 10:16 AM.  Always use your most recent med list.                   Brand Name Dispense Instructions for use Diagnosis    ASPIRIN NOT PRESCRIBED    INTENTIONAL    0 each    Antiplatelet medication not prescribed intentionally due to Allergy    ASCVD (arteriosclerotic cardiovascular disease)       atorvastatin 40 MG tablet    LIPITOR    90 tablet    Take 1 tablet (40 mg) by mouth At Bedtime    Coronary artery disease involving native coronary artery of native heart with other form of angina pectoris (H), Essential hypertension, benign       cetirizine 10 MG tablet    zyrTEC    30 tablet    Take 1 tablet (10 mg) by mouth every evening        cinnamon 500 MG Caps      Take 1 capsule by mouth daily        fluticasone 100 MCG/BLIST Aepb    FLOVENT DISKUS    3 Inhaler    Inhale 1 puff into the lungs 2 times daily    Mild persistent asthma without complication       fluticasone 50 MCG/ACT spray    FLONASE    1 Bottle    Spray 1-2 sprays into both nostrils daily    Raspy voice, Congestion of paranasal sinus       MUCINEX 600 MG 12 hr tablet   Generic drug:  guaiFENesin     20 tablet    Take 1 tablet (600 mg) by mouth 2 times daily        MULTIVITAMIN TABS   OR      2 tabs daily    Elevated prostate specific antigen (PSA), Mixed hyperlipidemia       nitroGLYcerin 0.4 MG sublingual tablet    NITROSTAT    25 tablet    Place 1 tablet (0.4 mg) under the tongue every 5 minutes as needed for chest pain if you are still having symptoms after 3 doses (15  minutes) call 911.    Postsurgical percutaneous transluminal coronary angioplasty status, Coronary artery disease involving native coronary artery of native heart with angina pectoris (H), Status post coronary angioplasty       PROAIR  (90 Base) MCG/ACT inhaler   Generic drug:  albuterol     8.5 g    INHALE 2 PUFFS INTO THE LUNGS EVERY 4 HOURS AS NEEDED FOR SHORTNESS OF BREATH OR DIFFICULT BREATHING OR WHEEZING    Chronic obstructive pulmonary disease, unspecified COPD type (H)       ticagrelor 90 MG tablet    BRILINTA    180 tablet    Take 1 tablet (90 mg) by mouth 2 times daily    Essential hypertension with goal blood pressure less than 140/90, Coronary artery disease involving native coronary artery of native heart with other form of angina pectoris (H)       Turmeric 500 MG Tabs      Take 2 tablets by mouth daily

## 2018-08-20 NOTE — PROGRESS NOTES
Cardiology Clinic Progress Note  Romel Bonilla MRN# [id number]   YOB: 1940 Age: 77 year old     Reason for visit: Three month follow up           Assessment and Plan:     1. Coronary artery disease    Status post rotablation and PCI with stent placement to a 99% stenosis of the ostial RCA in March of 2016    Currently on Brilinta due to aspirin and Plavix allergy    Establish care with Dr. Miller in the event of Dr. Mathew's snf in May 2018    2. Hypercholesterolemia    Most recent fasting lipids were from July 2018 showing total cholesterol 166, HDL 63 LDL 82 and triglycerides 107    Continue atorvastatin 40 mg daily    3. Hypertension    Well-controlled and not currently on medical therapy    4. Mild aortic insufficiency and borderline dilatation of the ascending aorta    Hypertensive management    Repeat echocardiogram in May 2019         History of Presenting Illness:    Romel Bonilla is a very pleasant 77 year old patient of Dr. Mathew who presents today for a three-month follow-up visit.  He has a past medical history significant for coronary artery disease status post rotablation and stenting to the ostial RCA in March of 2016, chronic bronchitis/COPD with  previous tobacco abuse (quit in 1994), hypertension, hyperlipidemia and osteoarthritis.    His most recent echocardiogram in May 2018 showed normal LVEF estimated at 55-60% with mild aortic insufficiency.  There is no significant wall motion abnormalities or other valvular abnormalities.  His most recent nuclear scan was in May 2017 that demonstrated no evidence dents of myocardial ischemia and normal wall motion with a calculated LVEF of 54%.    Today in clinic he presents with his agueda wife Edna.  They winter in [place] and will be leaving in in September or October.  He denies any chest pain, shortness of breath, PND, orthopnea, palpitations or lower extremity edema.  Discussed potential sleep apnea evaluation, but at  "this time he does not feel that he has apneic episodes at night.                 Review of Systems:   Review of Systems:  Skin:  Negative     Eyes:  Negative    ENT:  Positive for nasal congestion;sinus trouble  Respiratory:  Negative    Cardiovascular:  Negative    Gastroenterology: Negative    Genitourinary:  not assessed    Musculoskeletal:  Negative    Neurologic:  Negative    Psychiatric:  Negative    Heme/Lymph/Imm:  Positive for allergies  Endocrine:  Negative                Physical Exam:     Vitals: /73  Pulse 72  Ht 1.803 m (5' 11\")  Wt 101.2 kg (223 lb)  BMI 31.1 kg/m2  Constitutional:  cooperative, alert and oriented, well developed, well nourished, in no acute distress overweight      Skin:  warm and dry to the touch, no apparent skin lesions or masses noted        Head:  normocephalic, no masses or lesions        Eyes:  pupils equal and round, conjunctivae and lids unremarkable, sclera white, no xanthalasma, EOMS intact, no nystagmus        ENT:  no pallor or cyanosis, dentition good        Neck:  carotid pulses are full and equal bilaterally;no carotid bruit        Chest:  clear to auscultation;normal symmetry        Cardiac: regular rhythm;normal S1 and S2   S4   systolic murmur;LUSB;grade 1          Abdomen:  not assessed this visit;abdomen soft        Extremities and Back:  no edema   venous insufficiency    Neurological:  no gross motor deficits               Medications:     Current Outpatient Prescriptions   Medication Sig Dispense Refill     atorvastatin (LIPITOR) 40 MG tablet Take 1 tablet (40 mg) by mouth At Bedtime 90 tablet 3     cetirizine (ZYRTEC) 10 MG tablet Take 1 tablet (10 mg) by mouth every evening 30 tablet 1     cinnamon 500 MG CAPS Take 1 capsule by mouth daily       fluticasone (FLONASE) 50 MCG/ACT spray Spray 1-2 sprays into both nostrils daily 1 Bottle 3     fluticasone (FLOVENT DISKUS) 100 MCG/BLIST AEPB Inhale 1 puff into the lungs 2 times daily 3 Inhaler 3     " guaiFENesin (MUCINEX) 600 MG 12 hr tablet Take 1 tablet (600 mg) by mouth 2 times daily 20 tablet 0     MULTIVITAMIN TABS   OR 2 tabs daily       nitroglycerin (NITROSTAT) 0.4 MG SL tablet Place 1 tablet (0.4 mg) under the tongue every 5 minutes as needed for chest pain if you are still having symptoms after 3 doses (15 minutes) call 911. 25 tablet 1     PROAIR  (90 Base) MCG/ACT inhaler INHALE 2 PUFFS INTO THE LUNGS EVERY 4 HOURS AS NEEDED FOR SHORTNESS OF BREATH OR DIFFICULT BREATHING OR WHEEZING 8.5 g 1     ticagrelor (BRILINTA) 90 MG tablet Take 1 tablet (90 mg) by mouth 2 times daily 180 tablet 3     Turmeric 500 MG TABS Take 2 tablets by mouth daily       ASPIRIN NOT PRESCRIBED (INTENTIONAL) Antiplatelet medication not prescribed intentionally due to Allergy 0 each 0           Family History   Problem Relation Age of Onset     Myocardial Infarction Mother      Other - See Comments Mother      Angina     Myocardial Infarction Father      Arthritis Father      Family History Negative Sister      Lupus Sister      Obesity Sister      Cancer Brother      Family History Negative Son      Family History Negative Son      Family History Negative Son      Family History Negative Son      Unknown/Adopted Maternal Grandmother      Myocardial Infarction Maternal Grandfather      40s or 50s     Unknown/Adopted Paternal Grandmother      Unknown/Adopted Paternal Grandfather        Social History     Social History     Marital status:      Spouse name: N/A     Number of children: 4     Years of education: N/A     Occupational History     sold insurance      Social History Main Topics     Smoking status: Former Smoker     Packs/day: 1.00     Years: 20.00     Types: Cigarettes     Quit date: 1/26/1994     Smokeless tobacco: Never Used     Alcohol use 0.0 oz/week     0 Standard drinks or equivalent per week      Comment: occ beer     Drug use: No     Sexual activity: Yes     Partners: Female     Other Topics  Concern     Caffeine Concern No     occ      Sleep Concern Yes     due to wife passing away a month ago     Stress Concern No     Weight Concern No     Special Diet No     Exercise No     not currently, starting cardiac rehab soon      Seat Belt Yes     Social History Narrative            Past Medical History:     Past Medical History:   Diagnosis Date     Anxiety      Ascending aorta dilatation (H)      Asthma     Dr. Lennox     COPD (chronic obstructive pulmonary disease) (H)     bronchiectasis on multiple inhalers      Coronary artery disease     Cath 3/14/16- 95% ostail RCA stenosis>>CECILIO placed, fu nuclear est 5/17 nl     Essential hypertension, benign     off medications with life style     GERD (gastroesophageal reflux disease)      Groin hematoma     right- 3/2016 post angio     Hoarseness      Hyperglycemia      Hyperlipidemia LDL goal <70      Lung nodule 2012    indeterminate-3 mm-needs 12 month follow up; fu done 3/16 at [facility] and benign, no fu needed     Obesity, unspecified      Osteoarthrosis, unspecified whether generalized or localized, unspecified site      Prostate CA (H) 2012    metastatic-guerita's score 7, seeds and xrt, now seeing Dr. Gentile     Urinary retention 2016    due to asa              Past Surgical History:     Past Surgical History:   Procedure Laterality Date     C NONSPECIFIC PROCEDURE  7/01    Rt knee arthroscopy and menisectomy     C NONSPECIFIC PROCEDURE  2000    RT HERNIA REPAIR     CATARACT IOL, RT/LT  2016     HEART CATH LEFT HEART CATH  3/14/16    95% ostail RCA stenosis>>CECILIO placed     INSERT RADIATION SEEDS PROSTATE  7/10/2012    seeds and xrt     ROTATOR CUFF REPAIR RT/LT  2016              Allergies:   Cefprozil; Aspirin; and Plavix [clopidogrel]       Data:   All laboratory data reviewed:    LAST CHOLESTEROL:  Lab Results   Component Value Date    CHOL 166 07/16/2018     Lab Results   Component Value Date    HDL 63 07/16/2018     Lab Results   Component Value Date     LDL 82 07/16/2018     Lab Results   Component Value Date    TRIG 107 07/16/2018     Lab Results   Component Value Date    CHOLHDLRATIO 2.7 03/25/2013       LAST BMP:  Lab Results   Component Value Date     08/20/2018      Lab Results   Component Value Date    POTASSIUM 4.5 08/20/2018     Lab Results   Component Value Date    CHLORIDE 108 08/20/2018     Lab Results   Component Value Date    NNAMDI 9.3 08/20/2018     Lab Results   Component Value Date    CO2 24 08/20/2018     Lab Results   Component Value Date    BUN 16 08/20/2018     Lab Results   Component Value Date    CR 1.04 08/20/2018     Lab Results   Component Value Date     08/20/2018       LAST CBC:  Lab Results   Component Value Date    WBC 5.7 07/16/2018     Lab Results   Component Value Date    RBC 4.32 07/16/2018     Lab Results   Component Value Date    HGB 13.8 07/16/2018     Lab Results   Component Value Date    HCT 40.8 07/16/2018     Lab Results   Component Value Date    MCV 94 07/16/2018     Lab Results   Component Value Date    MCH 31.9 07/16/2018     Lab Results   Component Value Date    MCHC 33.8 07/16/2018     Lab Results   Component Value Date    RDW 13.3 07/16/2018     Lab Results   Component Value Date     07/16/2018         FELY FINCH, CNP

## 2018-08-20 NOTE — PATIENT INSTRUCTIONS
Today's Recommendations    1. Continue other medications without changes.  2. Please follow up with Dr. Miller in echocardiogram and labs in May of 2019.    Please send a [facility] message or call [phone] with questions or concerns.     Scheduling number [phone].

## 2018-08-21 ENCOUNTER — OFFICE VISIT (OUTPATIENT)
Dept: UROLOGY | Facility: CLINIC | Age: 78
End: 2018-08-21
Payer: COMMERCIAL

## 2018-08-21 VITALS
BODY MASS INDEX: 29.4 KG/M2 | DIASTOLIC BLOOD PRESSURE: 74 MMHG | OXYGEN SATURATION: 95 % | WEIGHT: 210 LBS | HEART RATE: 87 BPM | HEIGHT: 71 IN | SYSTOLIC BLOOD PRESSURE: 112 MMHG

## 2018-08-21 DIAGNOSIS — R31.0 GROSS HEMATURIA: ICD-10-CM

## 2018-08-21 LAB
ALBUMIN UR-MCNC: NEGATIVE MG/DL
APPEARANCE UR: CLEAR
BILIRUB UR QL STRIP: NEGATIVE
COLOR UR AUTO: YELLOW
GLUCOSE UR STRIP-MCNC: NEGATIVE MG/DL
HGB UR QL STRIP: NEGATIVE
KETONES UR STRIP-MCNC: NEGATIVE MG/DL
LEUKOCYTE ESTERASE UR QL STRIP: NEGATIVE
NITRATE UR QL: NEGATIVE
PH UR STRIP: 5.5 PH (ref 5–7)
SOURCE: NORMAL
SP GR UR STRIP: 1.02 (ref 1–1.03)
UROBILINOGEN UR STRIP-ACNC: 0.2 EU/DL (ref 0.2–1)

## 2018-08-21 PROCEDURE — 99204 OFFICE O/P NEW MOD 45 MIN: CPT | Performed by: PHYSICIAN ASSISTANT

## 2018-08-21 PROCEDURE — 88112 CYTOPATH CELL ENHANCE TECH: CPT | Performed by: PHYSICIAN ASSISTANT

## 2018-08-21 PROCEDURE — 88112 CYTOPATH CELL ENHANCE TECH: CPT | Mod: 26 | Performed by: PHYSICIAN ASSISTANT

## 2018-08-21 PROCEDURE — 81003 URINALYSIS AUTO W/O SCOPE: CPT | Performed by: PHYSICIAN ASSISTANT

## 2018-08-21 RX ORDER — PREDNISONE 20 MG/1
TABLET ORAL
Refills: 0 | Status: ON HOLD | COMMUNITY
Start: 2018-05-16 | End: 2018-09-19

## 2018-08-21 ASSESSMENT — PAIN SCALES - GENERAL: PAINLEVEL: NO PAIN (0)

## 2018-08-21 NOTE — NURSING NOTE
Chief Complaint   Patient presents with     Clinic Care Coordination - Follow-up     Pt here for hematuria     Magaly Wagner, SUJIT

## 2018-08-21 NOTE — MR AVS SNAPSHOT
After Visit Summary   8/21/2018    Romel Bonilla    MRN: [id number]           Patient Information     Date Of Birth          1940        Visit Information        Provider Department      8/21/2018 9:30 AM Cony Montes De Oca PA-C [facility] [place]        Today's Diagnoses     Gross hematuria          Care Instructions     -Urinalysis   - Urine cytology to look for abnormal cells.  - CT scan: Please call [phone] to schedule this at the [facility] ([place]) or [facility] ([place]).   - Cystoscopy with the  urologist to evaluate the interior of the bladder. Follow up as recommended by the urologist.            Follow-ups after your visit        Your next 10 appointments already scheduled     Sep 06, 2018  8:30 AM CDT   CT ABDOMEN PELVIS W/O & W CONTRAST with RSCCCT1   [facility] ([facility])    [address]  [place] [address] [phone]           Please bring any scans or X-rays taken at other hospitals, if similar tests were done. Also bring a list of your medicines, including vitamins, minerals and over-the-counter drugs. It is safest to leave personal items at home.  Be sure to tell your doctor:   If you have any allergies.   If there s any chance you are pregnant.   If you are breastfeeding.  How to prepare:   Do not eat or drink for 2 hours before your exam. If you need to take medicine, you may take it with small sips of water. (We may ask you to take liquid medicine as well.)   Please wear loose clothing, such as a sweat suit or jogging clothes. Avoid snaps, zippers and other metal. We may ask you to undress and put on a hospital gown.  Please arrive 30 minutes early for your CT. Once in the department you might be asked to drink water 15-20 minutes prior to your exam.  If indicated you may be asked to drink an oral contrast in  advance of your CT.  If this is the case, the imaging team will let you know or be in contact with you prior to your appointment  Patients over 70 or patients with diabetes or kidney problems:   If you haven t had a blood test (creatinine test) within the last 30 days, the Cardiologist/Radiologist may require you to get this test prior to your exam.  If you have diabetes:   Continue to take your metformin medication on the day of your exam  If you have any questions, please call the Imaging Department where you will have your exam.            Sep 06, 2018  9:30 AM CDT   Cystoscopy with Ti Gentile MD, UB CYF   [facility] [place] ([facility] [place])    [address]  [address]  [facility] [address] [phone]              Future tests that were ordered for you today     Open Future Orders        Priority Expected Expires Ordered    CT Abdomen Pelvis w/o & w Contrast [IIW233] Routine  8/21/2019 8/21/2018            Who to contact     If you have questions or need follow up information about today's clinic visit or your schedule please contact [facility] [place] directly at [phone].  Normal or non-critical lab and imaging results will be communicated to you by MyChart, letter or phone within 4 business days after the clinic has received the results. If you do not hear from us within 7 days, please contact the clinic through MyChart or phone. If you have a critical or abnormal lab result, we will notify you by phone as soon as possible.  Submit refill requests through [employer] or call your pharmacy and they will forward the refill request to us. Please allow 3 business days for your refill to be completed.          Additional Information About Your Visit        [employer] Information     [employer] gives you secure access to your electronic health record. If you see a primary care provider, you can also send messages to  "your care team and make appointments. If you have questions, please call your primary care clinic.  If you do not have a primary care provider, please call [phone] and they will assist you.        Care EveryWhere ID     This is your Care EveryWhere ID. This could be used by other organizations to access your [facility] medical records  [id number]        Your Vitals Were     Pulse Height Pulse Oximetry BMI (Body Mass Index)          87 1.803 m (5' 11\") 95% 29.29 kg/m2         Blood Pressure from Last 3 Encounters:   08/21/18 112/74   08/20/18 113/73   07/16/18 126/77    Weight from Last 3 Encounters:   08/21/18 95.3 kg (210 lb)   08/20/18 101.2 kg (223 lb)   07/16/18 99.9 kg (220 lb 3.2 oz)              We Performed the Following     Cytology non gyn     UA without Microscopic        Primary Care Provider Office Phone # Fax #    Zac Martínez [phone] [address] [address]        Equal Access to Services     [facility]: Hadii aad ku hadasho Solaisha, waaxda luqadaha, qaybta kaalmadarwin patterson, blanca correia . So [facility] [phone].    ATENCIÓN: Si habla español, tiene a deshpande disposición servicios gratuitos de asistencia lingüística. [facility] [phone].    We comply with applicable federal civil rights laws and [place] laws. We do not discriminate on the basis of race, color, national origin, age, disability, sex, sexual orientation, or gender identity.            Thank you!     Thank you for choosing [facility] [place]  for your care. Our goal is always to provide you with excellent care. Hearing back from our patients is one way we can continue to improve our services. Please take a few minutes to complete the written survey that you may receive in the mail after your visit with us. Thank you!             Your Updated Medication List - Protect others around you: Learn how to safely use, store " and throw away your medicines at www.disposemymeds.org.          This list is accurate as of 8/21/18 11:41 AM.  Always use your most recent med list.                   Brand Name Dispense Instructions for use Diagnosis    ASPIRIN NOT PRESCRIBED    INTENTIONAL    0 each    Antiplatelet medication not prescribed intentionally due to Allergy    ASCVD (arteriosclerotic cardiovascular disease)       atorvastatin 40 MG tablet    LIPITOR    90 tablet    Take 1 tablet (40 mg) by mouth At Bedtime    Coronary artery disease involving native coronary artery of native heart with other form of angina pectoris (H), Essential hypertension, benign       cetirizine 10 MG tablet    zyrTEC    30 tablet    Take 1 tablet (10 mg) by mouth every evening        cinnamon 500 MG Caps      Take 1 capsule by mouth daily        fluticasone 100 MCG/BLIST Aepb    FLOVENT DISKUS    3 Inhaler    Inhale 1 puff into the lungs 2 times daily    Mild persistent asthma without complication       fluticasone 50 MCG/ACT spray    FLONASE    1 Bottle    Spray 1-2 sprays into both nostrils daily    Raspy voice, Congestion of paranasal sinus       MUCINEX 600 MG 12 hr tablet   Generic drug:  guaiFENesin     20 tablet    Take 1 tablet (600 mg) by mouth 2 times daily        MULTIVITAMIN TABS   OR      2 tabs daily    Elevated prostate specific antigen (PSA), Mixed hyperlipidemia       nitroGLYcerin 0.4 MG sublingual tablet    NITROSTAT    25 tablet    Place 1 tablet (0.4 mg) under the tongue every 5 minutes as needed for chest pain if you are still having symptoms after 3 doses (15 minutes) call 911.    Postsurgical percutaneous transluminal coronary angioplasty status, Coronary artery disease involving native coronary artery of native heart with angina pectoris (H), Status post coronary angioplasty       predniSONE 20 MG tablet    DELTASONE          PROAIR  (90 Base) MCG/ACT inhaler   Generic drug:  albuterol     8.5 g    INHALE 2 PUFFS INTO THE LUNGS  EVERY 4 HOURS AS NEEDED FOR SHORTNESS OF BREATH OR DIFFICULT BREATHING OR WHEEZING    Chronic obstructive pulmonary disease, unspecified COPD type (H)       ticagrelor 90 MG tablet    BRILINTA    180 tablet    Take 1 tablet (90 mg) by mouth 2 times daily    Essential hypertension with goal blood pressure less than 140/90, Coronary artery disease involving native coronary artery of native heart with other form of angina pectoris (H)       Turmeric 500 MG Tabs      Take 2 tablets by mouth daily

## 2018-08-21 NOTE — LETTER
"8/21/2018       RE: Romel Bonilla  [address]  [facility] [address]     Dear Colleague,    Thank you for referring your patient, Romel Bonilla, to the [facility] [place] at [facility]. Please see a copy of my visit note below.    CC: Hematuria.    HPI: It is a pleasure to see . Romel Bonilla, a 77 year old male seen today in the urology clinic in consultation from self referral for evaluation of ongoing gross hematuria. This was first detected several months ago.  Notes blood and \"a membrane of tissue\" in the urine every few weeks. Hx cardiac stenting in 2016, on Brilinta. Had an episode of clot retention following this (and was the last time he saw Dr. Gentile). No cysto or imaging at that time.     History of Aretha Score 7 prostate cancer in ~2012, s/p combo radiation therapy. Voids without difficulty, and reports nocturia of 1-3.  He currently denies any dysuria, pyuria, hesitancy, intermittency, feelings of incomplete emptying, or any recent history of urinary tract infections or stones.    PSA up to date: yes <0.01    Hematuria Risk Factors:  Age >40: Yes   Smoking history: quit 1994  Occupational exposure to chemicals or dyes (ie, benzenes, aromatic amines): no  History of urologic disorder or disease: no  History of irritative voiding symptoms: no  History of urinary tract infection: no  Analgesic abuse: no  History of pelvic irradiation: no    Past Medical History:   Diagnosis Date     Anxiety      Ascending aorta dilatation (H)      Asthma     Dr. Lennox     COPD (chronic obstructive pulmonary disease) (H)     bronchiectasis on multiple inhalers      Coronary artery disease     Cath 3/14/16- 95% ostail RCA stenosis>>CECILIO placed, fu nuclear est 5/17 nl     Essential hypertension, benign     off medications with life style     GERD (gastroesophageal reflux disease)      Groin hematoma     right- 3/2016 post angio     " Hoarseness      Hyperglycemia      Hyperlipidemia LDL goal <70      Lung nodule 2012    indeterminate-3 mm-needs 12 month follow up; fu done 3/16 at [facility] and benign, no fu needed     Obesity, unspecified      Osteoarthrosis, unspecified whether generalized or localized, unspecified site      Prostate CA (H) 2012    metastatic-guerita's score 7, seeds and xrt, now seeing Dr. Gentile     Urinary retention 2016    due to asa     Past Surgical History:   Procedure Laterality Date     C NONSPECIFIC PROCEDURE  7/01    Rt knee arthroscopy and menisectomy     C NONSPECIFIC PROCEDURE  2000    RT HERNIA REPAIR     CATARACT IOL, RT/LT  2016     CYSTOSCOPY       HEART CATH LEFT HEART CATH  3/14/16    95% ostail RCA stenosis>>CECILIO placed     INSERT RADIATION SEEDS PROSTATE  7/10/2012    seeds and xrt     PROSTATE SURGERY       ROTATOR CUFF REPAIR RT/LT  2016     Current Outpatient Prescriptions   Medication Sig Dispense Refill     atorvastatin (LIPITOR) 40 MG tablet Take 1 tablet (40 mg) by mouth At Bedtime 90 tablet 3     cetirizine (ZYRTEC) 10 MG tablet Take 1 tablet (10 mg) by mouth every evening 30 tablet 1     cinnamon 500 MG CAPS Take 1 capsule by mouth daily       fluticasone (FLOVENT DISKUS) 100 MCG/BLIST AEPB Inhale 1 puff into the lungs 2 times daily 3 Inhaler 3     MULTIVITAMIN TABS   OR 2 tabs daily       ticagrelor (BRILINTA) 90 MG tablet Take 1 tablet (90 mg) by mouth 2 times daily 180 tablet 3     Turmeric 500 MG TABS Take 2 tablets by mouth daily       ASPIRIN NOT PRESCRIBED (INTENTIONAL) Antiplatelet medication not prescribed intentionally due to Allergy (Patient not taking: Reported on 8/21/2018) 0 each 0     fluticasone (FLONASE) 50 MCG/ACT spray Spray 1-2 sprays into both nostrils daily (Patient not taking: Reported on 8/21/2018) 1 Bottle 3     guaiFENesin (MUCINEX) 600 MG 12 hr tablet Take 1 tablet (600 mg) by mouth 2 times daily 20 tablet 0     nitroglycerin (NITROSTAT) 0.4 MG SL tablet Place 1 tablet  "(0.4 mg) under the tongue every 5 minutes as needed for chest pain if you are still having symptoms after 3 doses (15 minutes) call 911. (Patient not taking: Reported on 8/21/2018) 25 tablet 1     predniSONE (DELTASONE) 20 MG tablet   0     PROAIR  (90 Base) MCG/ACT inhaler INHALE 2 PUFFS INTO THE LUNGS EVERY 4 HOURS AS NEEDED FOR SHORTNESS OF BREATH OR DIFFICULT BREATHING OR WHEEZING (Patient not taking: Reported on 8/21/2018) 8.5 g 1     Allergies   Allergen Reactions     Cefprozil Diarrhea     Aspirin      Urinary retention     Plavix [Clopidogrel] Itching     FAMILY HISTORY: There is no reported history of genitourinary carcinoma.  There is no history of urolithiasis.      Social History     Social History     Marital status:      Spouse name: N/A     Number of children: 4     Years of education: N/A     Occupational History     sold insurance      Social History Main Topics     Smoking status: Former Smoker     Packs/day: 1.00     Years: 20.00     Types: Cigarettes     Quit date: 1/26/1994     Smokeless tobacco: Never Used     Alcohol use 0.0 oz/week     0 Standard drinks or equivalent per week      Comment: occ beer     Drug use: No     Sexual activity: Yes     Partners: Female     Other Topics Concern     Caffeine Concern No     occ      Sleep Concern Yes     due to wife passing away a month ago     Stress Concern No     Weight Concern No     Special Diet No     Exercise No     not currently, starting cardiac rehab soon      Seat Belt Yes     Social History Narrative         ROS: A comprehensive 14 point ROS was obtained and  otherwise negative except for that outlined above in the HPI.    PHYSICAL EXAM:   Vitals:    08/21/18 0937   BP: 112/74   BP Location: Left arm   Patient Position: Sitting   Cuff Size: Adult Regular   Pulse: 87   SpO2: 95%   Weight: 95.3 kg (210 lb)   Height: 1.803 m (5' 11\")     GENERAL: Well groomed, well developed, well nourished male in NAD.  HEENT: AT, NC, EOMI " bilaterally.  SKIN: Warm to touch, dry.  No visible rashes or lesions on examined areas.  RESP: No increased respiratory effort.  MS: Full ROM in extremities.  : Deferred for now.  RADHA: Deferred for now.  NEURO: Alert and oriented x 3.  PSYCH: Normal mood and affect, pleasant and agreeable during interview and exam.    LABS: UA neg  7/16/18 PSA <0.01    IMAGING: None    ASSESSMENT and PLAN:    Mr. Romel Bonilla is a pleasant 77 year old male with gross hematuria, on Brilinta, hx prostate ca and RT.     At this time, recommend proceeding with comprehensive hematuria evaluation to include:  - Urinalysis and micro.   - Urine cytology/FISH to look for cells concerning for malignancy.  - CT urogram for upper tract imaging.  - Cystoscopy with Dr. Gentile  -PSA <0.01        30 minutes were spent with the patient today, > 50% in counseling and coordination of care of hematuria.    Again, thank you for allowing me to participate in the care of your patient.      Sincerely,    Cony Montes De Oca PA-C, JUAN PABLO

## 2018-08-21 NOTE — PROGRESS NOTES
"CC: Hematuria.    HPI: It is a pleasure to see . Romel Bonilla, a 77 year old male seen today in the urology clinic in consultation from self referral for evaluation of ongoing gross hematuria. This was first detected several months ago.  Notes blood and \"a membrane of tissue\" in the urine every few weeks. Hx cardiac stenting in 2016, on Brilinta. Had an episode of clot retention following this (and was the last time he saw Dr. Gentile). No cysto or imaging at that time.     History of Aretha Score 7 prostate cancer in ~2012, s/p combo radiation therapy. Voids without difficulty, and reports nocturia of 1-3.  He currently denies any dysuria, pyuria, hesitancy, intermittency, feelings of incomplete emptying, or any recent history of urinary tract infections or stones.    PSA up to date: yes <0.01    Hematuria Risk Factors:  Age >40: Yes   Smoking history: quit 1994  Occupational exposure to chemicals or dyes (ie, benzenes, aromatic amines): no  History of urologic disorder or disease: no  History of irritative voiding symptoms: no  History of urinary tract infection: no  Analgesic abuse: no  History of pelvic irradiation: no    Past Medical History:   Diagnosis Date     Anxiety      Ascending aorta dilatation (H)      Asthma     Dr. Lennox     COPD (chronic obstructive pulmonary disease) (H)     bronchiectasis on multiple inhalers      Coronary artery disease     Cath 3/14/16- 95% ostail RCA stenosis>>CECILIO placed, fu nuclear est 5/17 nl     Essential hypertension, benign     off medications with life style     GERD (gastroesophageal reflux disease)      Groin hematoma     right- 3/2016 post angio     Hoarseness      Hyperglycemia      Hyperlipidemia LDL goal <70      Lung nodule 2012    indeterminate-3 mm-needs 12 month follow up; fu done 3/16 at [facility] and benign, no fu needed     Obesity, unspecified      Osteoarthrosis, unspecified whether generalized or localized, unspecified site      Prostate CA (H) " 2012    metastatic-guerita's score 7, seeds and xrt, now seeing Dr. Gentile     Urinary retention 2016    due to asa     Past Surgical History:   Procedure Laterality Date     C NONSPECIFIC PROCEDURE  7/01    Rt knee arthroscopy and menisectomy     C NONSPECIFIC PROCEDURE  2000    RT HERNIA REPAIR     CATARACT IOL, RT/LT  2016     CYSTOSCOPY       HEART CATH LEFT HEART CATH  3/14/16    95% ostail RCA stenosis>>CECILIO placed     INSERT RADIATION SEEDS PROSTATE  7/10/2012    seeds and xrt     PROSTATE SURGERY       ROTATOR CUFF REPAIR RT/LT  2016     Current Outpatient Prescriptions   Medication Sig Dispense Refill     atorvastatin (LIPITOR) 40 MG tablet Take 1 tablet (40 mg) by mouth At Bedtime 90 tablet 3     cetirizine (ZYRTEC) 10 MG tablet Take 1 tablet (10 mg) by mouth every evening 30 tablet 1     cinnamon 500 MG CAPS Take 1 capsule by mouth daily       fluticasone (FLOVENT DISKUS) 100 MCG/BLIST AEPB Inhale 1 puff into the lungs 2 times daily 3 Inhaler 3     MULTIVITAMIN TABS   OR 2 tabs daily       ticagrelor (BRILINTA) 90 MG tablet Take 1 tablet (90 mg) by mouth 2 times daily 180 tablet 3     Turmeric 500 MG TABS Take 2 tablets by mouth daily       ASPIRIN NOT PRESCRIBED (INTENTIONAL) Antiplatelet medication not prescribed intentionally due to Allergy (Patient not taking: Reported on 8/21/2018) 0 each 0     fluticasone (FLONASE) 50 MCG/ACT spray Spray 1-2 sprays into both nostrils daily (Patient not taking: Reported on 8/21/2018) 1 Bottle 3     guaiFENesin (MUCINEX) 600 MG 12 hr tablet Take 1 tablet (600 mg) by mouth 2 times daily 20 tablet 0     nitroglycerin (NITROSTAT) 0.4 MG SL tablet Place 1 tablet (0.4 mg) under the tongue every 5 minutes as needed for chest pain if you are still having symptoms after 3 doses (15 minutes) call 911. (Patient not taking: Reported on 8/21/2018) 25 tablet 1     predniSONE (DELTASONE) 20 MG tablet   0     PROAIR  (90 Base) MCG/ACT inhaler INHALE 2 PUFFS INTO THE LUNGS  "EVERY 4 HOURS AS NEEDED FOR SHORTNESS OF BREATH OR DIFFICULT BREATHING OR WHEEZING (Patient not taking: Reported on 8/21/2018) 8.5 g 1     Allergies   Allergen Reactions     Cefprozil Diarrhea     Aspirin      Urinary retention     Plavix [Clopidogrel] Itching     FAMILY HISTORY: There is no reported history of genitourinary carcinoma.  There is no history of urolithiasis.      Social History     Social History     Marital status:      Spouse name: N/A     Number of children: 4     Years of education: N/A     Occupational History     sold insurance      Social History Main Topics     Smoking status: Former Smoker     Packs/day: 1.00     Years: 20.00     Types: Cigarettes     Quit date: 1/26/1994     Smokeless tobacco: Never Used     Alcohol use 0.0 oz/week     0 Standard drinks or equivalent per week      Comment: occ beer     Drug use: No     Sexual activity: Yes     Partners: Female     Other Topics Concern     Caffeine Concern No     occ      Sleep Concern Yes     due to wife passing away a month ago     Stress Concern No     Weight Concern No     Special Diet No     Exercise No     not currently, starting cardiac rehab soon      Seat Belt Yes     Social History Narrative         ROS: A comprehensive 14 point ROS was obtained and  otherwise negative except for that outlined above in the HPI.    PHYSICAL EXAM:   Vitals:    08/21/18 0937   BP: 112/74   BP Location: Left arm   Patient Position: Sitting   Cuff Size: Adult Regular   Pulse: 87   SpO2: 95%   Weight: 95.3 kg (210 lb)   Height: 1.803 m (5' 11\")     GENERAL: Well groomed, well developed, well nourished male in NAD.  HEENT: AT, NC, EOMI bilaterally.  SKIN: Warm to touch, dry.  No visible rashes or lesions on examined areas.  RESP: No increased respiratory effort.  MS: Full ROM in extremities.  : Deferred for now.  RADHA: Deferred for now.  NEURO: Alert and oriented x 3.  PSYCH: Normal mood and affect, pleasant and agreeable during interview and " exam.    LABS: UA neg  7/16/18 PSA <0.01    IMAGING: None    ASSESSMENT and PLAN:    Mr. Romel Bonilla is a pleasant 77 year old male with gross hematuria, on Brilinta, hx prostate ca and RT.     At this time, recommend proceeding with comprehensive hematuria evaluation to include:  - Urinalysis and micro.   - Urine cytology/FISH to look for cells concerning for malignancy.  - CT urogram for upper tract imaging.  - Cystoscopy with Dr. Gentile  -PSA <0.01        30 minutes were spent with the patient today, > 50% in counseling and coordination of care of hematuria.    Cony Montes De Oca PA-C  [facility]

## 2018-08-22 PROCEDURE — 88120 CYTP URNE 3-5 PROBES EA SPEC: CPT | Performed by: PHYSICIAN ASSISTANT

## 2018-08-29 LAB — COPATH REPORT: NORMAL

## 2018-08-30 LAB — COPATH REPORT: NORMAL

## 2018-09-06 ENCOUNTER — HOSPITAL ENCOUNTER (OUTPATIENT)
Dept: CT IMAGING | Facility: CLINIC | Age: 78
Discharge: HOME | End: 2018-09-06
Attending: PHYSICIAN ASSISTANT | Admitting: PHYSICIAN ASSISTANT
Payer: COMMERCIAL

## 2018-09-06 ENCOUNTER — OFFICE VISIT (OUTPATIENT)
Dept: UROLOGY | Facility: CLINIC | Age: 78
End: 2018-09-06
Payer: COMMERCIAL

## 2018-09-06 VITALS — HEIGHT: 71 IN | BODY MASS INDEX: 29.4 KG/M2 | WEIGHT: 210 LBS | OXYGEN SATURATION: 97 % | HEART RATE: 88 BPM

## 2018-09-06 DIAGNOSIS — R31.0 GROSS HEMATURIA: Primary | ICD-10-CM

## 2018-09-06 DIAGNOSIS — R31.0 GROSS HEMATURIA: ICD-10-CM

## 2018-09-06 LAB
ALBUMIN UR-MCNC: NEGATIVE MG/DL
APPEARANCE UR: CLEAR
BILIRUB UR QL STRIP: NEGATIVE
COLOR UR AUTO: YELLOW
GLUCOSE UR STRIP-MCNC: NEGATIVE MG/DL
HGB UR QL STRIP: NEGATIVE
KETONES UR STRIP-MCNC: NEGATIVE MG/DL
LEUKOCYTE ESTERASE UR QL STRIP: NEGATIVE
NITRATE UR QL: NEGATIVE
PH UR STRIP: 5.5 PH (ref 5–7)
SOURCE: NORMAL
SP GR UR STRIP: 1.01 (ref 1–1.03)
UROBILINOGEN UR STRIP-ACNC: 0.2 EU/DL (ref 0.2–1)

## 2018-09-06 PROCEDURE — 81003 URINALYSIS AUTO W/O SCOPE: CPT | Performed by: UROLOGY

## 2018-09-06 PROCEDURE — 25000128 H RX IP 250 OP 636: Performed by: PHYSICIAN ASSISTANT

## 2018-09-06 PROCEDURE — 52000 CYSTOURETHROSCOPY: CPT | Performed by: UROLOGY

## 2018-09-06 PROCEDURE — 74178 CT ABD&PLV WO CNTR FLWD CNTR: CPT

## 2018-09-06 RX ORDER — CIPROFLOXACIN 500 MG/1
500 TABLET, FILM COATED ORAL ONCE
Qty: 1 TABLET | Refills: 0 | Status: SHIPPED | OUTPATIENT
Start: 2018-09-06 | End: 2018-09-06

## 2018-09-06 RX ORDER — IOPAMIDOL 755 MG/ML
500 INJECTION, SOLUTION INTRAVASCULAR ONCE
Status: COMPLETED | OUTPATIENT
Start: 2018-09-06 | End: 2018-09-06

## 2018-09-06 RX ORDER — CEFAZOLIN SODIUM 1 G
1 VIAL (EA) INJECTION SEE ADMIN INSTRUCTIONS
Status: CANCELLED | OUTPATIENT
Start: 2018-09-06 | End: 2019-09-06

## 2018-09-06 RX ADMIN — SODIUM CHLORIDE 65 ML: 900 INJECTION, SOLUTION INTRAVENOUS at 08:40

## 2018-09-06 RX ADMIN — IOPAMIDOL 100 ML: 755 INJECTION, SOLUTION INTRAVENOUS at 08:40

## 2018-09-06 ASSESSMENT — PAIN SCALES - GENERAL: PAINLEVEL: NO PAIN (0)

## 2018-09-06 NOTE — MR AVS SNAPSHOT
After Visit Summary   9/6/2018    Romel Bonilla    MRN: [id number]           Patient Information     Date Of Birth          1940        Visit Information        Provider Department      9/6/2018 9:30 AM Ti Gentile MD; UB CYF [facility] [place]         Follow-ups after your visit        Your next 10 appointments already scheduled     Sep 06, 2018  8:30 AM CDT   CT ABDOMEN PELVIS W/O & W CONTRAST with [id number]   [facility] ([facility])    [address]  [facility] [id number] [phone]           Please bring any scans or X-rays taken at other hospitals, if similar tests were done. Also bring a list of your medicines, including vitamins, minerals and over-the-counter drugs. It is safest to leave personal items at home.  Be sure to tell your doctor:   If you have any allergies.   If there s any chance you are pregnant.   If you are breastfeeding.  How to prepare:   Do not eat or drink for 2 hours before your exam. If you need to take medicine, you may take it with small sips of water. (We may ask you to take liquid medicine as well.)   Please wear loose clothing, such as a sweat suit or jogging clothes. Avoid snaps, zippers and other metal. We may ask you to undress and put on a hospital gown.  Please arrive 30 minutes early for your CT. Once in the department you might be asked to drink water 15-20 minutes prior to your exam.  If indicated you may be asked to drink an oral contrast in advance of your CT.  If this is the case, the imaging team will let you know or be in contact with you prior to your appointment  Patients over 70 or patients with diabetes or kidney problems:   If you haven t had a blood test (creatinine test) within the last 30 days, the Cardiologist/Radiologist may require you to get this test prior to your exam.  If you have diabetes:   Continue to take your  metformin medication on the day of your exam  If you have any questions, please call the Imaging Department where you will have your exam.            Sep 06, 2018  9:30 AM CDT   Cystoscopy with Ti Gentile MD, UB CYF   [facility] [place] ([facility])    [address] [facility]  [address]  [facility] [id number] [phone]              Future tests that were ordered for you today     Open Future Orders        Priority Expected Expires Ordered    CT Abdomen Pelvis w/o & w Contrast [FNS976] Routine  8/21/2019 8/21/2018            Who to contact     If you have questions or need follow up information about today's clinic visit or your schedule please contact [facility] [place] directly at [phone].  Normal or non-critical lab and imaging results will be communicated to you by Kickit Withhart, letter or phone within 4 business days after the clinic has received the results. If you do not hear from us within 7 days, please contact the clinic through [employer] With[employer] or phone. If you have a critical or abnormal lab result, we will notify you by phone as soon as possible.  Submit refill requests through Integrated biometrics or call your pharmacy and they will forward the refill request to us. Please allow 3 business days for your refill to be completed.          Additional Information About Your Visit        Integrated biometrics Information     Integrated biometrics gives you secure access to your electronic health record. If you see a primary care provider, you can also send messages to your care team and make appointments. If you have questions, please call your primary care clinic.  If you do not have a primary care provider, please call [phone] and they will assist you.        Care EveryWhere ID     This is your Care EveryWhere ID. This could be used by other organizations to access your [facility] medical records  [id number]         Blood Pressure from Last 3  Encounters:   08/21/18 112/74   08/20/18 113/73   07/16/18 126/77    Weight from Last 3 Encounters:   08/21/18 95.3 kg (210 lb)   08/20/18 101.2 kg (223 lb)   07/16/18 99.9 kg (220 lb 3.2 oz)              Today, you had the following     No orders found for display       Primary Care Provider Office Phone # Fax #    Zac Martínez [phone] [address] [facility] [address]  [facility] [address]        Equal Access to Services     [facility]: Hadii aad ku hadasho Soomaali, waaxda luqadaha, qaybta kaalmada ademarcosyadarwin, blanca correia . So [facility] [phone].    ATENCIÓN: Si habla español, tiene a deshpande disposición servicios gratuitos de asistencia lingüística. [facility] [phone].    We comply with applicable federal civil rights laws and [place] laws. We do not discriminate on the basis of race, color, national origin, age, disability, sex, sexual orientation, or gender identity.            Thank you!     Thank you for choosing [facility] [place]  for your care. Our goal is always to provide you with excellent care. Hearing back from our patients is one way we can continue to improve our services. Please take a few minutes to complete the written survey that you may receive in the mail after your visit with us. Thank you!             Your Updated Medication List - Protect others around you: Learn how to safely use, store and throw away your medicines at www.disposemymeds.org.          This list is accurate as of 8/21/18 10:13 AM.  Always use your most recent med list.                   Brand Name Dispense Instructions for use Diagnosis    ASPIRIN NOT PRESCRIBED    INTENTIONAL    0 each    Antiplatelet medication not prescribed intentionally due to Allergy    ASCVD (arteriosclerotic cardiovascular disease)       atorvastatin 40 MG tablet    LIPITOR    90 tablet    Take 1 tablet (40 mg) by mouth At Bedtime    Coronary artery disease  involving native coronary artery of native heart with other form of angina pectoris (H), Essential hypertension, benign       cetirizine 10 MG tablet    zyrTEC    30 tablet    Take 1 tablet (10 mg) by mouth every evening        cinnamon 500 MG Caps      Take 1 capsule by mouth daily        fluticasone 100 MCG/BLIST Aepb    FLOVENT DISKUS    3 Inhaler    Inhale 1 puff into the lungs 2 times daily    Mild persistent asthma without complication       fluticasone 50 MCG/ACT spray    FLONASE    1 Bottle    Spray 1-2 sprays into both nostrils daily    Raspy voice, Congestion of paranasal sinus       MUCINEX 600 MG 12 hr tablet   Generic drug:  guaiFENesin     20 tablet    Take 1 tablet (600 mg) by mouth 2 times daily        MULTIVITAMIN TABS   OR      2 tabs daily    Elevated prostate specific antigen (PSA), Mixed hyperlipidemia       nitroGLYcerin 0.4 MG sublingual tablet    NITROSTAT    25 tablet    Place 1 tablet (0.4 mg) under the tongue every 5 minutes as needed for chest pain if you are still having symptoms after 3 doses (15 minutes) call 911.    Postsurgical percutaneous transluminal coronary angioplasty status, Coronary artery disease involving native coronary artery of native heart with angina pectoris (H), Status post coronary angioplasty       predniSONE 20 MG tablet    DELTASONE          PROAIR  (90 Base) MCG/ACT inhaler   Generic drug:  albuterol     8.5 g    INHALE 2 PUFFS INTO THE LUNGS EVERY 4 HOURS AS NEEDED FOR SHORTNESS OF BREATH OR DIFFICULT BREATHING OR WHEEZING    Chronic obstructive pulmonary disease, unspecified COPD type (H)       ticagrelor 90 MG tablet    BRILINTA    180 tablet    Take 1 tablet (90 mg) by mouth 2 times daily    Essential hypertension with goal blood pressure less than 140/90, Coronary artery disease involving native coronary artery of native heart with other form of angina pectoris (H)       Turmeric 500 MG Tabs      Take 2 tablets by mouth daily

## 2018-09-06 NOTE — NURSING NOTE
Prior to the start of the procedure and with procedural staff participation, I verbally confirmed the patient s identity using two indicators, relevant allergies, that the procedure was appropriate and matched the consent or emergent situation, and that the correct equipment/implants were available. Immediately prior to starting the procedure I conducted the Time Out with the procedural staff and re-confirmed the patient s name, procedure, and site/side. (The Joint Commission universal protocol was followed.)  Yes    Sedation (Moderate or Deep): None  Pt has signed the consent form stating that we will be doing a CYSTOSCOPY (with or without stent removal) today, and that it is the correct procedure. I verbally confirmed the patient s identity using two indicators, relevant allergies, and that the correct equipment was available. Post-op information given to the pt as needed at check-out. I have sent an appropriate antibiotic to the pharmacy in our building as recommended by the MD. ELLIE Gonzales CMA

## 2018-09-06 NOTE — PATIENT INSTRUCTIONS
"     AFTER YOUR CYSTOSCOPY         You have just completed a cystoscopy, or \"cysto\", which allowed your physician to learn more about your bladder (or to remove a stent placed after surgery). We suggest that you continue to avoid caffeine, fruit juice, and alcohol for the next 24 hours, however, you are encouraged to return to your normal activities.       A few things that are considered normal after your cystoscopy:    * small amount of bleeding (or spotting) that clears within the next 24 hours    * slight burning sensation with urination    * sensation to of needing to avoid more frequently    * the feeling of \"air\" in your urine    * mild discomfort that is relieved with Tylonol        Please contact our office promptly if you:    * develop a fever above 101 degrees    * are unable to urinate    * develop bright red blood that does not stop    * severe pain or swelling        And of course, please contact our office with any concerns or questions [phone]        "

## 2018-09-06 NOTE — LETTER
9/6/2018      RE: Romel Bonilla  [address]  [facility] [address]       Romel Bonilla is a 77-year-old male who recently had gross hematuria. He underwent combination radiation years ago for adenocarcinoma of the prostate and continues to have an undetectable PSA. He has on anticoagulants at home for heart disease. He is able to void comfortably.  CT scan by my review appears normal, urine cytology and fish tests are normal.  He returns for cystoscopy.  Other past medical history: Medications, allergies reviewed  Exam: Alert and oriented, normocephalic, normal respirations, neuro grossly intact. Normal external genitalia  Flexible cystoscopy-normal urethra, proximal bulbous stricture that I cannot pass  Assessment: Gross hematuria, urethral stricture, history of prostate cancer treated with radiation. Suspect his bleeding is from his anticoagulant and radiation changes  Plan: Stop Brilinta one week prior to procedure-will need Cornelius urethral dilation and video cystoscopy under anesthesia. Patient is leaving for [place] in 2-1/2 weeks for the season      Ti Gentile MD

## 2018-09-06 NOTE — LETTER
9/6/2018       RE: Romel Bonilla  [address]  [facility] [address]     Dear Colleague,    Thank you for referring your patient, Romel Bonilla, to the [facility] [place] [facility]. Please see a copy of my visit note below.    Romel Bonilla is a 77-year-old male who recently had gross hematuria. He underwent combination radiation years ago for adenocarcinoma of the prostate and continues to have an undetectable PSA. He has on anticoagulants at home for heart disease. He is able to void comfortably.  CT scan by my review appears normal, urine cytology and fish tests are normal.  He returns for cystoscopy.  Other past medical history: Medications, allergies reviewed  Exam: Alert and oriented, normocephalic, normal respirations, neuro grossly intact. Normal external genitalia  Flexible cystoscopy-normal urethra, proximal bulbous stricture that I cannot pass  Assessment: Gross hematuria, urethral stricture, history of prostate cancer treated with radiation. Suspect his bleeding is from his anticoagulant and radiation changes  Plan: Stop Brilinta one week prior to procedure-will need Cornelius urethral dilation and video cystoscopy under anesthesia. Patient is leaving for [place] in 2-1/2 weeks for the season    Again, thank you for allowing me to participate in the care of your patient.      Sincerely,    Ti Gentile MD

## 2018-09-06 NOTE — PROGRESS NOTES
Romel Bonilla is a 77-year-old male who recently had gross hematuria. He underwent combination radiation years ago for adenocarcinoma of the prostate and continues to have an undetectable PSA. He has on anticoagulants at home for heart disease. He is able to void comfortably.  CT scan by my review appears normal, urine cytology and fish tests are normal.  He returns for cystoscopy.  Other past medical history: Medications, allergies reviewed  Exam: Alert and oriented, normocephalic, normal respirations, neuro grossly intact. Normal external genitalia  Flexible cystoscopy-normal urethra, proximal bulbous stricture that I cannot pass  Assessment: Gross hematuria, urethral stricture, history of prostate cancer treated with radiation. Suspect his bleeding is from his anticoagulant and radiation changes  Plan: Stop Brilinta one week prior to procedure-will need Cornelius urethral dilation and video cystoscopy under anesthesia. Patient is leaving for [place] in 2-1/2 weeks for the season

## 2018-09-12 ENCOUNTER — TELEPHONE (OUTPATIENT)
Dept: CARDIOLOGY | Facility: CLINIC | Age: 78
End: 2018-09-12

## 2018-09-12 NOTE — TELEPHONE ENCOUNTER
Patient calling advising that he is have a cystoscopy procedure and they are requesting he hold his Brilinta for 7 days.     Patient is a former Dr. Mathew patient. Plans to establish with Dr. Miller. Patient on Brilinta for hx of PCI and stent of RCA (03/2016). RN will send to Dr. Miller for review and recommendation and call him back with response. Patient verbalized understanding and agreed with plan.     Last OV 8/20/18 with BEAR Luh Curry  1. Coronary artery disease    Status post rotablation and PCI with stent placement to a 99% stenosis of the ostial RCA in March of 2016    Currently on Brilinta due to aspirin and Plavix allergy    Establish care with Dr. Miller in the event of Dr. Mathew's alf in May 2018     2. Hypercholesterolemia    Most recent fasting lipids were from July 2018 showing total cholesterol 166, HDL 63 LDL 82 and triglycerides 107    Continue atorvastatin 40 mg daily     3. Hypertension    Well-controlled and not currently on medical therapy     4. Mild aortic insufficiency and borderline dilatation of the ascending aorta    Hypertensive management    Repeat echocardiogram in May 2019

## 2018-09-12 NOTE — TELEPHONE ENCOUNTER
RN called patient and advised him that Dr. Miller ok with 7 day hold of Brilinta for cystoscopy. Patient verbalized understanding. Patient had no further questions at this time.

## 2018-09-12 NOTE — TELEPHONE ENCOUNTER
Sure, can do a 7 day hold (cysto biopsies can be kind of bloody). Generally we would want them on a single antiplatelet like aspirin or Plavix, but okay to not initiate them given his history of intolerance.

## 2018-09-14 ENCOUNTER — OFFICE VISIT (OUTPATIENT)
Dept: FAMILY MEDICINE | Facility: CLINIC | Age: 78
End: 2018-09-14
Payer: COMMERCIAL

## 2018-09-14 VITALS
WEIGHT: 225 LBS | SYSTOLIC BLOOD PRESSURE: 139 MMHG | DIASTOLIC BLOOD PRESSURE: 76 MMHG | TEMPERATURE: 97.8 F | BODY MASS INDEX: 31.5 KG/M2 | HEIGHT: 71 IN | OXYGEN SATURATION: 97 %

## 2018-09-14 DIAGNOSIS — R31.0 GROSS HEMATURIA: ICD-10-CM

## 2018-09-14 DIAGNOSIS — Z01.818 PREOP GENERAL PHYSICAL EXAM: Primary | ICD-10-CM

## 2018-09-14 DIAGNOSIS — I25.10 CORONARY ARTERY DISEASE INVOLVING NATIVE CORONARY ARTERY OF NATIVE HEART WITHOUT ANGINA PECTORIS: ICD-10-CM

## 2018-09-14 DIAGNOSIS — I10 BENIGN ESSENTIAL HYPERTENSION: ICD-10-CM

## 2018-09-14 DIAGNOSIS — J44.9 CHRONIC OBSTRUCTIVE PULMONARY DISEASE, UNSPECIFIED COPD TYPE (H): ICD-10-CM

## 2018-09-14 PROCEDURE — 93010 ELECTROCARDIOGRAM REPORT: CPT | Performed by: INTERNAL MEDICINE

## 2018-09-14 PROCEDURE — 99215 OFFICE O/P EST HI 40 MIN: CPT | Mod: 25 | Performed by: INTERNAL MEDICINE

## 2018-09-14 PROCEDURE — 90662 IIV NO PRSV INCREASED AG IM: CPT | Performed by: INTERNAL MEDICINE

## 2018-09-14 PROCEDURE — G0008 ADMIN INFLUENZA VIRUS VAC: HCPCS | Performed by: INTERNAL MEDICINE

## 2018-09-14 NOTE — MR AVS SNAPSHOT
After Visit Summary   9/14/2018    Romel Bonilla    MRN: [id number]           Patient Information     Date Of Birth          1940        Visit Information        Provider Department      9/14/2018 8:30 AM Zac Martínez MD [facility]        Today's Diagnoses     Preop general physical exam    -  1    Gross hematuria        Coronary artery disease involving native coronary artery of native heart without angina pectoris        Chronic obstructive pulmonary disease, unspecified COPD type (H)        Benign essential hypertension          Care Instructions      Before Your Surgery      Call your surgeon if there is any change in your health. This includes signs of a cold or flu (such as a sore throat, runny nose, cough, rash or fever).    Do not smoke, drink alcohol or take over the counter medicine (unless your surgeon or primary care doctor tells you to) for the 24 hours before and after surgery.    If you take prescribed drugs: Follow your doctor s orders about which medicines to take and which to stop until after surgery.    Eating and drinking prior to surgery: follow the instructions from your surgeon    Take a shower or bath the night before surgery. Use the soap your surgeon gave you to gently clean your skin. If you do not have soap from your surgeon, use your regular soap. Do not shave or scrub the surgery site.  Wear clean pajamas and have clean sheets on your bed.           Follow-ups after your visit        Your next 10 appointments already scheduled     Sep 19, 2018   Procedure with Ti Gentile MD   [facility] (--)    [address]  [facility] [id number] [phone]              Who to contact     If you have questions or need follow up information about today's clinic visit or your schedule please contact [facility] directly at [phone].  Normal or non-critical lab and imaging results will be  "communicated to you by MyChart, letter or phone within 4 business days after the clinic has received the results. If you do not hear from us within 7 days, please contact the clinic through [employer] or phone. If you have a critical or abnormal lab result, we will notify you by phone as soon as possible.  Submit refill requests through [employer] or call your pharmacy and they will forward the refill request to us. Please allow 3 business days for your refill to be completed.          Additional Information About Your Visit        [employer] Information     [employer] gives you secure access to your electronic health record. If you see a primary care provider, you can also send messages to your care team and make appointments. If you have questions, please call your primary care clinic.  If you do not have a primary care provider, please call [phone] and they will assist you.        Care EveryWhere ID     This is your Care EveryWhere ID. This could be used by other organizations to access your [facility] medical records  [id number]        Your Vitals Were     Temperature Height Pulse Oximetry BMI (Body Mass Index)          97.8  F (36.6  C) (Oral) 5' 11\" (1.803 m) 97% 31.38 kg/m2         Blood Pressure from Last 3 Encounters:   09/14/18 139/76   08/21/18 112/74   08/20/18 113/73    Weight from Last 3 Encounters:   09/14/18 225 lb (102.1 kg)   09/06/18 210 lb (95.3 kg)   08/21/18 210 lb (95.3 kg)              We Performed the Following     EKG 12-LEAD CLINIC READ     HC FLU VACCINE, INCREASED ANTIGEN, PRESV FREE        Primary Care Provider Office Phone # Fax #    Zac Martínez [phone] [address]  [place] [address]        Equal Access to Services     EUN HURTADO AH: Hadii abiola Neri, waaxda luqadaha, qaybta kaalmada adeegyada, blanca graves. So [facility] [phone].    ATENCIÓN: Si habla español, tiene a deshpande disposición servicios gratuitos de " asistencia lingüística. Tracie al [phone].    We comply with applicable federal civil rights laws and [place] laws. We do not discriminate on the basis of race, color, national origin, age, disability, sex, sexual orientation, or gender identity.            Thank you!     Thank you for choosing [facility]  for your care. Our goal is always to provide you with excellent care. Hearing back from our patients is one way we can continue to improve our services. Please take a few minutes to complete the written survey that you may receive in the mail after your visit with us. Thank you!             Your Updated Medication List - Protect others around you: Learn how to safely use, store and throw away your medicines at www.disposemymeds.org.          This list is accurate as of 9/14/18 12:34 PM.  Always use your most recent med list.                   Brand Name Dispense Instructions for use Diagnosis    ASPIRIN NOT PRESCRIBED    INTENTIONAL    0 each    Antiplatelet medication not prescribed intentionally due to Allergy    ASCVD (arteriosclerotic cardiovascular disease)       atorvastatin 40 MG tablet    LIPITOR    90 tablet    Take 1 tablet (40 mg) by mouth At Bedtime    Coronary artery disease involving native coronary artery of native heart with other form of angina pectoris (H), Essential hypertension, benign       cetirizine 10 MG tablet    zyrTEC    30 tablet    Take 1 tablet (10 mg) by mouth every evening        cinnamon 500 MG Caps      Take 1 capsule by mouth daily        fluticasone 100 MCG/BLIST Aepb    FLOVENT DISKUS    3 Inhaler    Inhale 1 puff into the lungs 2 times daily    Mild persistent asthma without complication       fluticasone 50 MCG/ACT spray    FLONASE    1 Bottle    Spray 1-2 sprays into both nostrils daily    Raspy voice, Congestion of paranasal sinus       MUCINEX 600 MG 12 hr tablet   Generic drug:  guaiFENesin     20 tablet    Take 1 tablet (600 mg) by mouth 2 times daily         MULTIVITAMIN TABS   OR      2 tabs daily    Elevated prostate specific antigen (PSA), Mixed hyperlipidemia       nitroGLYcerin 0.4 MG sublingual tablet    NITROSTAT    25 tablet    Place 1 tablet (0.4 mg) under the tongue every 5 minutes as needed for chest pain if you are still having symptoms after 3 doses (15 minutes) call 911.    Postsurgical percutaneous transluminal coronary angioplasty status, Coronary artery disease involving native coronary artery of native heart with angina pectoris (H), Status post coronary angioplasty       predniSONE 20 MG tablet    DELTASONE          PROAIR  (90 Base) MCG/ACT inhaler   Generic drug:  albuterol     8.5 g    INHALE 2 PUFFS INTO THE LUNGS EVERY 4 HOURS AS NEEDED FOR SHORTNESS OF BREATH OR DIFFICULT BREATHING OR WHEEZING    Chronic obstructive pulmonary disease, unspecified COPD type (H)       ticagrelor 90 MG tablet    BRILINTA    180 tablet    Take 1 tablet (90 mg) by mouth 2 times daily    Essential hypertension with goal blood pressure less than 140/90, Coronary artery disease involving native coronary artery of native heart with other form of angina pectoris (H)       Turmeric 500 MG Tabs      Take 2 tablets by mouth daily

## 2018-09-14 NOTE — PROGRESS NOTES
[facility]  [address] [facility] [address]  [phone]  Dept: [phone]    PRE-OP EVALUATION:  Today's date: 2018    Romel Bonilla (: 1940) presents for pre-operative evaluation assessment as requested by Ti Sellers MD.  He requires evaluation and anesthesia risk assessment prior to undergoing surgery/procedure for treatment of COMBINED CYSTOSCOPY, DILATE URETHRA .    Proposed Surgery/ Procedure: VIDEO CYSTOSCOPY WITH GASCA DILATION   Date of Surgery/ Procedure: 18  Time of Surgery/ Procedure: 11 am  Hospital/Surgical Facility:  OR    Primary Physician: Zac Martínez  Type of Anesthesia Anticipated: General    Patient has a Health Care Directive or Living Will:  NO    The patient recently had gross hematuria.  A CT scan was negative.  He is now scheduled for cystoscopy under general anesthesia due to difficulty with a office cystoscopy.    He otherwise feels fine.  He has a history of coronary disease.  A follow-up nuclear stress test in May 2017 was normal.  A recent echo in May of this year showed a normal EF with mild aortic regurgitation but was otherwise normal.  He can walk without difficulty and has no chest pain.    The patient does have COPD for which he takes daily Flovent.  He does have a chronic cough but this is not worse.  No wheezing or significant shortness of breath.                Past Medical History:      Past Medical History:   Diagnosis Date     Anxiety      Ascending aorta dilatation (H)      Asthma     Dr. Lennox     COPD (chronic obstructive pulmonary disease) (H)     bronchiectasis on multiple inhalers      Coronary artery disease     Cath 3/14/16- 95% ostail RCA stenosis>>CECILIO placed, fu nuclear est  nl     Essential hypertension, benign     off medications with life style     GERD (gastroesophageal reflux disease)      Groin hematoma     right- 3/2016 post angio     Gross hematuria 2018    ct neg,   Krupa     Hoarseness      Hyperglycemia      Hyperlipidemia LDL goal <70      Lung nodule 2012    indeterminate-3 mm-needs 12 month follow up; fu done 3/16 at [facility] and benign, no fu needed     Obesity, unspecified      Osteoarthrosis, unspecified whether generalized or localized, unspecified site      Prostate CA (H) 2012    metastatic-guerita's score 7, seeds and xrt, now seeing Dr. Gentile     Urinary retention 2016    due to asa             Past Surgical History:      Past Surgical History:   Procedure Laterality Date     C NONSPECIFIC PROCEDURE  7/01    Rt knee arthroscopy and menisectomy     C NONSPECIFIC PROCEDURE  2000    RT HERNIA REPAIR     CATARACT IOL, RT/LT  2016     CYSTOSCOPY       HEART CATH LEFT HEART CATH  3/14/16    95% ostail RCA stenosis>>CECILIO placed     INSERT RADIATION SEEDS PROSTATE  7/10/2012    seeds and xrt     ROTATOR CUFF REPAIR RT/LT  2016             Social History:     Social History   Substance Use Topics     Smoking status: Former Smoker     Packs/day: 1.00     Years: 20.00     Types: Cigarettes     Quit date: 1/26/1994     Smokeless tobacco: Never Used     Alcohol use 0.0 oz/week     0 Standard drinks or equivalent per week      Comment: occ beer             Family History:   No family history of bleeding difficulty, anesthesia problems or blood clots.         Allergies:     Allergies   Allergen Reactions     Cefprozil Diarrhea     Aspirin      Urinary retention     Plavix [Clopidogrel] Itching             Medications:     Current Outpatient Prescriptions   Medication Sig Dispense Refill     atorvastatin (LIPITOR) 40 MG tablet Take 1 tablet (40 mg) by mouth At Bedtime 90 tablet 3     cetirizine (ZYRTEC) 10 MG tablet Take 1 tablet (10 mg) by mouth every evening 30 tablet 1     cinnamon 500 MG CAPS Take 1 capsule by mouth daily       fluticasone (FLONASE) 50 MCG/ACT spray Spray 1-2 sprays into both nostrils daily 1 Bottle 3     fluticasone (FLOVENT DISKUS) 100 MCG/BLIST AEPB Inhale  "1 puff into the lungs 2 times daily 3 Inhaler 3     guaiFENesin (MUCINEX) 600 MG 12 hr tablet Take 1 tablet (600 mg) by mouth 2 times daily 20 tablet 0     MULTIVITAMIN TABS   OR 2 tabs daily       predniSONE (DELTASONE) 20 MG tablet   0     PROAIR  (90 Base) MCG/ACT inhaler INHALE 2 PUFFS INTO THE LUNGS EVERY 4 HOURS AS NEEDED FOR SHORTNESS OF BREATH OR DIFFICULT BREATHING OR WHEEZING 8.5 g 1     ticagrelor (BRILINTA) 90 MG tablet Take 1 tablet (90 mg) by mouth 2 times daily 180 tablet 3     Turmeric 500 MG TABS Take 2 tablets by mouth daily       ASPIRIN NOT PRESCRIBED (INTENTIONAL) Antiplatelet medication not prescribed intentionally due to Allergy (Patient not taking: Reported on 8/21/2018) 0 each 0     nitroglycerin (NITROSTAT) 0.4 MG SL tablet Place 1 tablet (0.4 mg) under the tongue every 5 minutes as needed for chest pain if you are still having symptoms after 3 doses (15 minutes) call 911. (Patient not taking: Reported on 9/14/2018) 25 tablet 1               Review of Systems:   No history of bleeding difficulty, anesthesia problems or blood clots.  The 10 point Review of Systems is negative other than noted in the HPI           Physical Exam:   Blood pressure 139/76, temperature 97.8  F (36.6  C), temperature source Oral, height 5' 11\" (1.803 m), weight 225 lb (102.1 kg), SpO2 97 %.    Constitutional: healthy appearing, alert and in no distress  Heent: Normocephalic. Head without obvious masses or lesions. PERRLDC, EOMI. Mouth exam within normal limits: tongue, mucous membranes, posterior pharynx all normal, no lesions or abnormalities seen.  Tm's and canals within normal limits bilaterally. Neck supple, no nuchal rigidity or masses. No supraclavicular, or cervical adenopathy. Thyroid symmetric, no masses.  Cardiovascular: Regular rate and rhythm, no murmer, rub or gallops.  JVP not elevated, no edema.  Carotids within normal limits bilaterally, no bruits.  Respiratory: Normal respiratory effort.  " Occasionally inspir wheeze, no crackles  Gastrointestinal: Normal active bowel sounds.   Soft, not tender, no masses, guarding or rebound.  No hepatosplenomegaly.   Musculoskeletal: extremities normal, no gross deformities noted.  Skin: no suspicious lesions or rashes   Neurologic: Mental status within normal limits.  Speech fluent.  No gross motor abnormalities and gait intact.  Psychiatric: mentation appears normal and affect normal.         Data:   Labs recently done and noted; ekg - sinus rhythm with variability, first degree av block, pac, otherwise normal.        Assessment:   1. Normal pre op exam in patient with cad and copd both of which are stable.  I believe hs is low risk for this procedure  2. Gross hematuria  3. Cad, copd, stable  4. Hypertension, controlled         Plan:   The patient is ok for the procedure  He is off his brillenta for the procedure, resume it post op when ok with urology      Zac Martínez M.D.

## 2018-09-17 NOTE — H&P (VIEW-ONLY)
[facility]  [address] [address]  [phone]  Dept: [phone]    PRE-OP EVALUATION:  Today's date: 2018    Romel Bonilla (: 1940) presents for pre-operative evaluation assessment as requested by Ti Sellers MD.  He requires evaluation and anesthesia risk assessment prior to undergoing surgery/procedure for treatment of COMBINED CYSTOSCOPY, DILATE URETHRA .    Proposed Surgery/ Procedure: VIDEO CYSTOSCOPY WITH GASCA DILATION   Date of Surgery/ Procedure: 18  Time of Surgery/ Procedure: 11 am  Hospital/Surgical Facility:  OR    Primary Physician: Zac Martínez  Type of Anesthesia Anticipated: General    Patient has a Health Care Directive or Living Will:  NO    The patient recently had gross hematuria.  A CT scan was negative.  He is now scheduled for cystoscopy under general anesthesia due to difficulty with a office cystoscopy.    He otherwise feels fine.  He has a history of coronary disease.  A follow-up nuclear stress test in May 2017 was normal.  A recent echo in May of this year showed a normal EF with mild aortic regurgitation but was otherwise normal.  He can walk without difficulty and has no chest pain.    The patient does have COPD for which he takes daily Flovent.  He does have a chronic cough but this is not worse.  No wheezing or significant shortness of breath.                Past Medical History:      Past Medical History:   Diagnosis Date     Anxiety      Ascending aorta dilatation (H)      Asthma     Dr. Lennox     COPD (chronic obstructive pulmonary disease) (H)     bronchiectasis on multiple inhalers      Coronary artery disease     Cath 3/14/16- 95% ostail RCA stenosis>>CECILIO placed, fu nuclear est  nl     Essential hypertension, benign     off medications with life style     GERD (gastroesophageal reflux disease)      Groin hematoma     right- 3/2016 post angio     Gross hematuria 2018    ct neg,   Krupa     Hoarseness      Hyperglycemia      Hyperlipidemia LDL goal <70      Lung nodule 2012    indeterminate-3 mm-needs 12 month follow up; fu done 3/16 at [facility] and benign, no fu needed     Obesity, unspecified      Osteoarthrosis, unspecified whether generalized or localized, unspecified site      Prostate CA (H) 2012    metastatic-guerita's score 7, seeds and xrt, now seeing Dr. Gentile     Urinary retention 2016    due to asa             Past Surgical History:      Past Surgical History:   Procedure Laterality Date     C NONSPECIFIC PROCEDURE  7/01    Rt knee arthroscopy and menisectomy     C NONSPECIFIC PROCEDURE  2000    RT HERNIA REPAIR     CATARACT IOL, RT/LT  2016     CYSTOSCOPY       HEART CATH LEFT HEART CATH  3/14/16    95% ostail RCA stenosis>>CECILIO placed     INSERT RADIATION SEEDS PROSTATE  7/10/2012    seeds and xrt     ROTATOR CUFF REPAIR RT/LT  2016             Social History:     Social History   Substance Use Topics     Smoking status: Former Smoker     Packs/day: 1.00     Years: 20.00     Types: Cigarettes     Quit date: 1/26/1994     Smokeless tobacco: Never Used     Alcohol use 0.0 oz/week     0 Standard drinks or equivalent per week      Comment: occ beer             Family History:   No family history of bleeding difficulty, anesthesia problems or blood clots.         Allergies:     Allergies   Allergen Reactions     Cefprozil Diarrhea     Aspirin      Urinary retention     Plavix [Clopidogrel] Itching             Medications:     Current Outpatient Prescriptions   Medication Sig Dispense Refill     atorvastatin (LIPITOR) 40 MG tablet Take 1 tablet (40 mg) by mouth At Bedtime 90 tablet 3     cetirizine (ZYRTEC) 10 MG tablet Take 1 tablet (10 mg) by mouth every evening 30 tablet 1     cinnamon 500 MG CAPS Take 1 capsule by mouth daily       fluticasone (FLONASE) 50 MCG/ACT spray Spray 1-2 sprays into both nostrils daily 1 Bottle 3     fluticasone (FLOVENT DISKUS) 100 MCG/BLIST AEPB Inhale  "1 puff into the lungs 2 times daily 3 Inhaler 3     guaiFENesin (MUCINEX) 600 MG 12 hr tablet Take 1 tablet (600 mg) by mouth 2 times daily 20 tablet 0     MULTIVITAMIN TABS   OR 2 tabs daily       predniSONE (DELTASONE) 20 MG tablet   0     PROAIR  (90 Base) MCG/ACT inhaler INHALE 2 PUFFS INTO THE LUNGS EVERY 4 HOURS AS NEEDED FOR SHORTNESS OF BREATH OR DIFFICULT BREATHING OR WHEEZING 8.5 g 1     ticagrelor (BRILINTA) 90 MG tablet Take 1 tablet (90 mg) by mouth 2 times daily 180 tablet 3     Turmeric 500 MG TABS Take 2 tablets by mouth daily       ASPIRIN NOT PRESCRIBED (INTENTIONAL) Antiplatelet medication not prescribed intentionally due to Allergy (Patient not taking: Reported on 8/21/2018) 0 each 0     nitroglycerin (NITROSTAT) 0.4 MG SL tablet Place 1 tablet (0.4 mg) under the tongue every 5 minutes as needed for chest pain if you are still having symptoms after 3 doses (15 minutes) call 911. (Patient not taking: Reported on 9/14/2018) 25 tablet 1               Review of Systems:   No history of bleeding difficulty, anesthesia problems or blood clots.  The 10 point Review of Systems is negative other than noted in the HPI           Physical Exam:   Blood pressure 139/76, temperature 97.8  F (36.6  C), temperature source Oral, height 5' 11\" (1.803 m), weight 225 lb (102.1 kg), SpO2 97 %.    Constitutional: healthy appearing, alert and in no distress  Heent: Normocephalic. Head without obvious masses or lesions. PERRLDC, EOMI. Mouth exam within normal limits: tongue, mucous membranes, posterior pharynx all normal, no lesions or abnormalities seen.  Tm's and canals within normal limits bilaterally. Neck supple, no nuchal rigidity or masses. No supraclavicular, or cervical adenopathy. Thyroid symmetric, no masses.  Cardiovascular: Regular rate and rhythm, no murmer, rub or gallops.  JVP not elevated, no edema.  Carotids within normal limits bilaterally, no bruits.  Respiratory: Normal respiratory effort.  " Occasionally inspir wheeze, no crackles  Gastrointestinal: Normal active bowel sounds.   Soft, not tender, no masses, guarding or rebound.  No hepatosplenomegaly.   Musculoskeletal: extremities normal, no gross deformities noted.  Skin: no suspicious lesions or rashes   Neurologic: Mental status within normal limits.  Speech fluent.  No gross motor abnormalities and gait intact.  Psychiatric: mentation appears normal and affect normal.         Data:   Labs recently done and noted; ekg - sinus rhythm with variability, first degree av block, pac, otherwise normal.        Assessment:   1. Normal pre op exam in patient with cad and copd both of which are stable.  I believe hs is low risk for this procedure  2. Gross hematuria  3. Cad, copd, stable  4. Hypertension, controlled         Plan:   The patient is ok for the procedure  He is off his brillenta for the procedure, resume it post op when ok with urology      Zac Martínez M.D.

## 2018-09-19 ENCOUNTER — ANESTHESIA EVENT (OUTPATIENT)
Dept: SURGERY | Facility: CLINIC | Age: 78
End: 2018-09-19
Payer: COMMERCIAL

## 2018-09-19 ENCOUNTER — SURGERY (OUTPATIENT)
Age: 78
End: 2018-09-19

## 2018-09-19 ENCOUNTER — HOSPITAL ENCOUNTER (OUTPATIENT)
Facility: CLINIC | Age: 78
Discharge: HOME | End: 2018-09-19
Attending: UROLOGY | Admitting: UROLOGY
Payer: COMMERCIAL

## 2018-09-19 ENCOUNTER — ANESTHESIA (OUTPATIENT)
Dept: SURGERY | Facility: CLINIC | Age: 78
End: 2018-09-19
Payer: COMMERCIAL

## 2018-09-19 VITALS
WEIGHT: 227 LBS | SYSTOLIC BLOOD PRESSURE: 138 MMHG | DIASTOLIC BLOOD PRESSURE: 71 MMHG | OXYGEN SATURATION: 96 % | TEMPERATURE: 97.2 F | HEIGHT: 71 IN | BODY MASS INDEX: 31.78 KG/M2 | RESPIRATION RATE: 16 BRPM

## 2018-09-19 DIAGNOSIS — R31.0 GROSS HEMATURIA: ICD-10-CM

## 2018-09-19 PROCEDURE — 40000170 ZZH STATISTIC PRE-PROCEDURE ASSESSMENT II: Performed by: UROLOGY

## 2018-09-19 PROCEDURE — 52310 CYSTOSCOPY AND TREATMENT: CPT | Mod: 51 | Performed by: UROLOGY

## 2018-09-19 PROCEDURE — 37000008 ZZH ANESTHESIA TECHNICAL FEE, 1ST 30 MIN: Performed by: UROLOGY

## 2018-09-19 PROCEDURE — C1769 GUIDE WIRE: HCPCS | Performed by: UROLOGY

## 2018-09-19 PROCEDURE — 25000566 ZZH SEVOFLURANE, EA 15 MIN: Performed by: UROLOGY

## 2018-09-19 PROCEDURE — 52281 CYSTOSCOPY AND TREATMENT: CPT | Performed by: UROLOGY

## 2018-09-19 PROCEDURE — 25000125 ZZHC RX 250: Performed by: NURSE ANESTHETIST, CERTIFIED REGISTERED

## 2018-09-19 PROCEDURE — 37000009 ZZH ANESTHESIA TECHNICAL FEE, EACH ADDTL 15 MIN: Performed by: UROLOGY

## 2018-09-19 PROCEDURE — 25000128 H RX IP 250 OP 636: Performed by: NURSE ANESTHETIST, CERTIFIED REGISTERED

## 2018-09-19 PROCEDURE — 71000012 ZZH RECOVERY PHASE 1 LEVEL 1 FIRST HR: Performed by: UROLOGY

## 2018-09-19 PROCEDURE — 25000128 H RX IP 250 OP 636: Performed by: UROLOGY

## 2018-09-19 PROCEDURE — 36000050 ZZH SURGERY LEVEL 2 1ST 30 MIN: Performed by: UROLOGY

## 2018-09-19 PROCEDURE — 71000027 ZZH RECOVERY PHASE 2 EACH 15 MINS: Performed by: UROLOGY

## 2018-09-19 PROCEDURE — 27210794 ZZH OR GENERAL SUPPLY STERILE: Performed by: UROLOGY

## 2018-09-19 PROCEDURE — 36000052 ZZH SURGERY LEVEL 2 EA 15 ADDTL MIN: Performed by: UROLOGY

## 2018-09-19 RX ORDER — CEFAZOLIN SODIUM 2 G/100ML
2 INJECTION, SOLUTION INTRAVENOUS
Status: COMPLETED | OUTPATIENT
Start: 2018-09-19 | End: 2018-09-19

## 2018-09-19 RX ORDER — NALOXONE HYDROCHLORIDE 0.4 MG/ML
.1-.4 INJECTION, SOLUTION INTRAMUSCULAR; INTRAVENOUS; SUBCUTANEOUS
Status: DISCONTINUED | OUTPATIENT
Start: 2018-09-19 | End: 2018-09-19 | Stop reason: HOSPADM

## 2018-09-19 RX ORDER — LIDOCAINE HYDROCHLORIDE 20 MG/ML
INJECTION, SOLUTION INFILTRATION; PERINEURAL PRN
Status: DISCONTINUED | OUTPATIENT
Start: 2018-09-19 | End: 2018-09-19

## 2018-09-19 RX ORDER — HYDROMORPHONE HYDROCHLORIDE 1 MG/ML
.3-.5 INJECTION, SOLUTION INTRAMUSCULAR; INTRAVENOUS; SUBCUTANEOUS EVERY 10 MIN PRN
Status: DISCONTINUED | OUTPATIENT
Start: 2018-09-19 | End: 2018-09-19 | Stop reason: HOSPADM

## 2018-09-19 RX ORDER — MEPERIDINE HYDROCHLORIDE 25 MG/ML
12.5 INJECTION INTRAMUSCULAR; INTRAVENOUS; SUBCUTANEOUS
Status: DISCONTINUED | OUTPATIENT
Start: 2018-09-19 | End: 2018-09-19 | Stop reason: HOSPADM

## 2018-09-19 RX ORDER — FENTANYL CITRATE 50 UG/ML
25-50 INJECTION, SOLUTION INTRAMUSCULAR; INTRAVENOUS
Status: DISCONTINUED | OUTPATIENT
Start: 2018-09-19 | End: 2018-09-19 | Stop reason: HOSPADM

## 2018-09-19 RX ORDER — CEFAZOLIN SODIUM 1 G/3ML
1 INJECTION, POWDER, FOR SOLUTION INTRAMUSCULAR; INTRAVENOUS SEE ADMIN INSTRUCTIONS
Status: DISCONTINUED | OUTPATIENT
Start: 2018-09-19 | End: 2018-09-19 | Stop reason: HOSPADM

## 2018-09-19 RX ORDER — ONDANSETRON 2 MG/ML
4 INJECTION INTRAMUSCULAR; INTRAVENOUS EVERY 30 MIN PRN
Status: DISCONTINUED | OUTPATIENT
Start: 2018-09-19 | End: 2018-09-19 | Stop reason: HOSPADM

## 2018-09-19 RX ORDER — CIPROFLOXACIN 500 MG/1
500 TABLET, FILM COATED ORAL 2 TIMES DAILY
Qty: 6 TABLET | Refills: 0 | Status: SHIPPED | OUTPATIENT
Start: 2018-09-19 | End: 2019-07-01

## 2018-09-19 RX ORDER — PROPOFOL 10 MG/ML
INJECTION, EMULSION INTRAVENOUS PRN
Status: DISCONTINUED | OUTPATIENT
Start: 2018-09-19 | End: 2018-09-19

## 2018-09-19 RX ORDER — SODIUM CHLORIDE, SODIUM LACTATE, POTASSIUM CHLORIDE, CALCIUM CHLORIDE 600; 310; 30; 20 MG/100ML; MG/100ML; MG/100ML; MG/100ML
INJECTION, SOLUTION INTRAVENOUS CONTINUOUS PRN
Status: DISCONTINUED | OUTPATIENT
Start: 2018-09-19 | End: 2018-09-19

## 2018-09-19 RX ORDER — ONDANSETRON 4 MG/1
4 TABLET, ORALLY DISINTEGRATING ORAL EVERY 30 MIN PRN
Status: DISCONTINUED | OUTPATIENT
Start: 2018-09-19 | End: 2018-09-19 | Stop reason: HOSPADM

## 2018-09-19 RX ORDER — SODIUM CHLORIDE, SODIUM LACTATE, POTASSIUM CHLORIDE, CALCIUM CHLORIDE 600; 310; 30; 20 MG/100ML; MG/100ML; MG/100ML; MG/100ML
INJECTION, SOLUTION INTRAVENOUS CONTINUOUS
Status: DISCONTINUED | OUTPATIENT
Start: 2018-09-19 | End: 2018-09-19 | Stop reason: HOSPADM

## 2018-09-19 RX ORDER — ONDANSETRON 2 MG/ML
INJECTION INTRAMUSCULAR; INTRAVENOUS PRN
Status: DISCONTINUED | OUTPATIENT
Start: 2018-09-19 | End: 2018-09-19

## 2018-09-19 RX ORDER — FENTANYL CITRATE 50 UG/ML
INJECTION, SOLUTION INTRAMUSCULAR; INTRAVENOUS PRN
Status: DISCONTINUED | OUTPATIENT
Start: 2018-09-19 | End: 2018-09-19

## 2018-09-19 RX ADMIN — SODIUM CHLORIDE, POTASSIUM CHLORIDE, SODIUM LACTATE AND CALCIUM CHLORIDE: 600; 310; 30; 20 INJECTION, SOLUTION INTRAVENOUS at 11:12

## 2018-09-19 RX ADMIN — PROPOFOL 50 MG: 10 INJECTION, EMULSION INTRAVENOUS at 11:20

## 2018-09-19 RX ADMIN — FENTANYL CITRATE 50 MCG: 50 INJECTION, SOLUTION INTRAMUSCULAR; INTRAVENOUS at 11:20

## 2018-09-19 RX ADMIN — FENTANYL CITRATE 50 MCG: 50 INJECTION, SOLUTION INTRAMUSCULAR; INTRAVENOUS at 11:12

## 2018-09-19 RX ADMIN — ONDANSETRON 4 MG: 2 INJECTION INTRAMUSCULAR; INTRAVENOUS at 11:26

## 2018-09-19 RX ADMIN — PROPOFOL 150 MG: 10 INJECTION, EMULSION INTRAVENOUS at 11:12

## 2018-09-19 RX ADMIN — PHENYLEPHRINE HYDROCHLORIDE 200 MCG: 10 INJECTION, SOLUTION INTRAMUSCULAR; INTRAVENOUS; SUBCUTANEOUS at 11:20

## 2018-09-19 RX ADMIN — CEFAZOLIN SODIUM 2 G: 2 INJECTION, SOLUTION INTRAVENOUS at 11:15

## 2018-09-19 RX ADMIN — LIDOCAINE HYDROCHLORIDE 100 MG: 20 INJECTION, SOLUTION INFILTRATION; PERINEURAL at 11:12

## 2018-09-19 ASSESSMENT — LIFESTYLE VARIABLES: TOBACCO_USE: 1

## 2018-09-19 ASSESSMENT — COPD QUESTIONNAIRES: COPD: 1

## 2018-09-19 NOTE — OP NOTE
Procedure Date: 09/19/2018      PREOPERATIVE DIAGNOSIS:  Gross hematuria, urethral stricture.      POSTOPERATIVE DIAGNOSIS:  Gross hematuria, urethral stricture, prostatic calculus.      PROCEDURE:  Examination under anesthesia, Cornelius urethral dilation, cystopanendoscopy, removal of prostatic calculus and cautery of prostatic veins.      SURGEON:  Ti Gentile Jr., MD      ANESTHESIA:  General laryngeal mask.      ESTIMATED BLOOD LOSS:  10 mL.      INDICATIONS:  The patient is a 77-year-old male who has had radiation for prostate cancer in the past and has had some gross hematuria.  He takes an anticoagulant called Brilinta and has been off this for the last 10 days.  Office cystoscopy revealed a narrow urethral stricture in the bulbous urethra.  He now presents for dilation of the strictures so proper cystoscopy can be carried out to rule out a bladder tumor.      DESCRIPTION OF THE PROCEDURE:  The patient was taken to cystoscopy and was placed supine on the operating table.  After adequate general laryngeal mask anesthesia, the patient was placed in lithotomy position and his genitalia were prepped and draped in a sterile fashion.  A Glidewire was passed through the urethra easily into the bladder where it curled.  I then dilated the bulbous urethral stricture with Cornelius sounds from 20 Niuean to 24 Niuean.  I then removed the sounds and the Glidewire and performed urethroscopy and cystoscopy using a 22-Niuean Storz cystoscope with 30 and 70-degree lenses.  The urethra appeared normal except for the dilated stricture.  The prostatic fossa was open and revealed circuitous superficial veins that bled easily.  The bladder revealed no tumors or raised erythema or stones.  There was one large prostatic calculus on the patient's left lateral lobe that was removed with the cup biopsy forceps.        Because of the dilation and passing the cystoscope, prostatic veins were bleeding that were lightly cauterized.  I  then passed an 18-Grenadian Marion into the bladder.  I placed 5 mL of saline in the Marion balloon, placed it to closed gravity drainage.  Rectal exam revealed no rectal mass and no palpable prostate tissue.  The patient was discharged to the PACU in satisfactory condition and will be sent home on Cipro 500 mg every 12 hours for the next 3 days.  He will call the office when his urine is clear and will remove his Marion catheter hopefully in the next 24-48 hours.         CHARITY OLIVIA JR, MD             D: 2018   T: 2018   MT: DIANA      Name:     WESLEY SPARKS   MRN:      [id number]        Account:        [id number]   :      1940           Procedure Date: 2018      Document: [id number]       cc: Zac Olivia Jr, MD

## 2018-09-19 NOTE — ANESTHESIA POSTPROCEDURE EVALUATION
Patient: Romel Bonilla    Procedure(s):  VIDEO CYSTOSCOPY WITH GASCA DILATION , WITH EXTRACTION OF  PROSTATIC STONE. CAUTERY OF PROASTATIC VEINS   - Wound Class: II-Clean Contaminated    Diagnosis:URETHRAL STRICTURE  Diagnosis Additional Information: No value filed.    Anesthesia Type:  General, LMA    Note:  Anesthesia Post Evaluation    Patient location during evaluation: PACU  Patient participation: Able to fully participate in evaluation  Level of consciousness: awake, awake and alert and responsive to verbal stimuli  Pain management: adequate  Airway patency: patent  Cardiovascular status: acceptable  Respiratory status: acceptable  Hydration status: acceptable  PONV: none     Anesthetic complications: None          Last vitals:  Vitals:    09/19/18 1230 09/19/18 1245 09/19/18 1345   BP: (!) 156/94 153/81 138/71   Resp: 14 16 16   Temp: 36.2  C (97.2  F) 36.2  C (97.2  F)    SpO2: 93% 96% 96%         Electronically Signed By: Jennifer Townsend  September 19, 2018  1:56 PM

## 2018-09-19 NOTE — IP AVS SNAPSHOT
[facility] PreOP/Phase II    [address]    [place] [address]    Phone:  [phone]                                       After Visit Summary   9/19/2018    Romel Bonilla    MRN: [id number]           After Visit Summary Signature Page     I have received my discharge instructions, and my questions have been answered. I have discussed any challenges I see with this plan with the nurse or doctor.    ..........................................................................................................................................  Patient/Patient Representative Signature      ..........................................................................................................................................  Patient Representative Print Name and Relationship to Patient    ..................................................               ................................................  Date                                   Time    ..........................................................................................................................................  Reviewed by Signature/Title    ...................................................              ..............................................  Date                                               Time          22EPIC Rev 08/18

## 2018-09-19 NOTE — DISCHARGE INSTRUCTIONS
Call when urine clear, our office will remove the bustamante catheter  Start Cipro tonight with evening meal        Do not restart your brilinta blood thinner medication until your urine is clear- yellow and your bustamante cathater has been removed       Same Day Surgery Discharge Instructions for  Sedation and General Anesthesia       It's not unusual to feel dizzy, light-headed or faint for up to 24 hours after surgery or while taking pain medication.  If you have these symptoms: sit for a few minutes before standing and have someone assist you when you get up to walk or use the bathroom.      You should rest and relax for the next 24 hours. We recommend you make arrangements to have an adult stay with you for at least 24 hours after your discharge.  Avoid hazardous and strenuous activity.      DO NOT DRIVE any vehicle or operate mechanical equipment for 24 hours following the end of your surgery.  Even though you may feel normal, your reactions may be affected by the medication you have received.      Do not drink alcoholic beverages for 24 hours following surgery.       Slowly progress to your regular diet as you feel able. It's not unusual to feel nauseated and/or vomit after receiving anesthesia.  If you develop these symptoms, drink clear liquids (apple juice, ginger ale, broth, 7-up, etc. ) until you feel better.  If your nausea and vomiting persists for 24 hours, please notify your surgeon.        All narcotic pain medications, along with inactivity and anesthesia, can cause constipation. Drinking plenty of liquids and increasing fiber intake will help.      For any questions of a medical nature, call your surgeon.      Do not make important decisions for 24 hours.      If you had general anesthesia, you may have a sore throat for a couple of days related to the breathing tube used during surgery.  You may use Cepacol lozenges to help with this discomfort.  If it worsens or if you develop a fever, contact your  surgeon.       If you feel your pain is not well managed with the pain medications prescribed by your surgeon, please contact your surgeon's office to let them know so they can address your concerns.       Cystoscopy Discharge Instructions      Diet:    Return to the diet that you were on before the procedure, unless you are given specific diet instructions.    It is important to drink 6-8 glasses of fluids per day at home - at least 3-4 glasses should be water.    Activity:    Walk short distances and increase as your strength allows.    You may climb stairs.    Do not do strenuous exercise or heavy lifting until approved by surgeon.    Do not drive while taking narcotic pain medications.    Bathing:    You may take a shower.    Call your physician if these signs/symptoms are present:    Pain that is not relieved by a short rest or ordered pain medications.    Temperature at or above 101.0 F or chills.    Inability or difficulty urinating.  Excessive blood in urine.    Any questions or concerns.    DISCHARGE INSTRUCTIONS FOR   CATHETER CARE AT HOME      .      Basic Catheter Care  1. Always wash hands before and after handling your catheter.  2. Use soap and water to wash the area around your catheter.  3. Do this procedure twice a day.  4. Proper cleansing will help keep the area from becoming irritated or infected.    Leg Bag  This is a small plastic bag that collects urine draining from your catheter and then strapped around your thigh. It will need to be emptied when the bag is 1/2 to 3/4 full.     Large Drainage Bag  1. This bag is larger than the leg bag and holds more urine.  It is to be used while at home, especially at night.    2. Before you go to bed, change the leg bag to the large drainage bag.  3. Pinch off the catheter with your fingers and swab the connection between the catheter and leg bag with alcohol sponge.  4. Disconnect the leg bag and connect the large drainage bag to your catheter.  5. When  you get into bed, arrange the drainage tubing so that it doesn t kink.  6. Be sure to keep the bag below the level of your bladder and allow enough slack for turning.    Cleaning Your Drainage Bags    1. Wash hands.  2. Using funnel or syringe, fill the bag half full with a solution of 1/2  vinegar and 1/2 water.  3. Shake bag, allowing mixture to cleanse inside of bag.  4. Empty out all vinegar and water mixture from your bag.  5. Hang bag to dry when not in use.  6. Clean your bags anytime you change them.      Helpful Hints  1. Always keep drainage bags below bladder level to insure adequate drainage.  2. Drink 4-6 glasses of water daily along with other fluids you normally drink to keep urine free of infection and / or clots.  3. If you notice no urine in your bag for 2 to 4 hours or you develop extreme discomfort in bladder area, your catheter maybe plugged.  Notify your doctor.  4. If you notice your urine becomes foul smelling and cloudy, notify your doctor.  Also notify your doctor if you develop fever or chills.  5. If you notice urine leaking around the outside of the catheter, check to be sure catheter or tubing is not kinked.  6. Don t use leg bag while in bed.      **If you have questions or concerns about your procedure,  call Dr. Gentile at [phone]**

## 2018-09-19 NOTE — ANESTHESIA PREPROCEDURE EVALUATION
Procedure: Procedure(s):  COMBINED CYSTOSCOPY, DILATE URETHRA  Preop diagnosis: URETHRAL STRICTURE    Allergies   Allergen Reactions     Cefprozil Diarrhea     Aspirin      Urinary retention     Plavix [Clopidogrel] Itching     Past Medical History:   Diagnosis Date     Anxiety      Ascending aorta dilatation (H)      Asthma     Dr. Lennox     COPD (chronic obstructive pulmonary disease) (H)     bronchiectasis on multiple inhalers      Coronary artery disease     Cath 3/14/16- 95% ostail RCA stenosis>>CECILIO placed, fu nuclear est 5/17 nl     Essential hypertension, benign     off medications with life style     GERD (gastroesophageal reflux disease)      Groin hematoma     right- 3/2016 post angio     Gross hematuria 09/2018    ct neg, Dr. Gentile     Hoarseness      Hyperglycemia      Hyperlipidemia LDL goal <70      Lung nodule 2012    indeterminate-3 mm-needs 12 month follow up; fu [facility] 3/16 [facility] and benign, no fu needed     Obesity, unspecified      Osteoarthrosis, unspecified whether generalized or localized, unspecified site      Prostate CA (H) 2012    metastatic-guerita's score 7, seeds and xrt, now seeing Dr. Gentile     Urinary retention 2016    due to asa     Past Surgical History:   Procedure Laterality Date     C NONSPECIFIC PROCEDURE  7/01    Rt knee arthroscopy and menisectomy     C NONSPECIFIC PROCEDURE  2000    RT HERNIA REPAIR     CATARACT IOL, RT/LT  2016     CYSTOSCOPY       HEART CATH LEFT HEART CATH  3/14/16    95% ostail RCA stenosis>>CECILIO placed     INSERT RADIATION SEEDS PROSTATE  7/10/2012    seeds and xrt     ROTATOR CUFF REPAIR RT/LT  2016     Social History   Substance Use Topics     Smoking status: Former Smoker     Packs/day: 1.00     Years: 20.00     Types: Cigarettes     Quit date: 1/26/1994     Smokeless tobacco: Never Used     Alcohol use 0.0 oz/week     0 Standard drinks or equivalent per week      Comment: occ beer     Prior to Admission medications    Medication Sig Start Date  End Date Taking? Authorizing Provider   Acetaminophen (TYLENOL PO) Take by mouth every 6 hours as needed for mild pain or fever   Yes Reported, Patient   atorvastatin (LIPITOR) 40 MG tablet Take 1 tablet (40 mg) by mouth At Bedtime 5/21/18  Yes Awais Mathew MD   cetirizine (ZYRTEC) 10 MG tablet Take 1 tablet (10 mg) by mouth every evening   Yes Zac Martínez MD   cinnamon 500 MG CAPS Take 1 capsule by mouth daily   Yes Zac Martínez MD   fluticasone (FLONASE) 50 MCG/ACT spray Spray 1-2 sprays into both nostrils daily 9/14/17  Yes Zac Martínez MD   fluticasone (FLOVENT DISKUS) 100 MCG/BLIST AEPB Inhale 1 puff into the lungs 2 times daily 1/25/18  Yes Zac Martínez MD   guaiFENesin (MUCINEX) 600 MG 12 hr tablet Take 1 tablet (600 mg) by mouth 2 times daily   Yes Zac Martínez MD   MULTIVITAMIN TABS   OR 2 tabs daily 8/25/10  Yes Reported, Patient   PROAIR  (90 Base) MCG/ACT inhaler INHALE 2 PUFFS INTO THE LUNGS EVERY 4 HOURS AS NEEDED FOR SHORTNESS OF BREATH OR DIFFICULT BREATHING OR WHEEZING 5/15/18  Yes Zac Martínez MD   ticagrelor (BRILINTA) 90 MG tablet Take 1 tablet (90 mg) by mouth 2 times daily 2/23/18  Yes Awais Mathew MD   Turmeric 500 MG TABS Take 2 tablets by mouth daily   Yes Zac Martínez MD   nitroglycerin (NITROSTAT) 0.4 MG SL tablet Place 1 tablet (0.4 mg) under the tongue every 5 minutes as needed for chest pain if you are still having symptoms after 3 doses (15 minutes) call 911.  Patient not taking: Reported on 9/14/2018 3/15/16   Sarahy Rojas, APRN CNP     Current Facility-Administered Medications Ordered in Epic   Medication Dose Route Frequency Last Rate Last Dose     ceFAZolin (ANCEF) 1 g vial to attach to  ml bag for ADULT or 50 ml bag for PEDS  1 g Intravenous See Admin Instructions         ceFAZolin (ANCEF) intermittent infusion 2 g in 100 mL dextrose PRE-MIX  2 g Intravenous Pre-Op/Pre-procedure x  1 dose         No current [facility]-ordered outpatient prescriptions on file.       Wt Readings from Last 1 Encounters:   09/19/18 103 kg (227 lb)     Temp Readings from Last 1 Encounters:   09/19/18 36.3  C (97.3  F) (Oral)     BP Readings from Last 6 Encounters:   09/19/18 158/77   09/14/18 139/76   08/21/18 112/74   08/20/18 113/73   07/16/18 126/77   06/26/18 127/80     Pulse Readings from Last 4 Encounters:   09/06/18 88   08/21/18 87   08/20/18 72   07/16/18 78     Resp Readings from Last 1 Encounters:   09/19/18 20     SpO2 Readings from Last 1 Encounters:   09/19/18 97%     Recent Labs   Lab Test  08/20/18   0854  07/16/18   1025   NA  141  140   POTASSIUM  4.5  4.2   CHLORIDE  108*  110*   CO2  24  22   ANIONGAP  13.5  8   GLC  118*  107*   BUN  16  17   CR  1.04  0.93   NNAMDI  9.3  9.0     Recent Labs   Lab Test  05/21/18   0951  09/14/17   0957  03/09/16   AST   --   21   --   20   ALT  23  32   < >  27   ALKPHOS   --   87   --   66   BILITOTAL   --   0.8   --   0.7    < > = values in this interval not displayed.     Recent Labs   Lab Test  07/16/18   1025  09/14/17   0957   WBC  5.7  6.2   HGB  13.8  13.8   PLT  249  252     No results for input(s): ABO, RH in the last 74278 hours.  Recent Labs   Lab Test  03/14/16   1143   INR  0.96   PTT  28      No results for input(s): TROPI in the last 22524 hours.  No results for input(s): PH, PCO2, PO2, HCO3 in the last 60337 hours.  No results for input(s): HCG in the last 40654 hours.  Recent Results (from the past 744 hour(s))   CT Abdomen Pelvis w/o & w Contrast [[id number]]    Narrative    CT ABDOMEN AND PELVIS WITHOUT AND WITH CONTRAST 9/6/2018 8:56 AM     HISTORY: Hematuria, history of prostate cancer. Gross hematuria.     TECHNIQUE: Axial images are obtained from the lung bases to the  symphysis without IV contrast. Following the administration of 100 mL  of Isovue 370, additional axial images are obtained from the lung  bases to the symphysis during corticomedullary  and excretory phases.  Coronal and sagittal reformatted images are also generated. Radiation  dose for this scan was reduced using automated exposure control,  adjustment of the mA and/or kV according to patient size, or iterative  reconstruction technique.    FINDINGS: The lung bases are clear with mild subpleural scarring at  the posterolateral right lung base. Calcified granuloma is noted in  the left lower lobe.    Abdomen: Calcified granulomas are present in the liver and spleen  which are otherwise unremarkable. The gallbladder, pancreas and  adrenal glands are unremarkable. No enlarged abdominal lymph nodes.  Aorta is mildly tortuous and calcified without aneurysm. No evidence  of aortic dissection. The bowel is normal in caliber with scattered  diverticula. No evidence of obstruction, diverticulitis or  appendicitis.    Right urinary tract:  No renal masses or cysts are present. No  collecting system or ureteral stones are present. There is no  hydronephrosis. Delayed imaging demonstrates a normal right renal  collecting system and ureter. Ureter is normal in caliber and course.    Left urinary tract:  No renal masses or cysts are present. No  collecting system or ureteral stones are present. There is no  hydronephrosis. Delayed imaging demonstrates a normal left renal  collecting system and ureter. Ureter is normal in caliber and course.    Bladder:  No bladder calculi are appreciated. No bladder wall  thickening or nodularity is appreciated. Delayed images show no  evidence of filling defects.    Pelvis:  Metallic seeds are present in the prostate gland which is  otherwise normal in size. Seminal vesicles and rectum are  unremarkable. No enlarged pelvic or inguinal lymph nodes.  Fat-containing left inguinal hernia is present. Bone window  examination demonstrates osteopenia and degenerative spine changes. No  aggressive-appearing bone lesions.      Impression    IMPRESSION:    1. No urinary tract calculi  or hydronephrosis. Collecting systems,  ureters and bladder are unremarkable. No renal cyst or mass.  2. Metallic seeds are noted in the prostate gland which is otherwise  normal in size. No enlarged lymph nodes.  3. Incidental fat-containing left inguinal hernia.  4. Evidence of old granulomatous disease.     KEYUR CALDWELL MD         Anesthesia Evaluation     . Pt has had prior anesthetic.     No history of anesthetic complications          ROS/MED HX    ENT/Pulmonary: Comment: [place]    (+)tobacco use, Mild Persistent asthma COPD, , . .   (-) sleep apnea   Neurologic:       Cardiovascular:     (+) Dyslipidemia, hypertension--CAD, -past MI,-stent,. Taking blood thinners Pt has received instructions: Instructions Given to patient: off for 7 days. . . :. valvular problems/murmurs type: AI mild:. Previous cardiac testing Echodate:results:  Left ventricular systolic function is normal.  The visual ejection fraction is estimated at 55-60%.  There is mild (1+) aortic regurgitation.  The study was technically adequate. Compared to the prior study dated 2016,  there have been no changes.Stress Testdate: results:Impression  1. Myocardial perfusion imaging using single isotope technique  demonstrated no evidence for myocardial ischemia.   2. Gated images demonstrated normal wall motion with a calculated  ejection fraction of 54%.   3. Compared to the prior study from 2016 the previously described  inferior reversible defect is no longer seen . date: results: date: results:          METS/Exercise Tolerance:     Hematologic:         Musculoskeletal:         GI/Hepatic:     (+) GERD       Renal/Genitourinary:         Endo:     (+) Obesity, .      Psychiatric:         Infectious Disease:         Malignancy:   (+) Malignancy History of Prostate          Other:                     Physical Exam      Airway   Mallampati: I  TM distance: >3 FB  Neck ROM: full    Dental   (+) caps    Cardiovascular       Pulmonary                      Anesthesia Plan      History & Physical Review  History and physical reviewed and following examination; no interval change.    ASA Status:  3 .    NPO Status:  > 8 hours    Plan for General and LMA   PONV prophylaxis:  Ondansetron (or other 5HT-3) and Dexamethasone or Solumedrol       Postoperative Care      Consents  Anesthetic plan, risks, benefits and alternatives discussed with:  Patient..                          .

## 2018-09-19 NOTE — BRIEF OP NOTE
[facility] Brief Operative Note    Pre-operative diagnosis: URETHRAL STRICTURE, GROSS HEMATURIA   Post-operative diagnosis gross hematuria, urethral stricture, prostatic calculus     Procedure: Procedure(s):  VIDEO CYSTOSCOPY WITH GASCA DILATION , WITH EXTRACTION OF  PROSTATIC STONE. CAUTERY OF PROASTATIC VEINS   - Wound Class: II-Clean Contaminated   Surgeon(s): Surgeon(s) and Role: Ti Gentile MD - Primary   Estimated blood loss: 10cc    Specimens: none   Findings: No bladder tumor

## 2018-09-19 NOTE — OR NURSING
DR Gentile at bedside- bustamante to be d/alli when urine is anuj and no blood- may restart brilinta blood thinner when no blood in urine and  Bustamante has been discontinued- Friend Edna at bedside for bustamante cath teaching- verbalized undestanding, denies questions- good clean technique observed- PNDS met, po per I&O sheet. Pt dressed, up in recliner and transported to Phase 2.

## 2018-09-19 NOTE — ANESTHESIA CARE TRANSFER NOTE
Patient: Romel Bonilla    Procedure(s):  VIDEO CYSTOSCOPY WITH GASCA DILATION , WITH EXTRACTION OF  PROSTATIC STONE. CAUTERY OF PROASTATIC VEINS   - Wound Class: II-Clean Contaminated    Diagnosis: URETHRAL STRICTURE  Diagnosis Additional Information: No value filed.    Anesthesia Type:   General, LMA     Note:  Airway :Face Mask  Patient transferred to:PACU  Comments: A&O x 3.  Denies pain, N&V.  Report to RN.      Vitals: (Last set prior to Anesthesia Care Transfer)    CRNA VITALS  9/19/2018 1118 - 9/19/2018 1156      9/19/2018             NIBP: 146/83    NIBP Mean: 100                Electronically Signed By: Urmila Arevalo  September 19, 2018  11:56 AM

## 2018-09-20 ENCOUNTER — APPOINTMENT (OUTPATIENT)
Dept: UROLOGY | Facility: CLINIC | Age: 78
End: 2018-09-20
Payer: COMMERCIAL

## 2018-09-24 ENCOUNTER — TELEPHONE (OUTPATIENT)
Dept: UROLOGY | Facility: CLINIC | Age: 78
End: 2018-09-24

## 2018-09-24 NOTE — TELEPHONE ENCOUNTER
Romel had surgery last week and catheter has been removed. He states he was having some urgency but over the last day this has improved. He reports nocturia x 1 last night. Encouraged him to avoid dietary irritants  As this may help with some of the urgency. He is leaving for the winter on Wed to [place]. Encouraged him to call for a UA/UC appointment tomorrow if symptoms do not continue to improve.Kirsty Chau LPN

## 2019-02-20 ENCOUNTER — TELEPHONE (OUTPATIENT)
Dept: CARDIOLOGY | Facility: CLINIC | Age: 79
End: 2019-02-20

## 2019-02-20 NOTE — TELEPHONE ENCOUNTER
Pt called wondering if there has been a cheaper version of Brilinta. Pt unable to take Plavix or ASA due to allergy. Brilinta off med list by urology (accident). Med list updated. Informed patient that unfortunately there has not been a cheaper Brilinta. Pt was ok to continue brilinta for the time being and discuss further with Dr. Miller in May 2019     8/20/18 OV with Luh NP   1. Coronary artery disease    Status post rotablation and PCI with stent placement to a 99% stenosis of the ostial RCA in March of 2016    Currently on Brilinta due to aspirin and Plavix allergy    Establish care with Dr. Miller in the event of Dr. Mathew's custodial in May 2018    History of ischemic heart disease, status post rotablation, PTCA and stent placed in the ostial right coronary artery with moderate disease elsewhere. Brilinta will be continued because he has itching response to clopidogrel and has an allergy to aspirin.

## 2019-02-26 DIAGNOSIS — J45.30 MILD PERSISTENT ASTHMA WITHOUT COMPLICATION: ICD-10-CM

## 2019-02-27 NOTE — TELEPHONE ENCOUNTER
ACT Total Scores 2/1/2012 3/25/2013 7/16/2018   ACT TOTAL SCORE 23 22 -   ASTHMA ER VISITS 0 = None 0 = None -   ASTHMA HOSPITALIZATIONS 0 = None 0 = None -   ACT TOTAL SCORE (Goal Greater than or Equal to 20) - - 18   In the past 12 months, how many times did you visit the emergency room for your asthma without being admitted to the hospital? - - 0   In the past 12 months, how many times were you hospitalized overnight because of your asthma? - - 0     Routing refill request to provider for review/approval because:  Last ACT score <20    Mary Ann HAMILTON RN

## 2019-02-27 NOTE — TELEPHONE ENCOUNTER
"FLOVENT DISKUS 100 MCG/BLIST inhaler     Last Written Prescription Date:  01/25/2018  Last Fill Quantity: 3,  # refills: 3   Last office visit: 9/14/2018 with prescribing provider:  Tanya   Future Office Visit:  Unknown     Requested Prescriptions   Pending Prescriptions Disp Refills     FLOVENT DISKUS 100 MCG/BLIST inhaler [Pharmacy Med Name: FLOVENT DISKUS 100 MCG/BLIST Aerosol Powder Breath Activated]  3     Sig: INHALE 1 PUFF INTO THE LUNGS 2 TIMES DAILY    Inhaled Steroids Protocol Failed - 2/26/2019 10:01 AM       Failed - Asthma control assessment score within normal limits in last 6 months    Please review ACT score.          Passed - Patient is age 12 or older       Passed - Medication is active on med list       Passed - Recent (6 mo) or future (30 days) visit within the authorizing provider's specialty    Patient had office visit in the last 6 months or has a visit in the next 30 days with authorizing provider or within the authorizing provider's specialty.  See \"Patient Info\" tab in inbasket, or \"Choose Columns\" in Meds & Orders section of the refill encounter.              "

## 2019-03-14 DIAGNOSIS — I25.10 CORONARY ARTERY DISEASE INVOLVING NATIVE CORONARY ARTERY OF NATIVE HEART WITHOUT ANGINA PECTORIS: ICD-10-CM

## 2019-03-14 DIAGNOSIS — I25.10 CORONARY ARTERY DISEASE INVOLVING NATIVE CORONARY ARTERY OF NATIVE HEART WITHOUT ANGINA PECTORIS: Primary | ICD-10-CM

## 2019-03-18 ENCOUNTER — TELEPHONE (OUTPATIENT)
Dept: CARDIOLOGY | Facility: CLINIC | Age: 79
End: 2019-03-18

## 2019-03-18 NOTE — LETTER
[facility]  [address]  [place] [address]  Phone: [phone]  Fax: [phone]     Attn: Appeal Manager  [employer]  Re: Romel Bonilla (: 36)        To Whom it May Concern,      This is a letter of medical necessity in order for patient Romel Bonilla's tier exception for Brilinta to be approved. Romel Bonilla is a very pleasant 77 year old patient, who has a past medical history significant for coronary artery disease status post rotablation and stenting to the ostial RCA in 2016, chronic bronchitis/COPD with  previous tobacco abuse (quit in ), hypertension, hyperlipidemia and osteoarthritis. The patient has documented allergies to both aspirin and Plavix, leaving Brilinta as his only antiplatelet therapy option and he will need to remain on this medication given his coronary disease history.       If you have any further questions, please contact our clinic at [phone]        Thank you,          FELY Pollard CNP

## 2019-03-18 NOTE — TELEPHONE ENCOUNTER
Prior Authorization Retail Medication Request    Medication/Dose: brillinta 90mg  ICD code (if different than what is on RX):  CAD-pci , CECILIO to RCA 2016  Previously Tried and Failed:  plavix,ASA    Rationale:Currently on Brilinta due to aspirin and Plavix allergy            Insurance Name:  Not on fax  Insurance ID:  [id number]  Plan # [phone]      Pharmacy Information (if different than what is on RX)  Name:  [facility]  Phone:  [phone]  Fax [phone]

## 2019-03-20 NOTE — TELEPHONE ENCOUNTER
PA Initiation    Medication: Brillinta 90mg- Tier Exception -   Insurance Company: [employer] - Phone [phone] Fax [phone]  Pharmacy Filling the Rx: [facility] - [place] [address]  Filling Pharmacy Phone: [phone]  Filling Pharmacy Fax: [phone]  Start Date: 3/20/2019

## 2019-03-22 NOTE — TELEPHONE ENCOUNTER
Tier Exception Denied    Expected Copay:      CoPay Card Available:      Insurance Company: [employer] - Phone [phone] Fax [phone]    Medication: Brillinta 90mg- Tier Exception - DENIED    Denial Date: 3/21/2019    Denial Rational:         Appeal Information:

## 2019-03-26 NOTE — TELEPHONE ENCOUNTER
Notified per [facility] PA team that the requested Tier Exception was denied-will forward to Team 4-mmunns lpn

## 2019-03-26 NOTE — TELEPHONE ENCOUNTER
Letter of medical necessity drafted and placed on Luh's desk for signature. Will send once letter is signed.

## 2019-04-30 DIAGNOSIS — I10 ESSENTIAL HYPERTENSION, BENIGN: ICD-10-CM

## 2019-04-30 DIAGNOSIS — I25.118 CORONARY ARTERY DISEASE INVOLVING NATIVE CORONARY ARTERY OF NATIVE HEART WITH OTHER FORM OF ANGINA PECTORIS (H): ICD-10-CM

## 2019-04-30 RX ORDER — ATORVASTATIN CALCIUM 40 MG/1
40 TABLET, FILM COATED ORAL AT BEDTIME
Qty: 90 TABLET | Refills: 3 | Status: SHIPPED | OUTPATIENT
Start: 2019-04-30 | End: 2019-07-01

## 2019-05-29 ENCOUNTER — DOCUMENTATION ONLY (OUTPATIENT)
Dept: CARDIOLOGY | Facility: CLINIC | Age: 79
End: 2019-05-29

## 2019-05-29 NOTE — LETTER
May 29, 2019       TO: Romel Bonilla  [address]  [place] [address]       Dear Romel Bonilla,    We are reviewing our outstanding orders.     Dr. Mathew ordered an office visit, echo and lab work for your annual follow up. Your last visit was in August, 2018. Dr. 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 We can assist you in choosing a new cardiologist with our practice. He recommended that you follow with Dr. Miller.     Please contact the scheduling desk at [phone] to arrange for an appointment.      If you have any questions, please call the Team 2 nurse phone @ [phone]. If you had your lab work done at another facility outside of the [facility] system, please give us a call so we can locate the results.      Thank you  Team 2 nurses      Thank you,    [facility] [place]

## 2019-06-04 DIAGNOSIS — J45.30 MILD PERSISTENT ASTHMA WITHOUT COMPLICATION: ICD-10-CM

## 2019-06-04 NOTE — TELEPHONE ENCOUNTER
"FLOVENT DISKUS 100 MCG/BLIST inhaler 3 each 0 2/27/2019     Last Written Prescription Date:  2/27/19  Last Fill Quantity: 3,  # refills: 0   Last office visit: 9/14/2018 with prescribing provider:  Tanya   Future Office Visit:  None    Requested Prescriptions   Pending Prescriptions Disp Refills     FLOVENT DISKUS 100 MCG/BLIST inhaler [Pharmacy Med Name: FLOVENT DISKUS 100 MCG/BLIST Aerosol Powder Breath Activated]  0     Sig: INHALE 1 PUFF TWICE DAILY       Inhaled Steroids Protocol Failed - 6/4/2019  9:31 AM        Failed - Asthma control assessment score within normal limits in last 6 months     Please review ACT score.           Failed - Recent (6 mo) or future (30 days) visit within the authorizing provider's specialty     Patient had office visit in the last 6 months or has a visit in the next 30 days with authorizing provider or within the authorizing provider's specialty.  See \"Patient Info\" tab in inbasket, or \"Choose Columns\" in Meds & Orders section of the refill encounter.            Passed - Patient is age 12 or older        Passed - Medication is active on med list        No flowsheet data found.      "

## 2019-06-05 NOTE — TELEPHONE ENCOUNTER
ACT Total Scores 2/1/2012 3/25/2013 7/16/2018   ACT TOTAL SCORE 23 22 -   ASTHMA ER VISITS 0 = None 0 = None -   ASTHMA HOSPITALIZATIONS 0 = None 0 = None -   ACT TOTAL SCORE (Goal Greater than or Equal to 20) - - 18   In the past 12 months, how many times did you visit the emergency room for your asthma without being admitted to the hospital? - - 0   In the past 12 months, how many times were you hospitalized overnight because of your asthma? - - 0     [employer] message sent to patient as he is due for OV and ACT    Routing refill request to provider for review/approval because:  Last ACT score <20    Mary Ann HAMILTON RN

## 2019-06-12 ENCOUNTER — CARE COORDINATION (OUTPATIENT)
Dept: CARDIOLOGY | Facility: CLINIC | Age: 79
End: 2019-06-12

## 2019-06-12 NOTE — PROGRESS NOTES
Call from Dr. Socrates Tuttle, states he is patient's good friend. States patient formerly saw Dr. Mathew, and since he retired, Peter was scheduled with a new provider this year. Dr. Tuttle thinks Dr. Cantrell would be a better match for patient. He requests Dr. Cantrell, Dr. Donaldson or Dr. Styles, with Dr. Cantrell being the first choice.    I called patient. I let him know about this conversation with Dr. Socrates Tuttle. Patient was ok with this, and did want to reschedule with one of our Cardiologists who has been with the practice longer and whom Dr. Tuttle was familiar with. Patient scheduled for 7/1 w/ Dr. Cantrell as Return patient New Provider. Transferred to scheduling to move up Echo to a date prior to 7/1. Naina Reno RN on 6/12/2019 at 4:04 PM

## 2019-06-21 ENCOUNTER — HOSPITAL ENCOUNTER (OUTPATIENT)
Dept: CARDIOLOGY | Facility: CLINIC | Age: 79
Discharge: HOME | End: 2019-06-21
Attending: INTERNAL MEDICINE | Admitting: INTERNAL MEDICINE
Payer: COMMERCIAL

## 2019-06-21 DIAGNOSIS — J44.9 CHRONIC OBSTRUCTIVE PULMONARY DISEASE, UNSPECIFIED COPD TYPE (H): ICD-10-CM

## 2019-06-21 DIAGNOSIS — I10 ESSENTIAL HYPERTENSION WITH GOAL BLOOD PRESSURE LESS THAN 140/90: ICD-10-CM

## 2019-06-21 DIAGNOSIS — R94.39 ABNORMAL CARDIOVASCULAR STRESS TEST: ICD-10-CM

## 2019-06-21 DIAGNOSIS — J47.9 BRONCHIECTASIS WITHOUT COMPLICATION (H): ICD-10-CM

## 2019-06-21 DIAGNOSIS — E78.00 PURE HYPERCHOLESTEROLEMIA: ICD-10-CM

## 2019-06-21 DIAGNOSIS — I25.118 CORONARY ARTERY DISEASE INVOLVING NATIVE CORONARY ARTERY OF NATIVE HEART WITH OTHER FORM OF ANGINA PECTORIS (H): ICD-10-CM

## 2019-06-21 DIAGNOSIS — I10 ESSENTIAL HYPERTENSION, BENIGN: ICD-10-CM

## 2019-06-21 DIAGNOSIS — R10.13 EPIGASTRIC DISCOMFORT: ICD-10-CM

## 2019-06-21 PROCEDURE — 93306 TTE W/DOPPLER COMPLETE: CPT | Mod: 26 | Performed by: INTERNAL MEDICINE

## 2019-06-21 PROCEDURE — 40000264 ECHOCARDIOGRAM COMPLETE

## 2019-06-21 PROCEDURE — 25500064 ZZH RX 255 OP 636: Performed by: INTERNAL MEDICINE

## 2019-06-21 RX ADMIN — HUMAN ALBUMIN MICROSPHERES AND PERFLUTREN 5 ML: 10; .22 INJECTION, SOLUTION INTRAVENOUS at 14:45

## 2019-06-27 DIAGNOSIS — I25.118 CORONARY ARTERY DISEASE INVOLVING NATIVE CORONARY ARTERY OF NATIVE HEART WITH OTHER FORM OF ANGINA PECTORIS (H): ICD-10-CM

## 2019-06-27 DIAGNOSIS — I25.118 CORONARY ARTERY DISEASE INVOLVING NATIVE CORONARY ARTERY OF NATIVE HEART WITH OTHER FORM OF ANGINA PECTORIS (H): Primary | ICD-10-CM

## 2019-06-27 LAB
ALT SERPL W P-5'-P-CCNC: 15 U/L (ref 5–30)
ANION GAP SERPL CALCULATED.3IONS-SCNC: 15 MMOL/L (ref 6–17)
BUN SERPL-MCNC: 15 MG/DL (ref 7–30)
CALCIUM SERPL-MCNC: 10 MG/DL (ref 8.5–10.5)
CHLORIDE SERPL-SCNC: 105 MMOL/L (ref 98–107)
CHOLEST SERPL-MCNC: 183 MG/DL
CO2 SERPL-SCNC: 24 MMOL/L (ref 23–29)
CREAT SERPL-MCNC: 1.03 MG/DL (ref 0.7–1.3)
GFR SERPL CREATININE-BSD FRML MDRD: 69 ML/MIN/{1.73_M2}
GLUCOSE SERPL-MCNC: 130 MG/DL (ref 70–105)
HDLC SERPL-MCNC: 63 MG/DL
LDLC SERPL CALC-MCNC: 94 MG/DL
NONHDLC SERPL-MCNC: 120 MG/DL
POTASSIUM SERPL-SCNC: 4 MMOL/L (ref 3.5–5.1)
SODIUM SERPL-SCNC: 140 MMOL/L (ref 136–145)
TRIGL SERPL-MCNC: 131 MG/DL

## 2019-06-27 PROCEDURE — 36415 COLL VENOUS BLD VENIPUNCTURE: CPT | Performed by: INTERNAL MEDICINE

## 2019-06-27 PROCEDURE — 84460 ALANINE AMINO (ALT) (SGPT): CPT | Performed by: INTERNAL MEDICINE

## 2019-06-27 PROCEDURE — 80061 LIPID PANEL: CPT | Performed by: INTERNAL MEDICINE

## 2019-06-27 PROCEDURE — 80048 BASIC METABOLIC PNL TOTAL CA: CPT | Performed by: INTERNAL MEDICINE

## 2019-06-27 NOTE — PROGRESS NOTES
Lab orders(BMP, ALT, lipids) entered for 7/1 appt ashley/Óscar.  Kriss Dietrich RN on 6/27/2019 at 8:59 AM

## 2019-07-01 ENCOUNTER — OFFICE VISIT (OUTPATIENT)
Dept: CARDIOLOGY | Facility: CLINIC | Age: 79
End: 2019-07-01
Payer: COMMERCIAL

## 2019-07-01 VITALS
DIASTOLIC BLOOD PRESSURE: 84 MMHG | HEART RATE: 78 BPM | WEIGHT: 231 LBS | BODY MASS INDEX: 32.34 KG/M2 | SYSTOLIC BLOOD PRESSURE: 136 MMHG | HEIGHT: 71 IN

## 2019-07-01 DIAGNOSIS — I10 ESSENTIAL HYPERTENSION WITH GOAL BLOOD PRESSURE LESS THAN 140/90: ICD-10-CM

## 2019-07-01 DIAGNOSIS — E78.00 PURE HYPERCHOLESTEROLEMIA: ICD-10-CM

## 2019-07-01 DIAGNOSIS — J44.9 CHRONIC OBSTRUCTIVE PULMONARY DISEASE, UNSPECIFIED COPD TYPE (H): ICD-10-CM

## 2019-07-01 DIAGNOSIS — I25.118 CORONARY ARTERY DISEASE INVOLVING NATIVE CORONARY ARTERY OF NATIVE HEART WITH OTHER FORM OF ANGINA PECTORIS (H): ICD-10-CM

## 2019-07-01 PROCEDURE — 99214 OFFICE O/P EST MOD 30 MIN: CPT | Performed by: INTERNAL MEDICINE

## 2019-07-01 RX ORDER — NIACIN 100 MG
100 TABLET ORAL DAILY
COMMUNITY
End: 2019-07-01

## 2019-07-01 RX ORDER — ATORVASTATIN CALCIUM 80 MG/1
80 TABLET, FILM COATED ORAL AT BEDTIME
Qty: 90 TABLET | Refills: 3 | Status: SHIPPED | OUTPATIENT
Start: 2019-07-01 | End: 2020-08-12

## 2019-07-01 ASSESSMENT — MIFFLIN-ST. JEOR: SCORE: 1779.94

## 2019-07-01 NOTE — PROGRESS NOTES
Service Date: 07/01/2019      HISTORY OF PRESENT ILLNESS:  I had the opportunity to see Mr. Romel Bonilla in Cardiology Clinic today at the [facility] in [place] for re-evaluation of coronary artery disease and cardiac risk factors including hypercholesterolemia and hypertension.  Mr. Bonilla is an 78-year-old gentleman who underwent angioplasty and stenting of the ostial right coronary artery in 03/2016.  He has been on Brilinta 90 mg p.o. b.i.d. since then because of an allergy to aspirin and Plavix both.      He has been feeling pretty well.  He has not been experiencing any chest discomfort, shortness of breath, palpitations, lightheadedness or syncope.  His recent echocardiogram on 06/21/2019 was normal.  There has been a mention in the past of dilation of the ascending aorta, but his aorta measures 3.6 cm which is quite normal in size.  His aortic valve function normally is well with no mention of regurgitation.  His laboratory studies look good.  His basic metabolic panel was normal and his HDL was good at 63.  Unfortunately, his LDL has gone up a bit further to 94.  He also has a fasting glucose level of 130.  He has been taking atorvastatin 40 mg a day.  He tells me that he had been on 80 mg a day in the past, but wanted to take less because he had heard about some concerning side effects with that medication.  He has not had any problems with it himself.      PHYSICAL EXAMINATION:  His blood pressure was 136/84, heart rate 78 and weight 231 pounds.  His lungs are clear.  His heart rhythm is regular.  He has no cardiac murmurs or carotid bruits.      IMPRESSIONS:  Mr. Romel Bonilla is an 78-year-old gentleman with a history of coronary artery disease including stenting of the right coronary artery in 2016.  He reportedly has hypertension, but is on no medications for that.  His blood pressure looks normal.  I do not think he has true diagnosis of hypertension.  He does have  dyslipidemia and his LDL is not optimally controlled.  I will increase his atorvastatin to 80 mg a day.  I will have him follow up with you to continue discussions regarding his mildly elevated fasting glucose.      I am pleased that he is doing well.  I reviewed his echo results with him which looked normal.  I will plan to have him come back and see me again in 1 year for re-evaluation.      cc:   Zac [place] [address]         JULIA BOND MD, [facility]             D: 2019   T: 2019   MT: VIVI      Name:     WESLEY SPARKS   MRN:      [id number]        Account:      [id number]   :      1940           Service Date: 2019      Document: [id number]

## 2019-07-01 NOTE — PROGRESS NOTES
HPI and Plan:   See dictation    Orders Placed This Encounter   Procedures     Basic metabolic panel     Lipid Profile     ALT     Follow-Up with Cardiologist       Orders Placed This Encounter   Medications     Multiple Vitamins-Minerals (MULTIVITAL PO)     Sig: Take 1 tablet by mouth daily     DISCONTD: niacin 100 MG tablet     Sig: Take 100 mg by mouth daily     ticagrelor (BRILINTA) 90 MG tablet     Sig: Take 1 tablet (90 mg) by mouth 2 times daily     Dispense:  180 tablet     Refill:  3     atorvastatin (LIPITOR) 80 MG tablet     Sig: Take 1 tablet (80 mg) by mouth At Bedtime     Dispense:  90 tablet     Refill:  3       Medications Discontinued During This Encounter   Medication Reason     ciprofloxacin (CIPRO) 500 MG tablet Therapy completed     niacin 100 MG tablet      ticagrelor (BRILINTA) 90 MG tablet Reorder     atorvastatin (LIPITOR) 40 MG tablet          Encounter Diagnoses   Name Primary?     Essential hypertension with goal blood pressure less than 140/90      Coronary artery disease involving native coronary artery of native heart with other form of angina pectoris (H)      Pure hypercholesterolemia      Chronic obstructive pulmonary disease, unspecified COPD type (H)        CURRENT MEDICATIONS:  Current Outpatient Medications   Medication Sig Dispense Refill     Acetaminophen (TYLENOL PO) Take by mouth every 6 hours as needed for mild pain or fever       atorvastatin (LIPITOR) 80 MG tablet Take 1 tablet (80 mg) by mouth At Bedtime 90 tablet 3     cetirizine (ZYRTEC) 10 MG tablet Take 1 tablet (10 mg) by mouth every evening 30 tablet 1     cinnamon 500 MG CAPS Take 1 capsule by mouth daily       FLOVENT DISKUS 100 MCG/BLIST inhaler INHALE 1 PUFF TWICE DAILY 60 each 0     fluticasone (FLONASE) 50 MCG/ACT spray Spray 1-2 sprays into both nostrils daily 1 Bottle 3     Multiple Vitamins-Minerals (MULTIVITAL PO) Take 1 tablet by mouth daily       ticagrelor (BRILINTA) 90 MG tablet Take 1 tablet (90 mg)  by mouth 2 times daily 180 tablet 3     guaiFENesin (MUCINEX) 600 MG 12 hr tablet Take 1 tablet (600 mg) by mouth 2 times daily 20 tablet 0     nitroglycerin (NITROSTAT) 0.4 MG SL tablet Place 1 tablet (0.4 mg) under the tongue every 5 minutes as needed for chest pain if you are still having symptoms after 3 doses (15 minutes) call 911. (Patient not taking: Reported on 9/14/2018) 25 tablet 1     PROAIR  (90 Base) MCG/ACT inhaler INHALE 2 PUFFS INTO THE LUNGS EVERY 4 HOURS AS NEEDED FOR SHORTNESS OF BREATH OR DIFFICULT BREATHING OR WHEEZING (Patient not taking: Reported on 7/1/2019) 8.5 g 1       ALLERGIES     Allergies   Allergen Reactions     Cefprozil Diarrhea     Aspirin      Urinary retention     Plavix [Clopidogrel] Itching       PAST MEDICAL HISTORY:  Past Medical History:   Diagnosis Date     Anxiety      Ascending aorta dilatation (H)      Asthma     Dr. Lennox     COPD (chronic obstructive pulmonary disease) (H)     bronchiectasis on multiple inhalers      Coronary artery disease     Cath 3/14/16- 95% ostail RCA stenosis>>CECILIO placed, fu nuclear est 5/17 nl     Essential hypertension, benign     off medications with life style     GERD (gastroesophageal reflux disease)      Groin hematoma     right- 3/2016 post angio     Gross hematuria 09/2018    ct neg, Dr. Gentile     Hoarseness      Hyperglycemia      Hyperlipidemia LDL goal <70      Lung nodule 2012    indeterminate-3 mm-needs 12 month follow up; fu done 3/16 at [facility] and benign, no fu needed     Obesity, unspecified      Osteoarthrosis, unspecified whether generalized or localized, unspecified site      Prostate CA (H) 2012    metastatic-guerita's score 7, seeds and xrt, now seeing Dr. Gentile     Urinary retention 2016    due to asa       PAST SURGICAL HISTORY:  Past Surgical History:   Procedure Laterality Date     C NONSPECIFIC PROCEDURE  7/01    Rt knee arthroscopy and menisectomy     C NONSPECIFIC PROCEDURE  2000    RT HERNIA REPAIR      CATARACT IOL, RT/LT  2016     CYSTOSCOPY       CYSTOSCOPY, DILATE URETHRA, COMBINED N/A 2018    Procedure: COMBINED CYSTOSCOPY, DILATE URETHRA;  VIDEO CYSTOSCOPY WITH GASCA DILATION , WITH EXTRACTION OF  PROSTATIC STONE. CAUTERY OF PROASTATIC VEINS  ;  Surgeon: Ti Gentile MD;  Location: [facility] OR     HEART CATH LEFT HEART CATH  3/14/16    95% ostail RCA stenosis>>CECILIO placed     INSERT RADIATION SEEDS PROSTATE  7/10/2012    seeds and xrt     ROTATOR CUFF REPAIR RT/LT         FAMILY HISTORY:  Family History   Problem Relation Age of Onset     Myocardial Infarction Mother      Other - See Comments Mother         Angina     Myocardial Infarction Father      Arthritis Father      Family History Negative Sister      Lupus Sister      Obesity Sister      Cancer Brother      Family History Negative Son      Family History Negative Son      Family History Negative Son      Family History Negative Son      Unknown/Adopted Maternal Grandmother      Myocardial Infarction Maternal Grandfather         40s or 50s     Unknown/Adopted Paternal Grandmother      Unknown/Adopted Paternal Grandfather        SOCIAL HISTORY:  Social History     Socioeconomic History     Marital status:      Spouse name: None     Number of children: 4     Years of education: None     Highest education level: None   Occupational History     Occupation: sold insurance   Social Needs     Financial resource strain: None     Food insecurity:     Worry: None     Inability: None     Transportation needs:     Medical: None     Non-medical: None   Tobacco Use     Smoking status: Former Smoker     Packs/day: 1.00     Years: 20.00     Pack years: 20.00     Types: Cigarettes     Last attempt to quit: 1994     Years since quittin.4     Smokeless tobacco: Never Used   Substance and Sexual Activity     Alcohol use: Yes     Alcohol/week: 0.0 oz     Comment: occ beer     Drug use: No     Sexual activity: Yes     Partners: Female   Lifestyle  "    Physical activity:     Days per week: None     Minutes per session: None     Stress: None   Relationships     Social connections:     Talks on phone: None     Gets together: None     Attends Yarsanism service: None     Active member of club or organization: None     Attends meetings of clubs or organizations: None     Relationship status: None     Intimate partner violence:     Fear of current or ex partner: None     Emotionally abused: None     Physically abused: None     Forced sexual activity: None   Other Topics Concern     Parent/sibling w/ CABG, MI or angioplasty before 65F 55M? Not Asked      Service Not Asked     Blood Transfusions Not Asked     Caffeine Concern No     Comment: occ      Occupational Exposure Not Asked     Hobby Hazards Not Asked     Sleep Concern Yes     Comment: due to wife passing away a month ago     Stress Concern No     Weight Concern No     Special Diet No     Back Care Not Asked     Exercise No     Comment: not currently, starting cardiac rehab soon      Bike Helmet Not Asked     Seat Belt Yes     Self-Exams Not Asked   Social History Narrative     None       Review of Systems:  Skin:  Negative       Eyes:  Negative      ENT:  Positive for nasal congestion;sinus trouble occassional  Respiratory:  Negative       Cardiovascular:  Negative      Gastroenterology: Negative reflux occ since last OV   Genitourinary:  Negative      Musculoskeletal:  Positive for arthritis    Neurologic:  Negative      Psychiatric:  Negative      Heme/Lymph/Imm:  Positive for allergies    Endocrine:  Negative        Physical Exam:  Vitals: /84   Pulse 78   Ht 1.803 m (5' 11\")   Wt 104.8 kg (231 lb)   BMI 32.22 kg/m      Constitutional:  cooperative, alert and oriented, well developed, well nourished, in no acute distress        Skin:  warm and dry to the touch, no apparent skin lesions or masses noted          Head:  normocephalic, no masses or lesions        Eyes:  pupils equal and " round, conjunctivae and lids unremarkable, sclera white, no xanthalasma, EOMS intact, no nystagmus        Lymph:      ENT:  no pallor or cyanosis, dentition good        Neck:  carotid pulses are full and equal bilaterally;no carotid bruit        Respiratory:  clear to auscultation;normal symmetry         Cardiac: regular rhythm;normal S1 and S2                                                         GI:  abdomen soft;BS normoactive        Extremities and Muscular Skeletal:  no deformities, clubbing, cyanosis, erythema observed       LLE edema;trace venous insufficiency    Neurological:  no gross motor deficits;affect appropriate        Psych:  Alert and Oriented x 3        CC  Awais Mathew MD  [id number]  [place] [address]

## 2019-07-01 NOTE — LETTER
7/1/2019    Zac Martínez MD  [address]  [place] [address]    RE: Romel Bonilla       Dear Colleague,    I had the pleasure of seeing Romel Bonilla in the [facility].    HPI and Plan:   See dictation    Orders Placed This Encounter   Procedures     Basic metabolic panel     Lipid Profile     ALT     Follow-Up with Cardiologist       Orders Placed This Encounter   Medications     Multiple Vitamins-Minerals (MULTIVITAL PO)     Sig: Take 1 tablet by mouth daily     DISCONTD: niacin 100 MG tablet     Sig: Take 100 mg by mouth daily     ticagrelor (BRILINTA) 90 MG tablet     Sig: Take 1 tablet (90 mg) by mouth 2 times daily     Dispense:  180 tablet     Refill:  3     atorvastatin (LIPITOR) 80 MG tablet     Sig: Take 1 tablet (80 mg) by mouth At Bedtime     Dispense:  90 tablet     Refill:  3       Medications Discontinued During This Encounter   Medication Reason     ciprofloxacin (CIPRO) 500 MG tablet Therapy completed     niacin 100 MG tablet      ticagrelor (BRILINTA) 90 MG tablet Reorder     atorvastatin (LIPITOR) 40 MG tablet          Encounter Diagnoses   Name Primary?     Essential hypertension with goal blood pressure less than 140/90      Coronary artery disease involving native coronary artery of native heart with other form of angina pectoris (H)      Pure hypercholesterolemia      Chronic obstructive pulmonary disease, unspecified COPD type (H)        CURRENT MEDICATIONS:  Current Outpatient Medications   Medication Sig Dispense Refill     Acetaminophen (TYLENOL PO) Take by mouth every 6 hours as needed for mild pain or fever       atorvastatin (LIPITOR) 80 MG tablet Take 1 tablet (80 mg) by mouth At Bedtime 90 tablet 3     cetirizine (ZYRTEC) 10 MG tablet Take 1 tablet (10 mg) by mouth every evening 30 tablet 1     cinnamon 500 MG CAPS Take 1 capsule by mouth daily       FLOVENT DISKUS 100 MCG/BLIST inhaler INHALE 1 PUFF TWICE DAILY 60 each 0      fluticasone (FLONASE) 50 MCG/ACT spray Spray 1-2 sprays into both nostrils daily 1 Bottle 3     Multiple Vitamins-Minerals (MULTIVITAL PO) Take 1 tablet by mouth daily       ticagrelor (BRILINTA) 90 MG tablet Take 1 tablet (90 mg) by mouth 2 times daily 180 tablet 3     guaiFENesin (MUCINEX) 600 MG 12 hr tablet Take 1 tablet (600 mg) by mouth 2 times daily 20 tablet 0     nitroglycerin (NITROSTAT) 0.4 MG SL tablet Place 1 tablet (0.4 mg) under the tongue every 5 minutes as needed for chest pain if you are still having symptoms after 3 doses (15 minutes) call 911. (Patient not taking: Reported on 9/14/2018) 25 tablet 1     PROAIR  (90 Base) MCG/ACT inhaler INHALE 2 PUFFS INTO THE LUNGS EVERY 4 HOURS AS NEEDED FOR SHORTNESS OF BREATH OR DIFFICULT BREATHING OR WHEEZING (Patient not taking: Reported on 7/1/2019) 8.5 g 1       ALLERGIES     Allergies   Allergen Reactions     Cefprozil Diarrhea     Aspirin      Urinary retention     Plavix [Clopidogrel] Itching       PAST MEDICAL HISTORY:  Past Medical History:   Diagnosis Date     Anxiety      Ascending aorta dilatation (H)      Asthma     Dr. Lennox     COPD (chronic obstructive pulmonary disease) (H)     bronchiectasis on multiple inhalers      Coronary artery disease     Cath 3/14/16- 95% ostail RCA stenosis>>CECILIO placed, fu nuclear est 5/17 nl     Essential hypertension, benign     off medications with life style     GERD (gastroesophageal reflux disease)      Groin hematoma     right- 3/2016 post angio     Gross hematuria 09/2018    ct neg, Dr. Gentile     Hoarseness      Hyperglycemia      Hyperlipidemia LDL goal <70      Lung nodule 2012    indeterminate-3 mm-needs 12 month follow up; fu done 3/16 [facility] and benign, no fu needed     Obesity, unspecified      Osteoarthrosis, unspecified whether generalized or localized, unspecified site      Prostate CA (H) 2012    metastatic-guerita's score 7, seeds and xrt, now seeing Dr. Gentile     Urinary retention  2016    due to asa       PAST SURGICAL HISTORY:  Past Surgical History:   Procedure Laterality Date     C NONSPECIFIC PROCEDURE  7/01    Rt knee arthroscopy and menisectomy     C NONSPECIFIC PROCEDURE  2000    RT HERNIA REPAIR     CATARACT IOL, RT/LT  2016     CYSTOSCOPY       CYSTOSCOPY, DILATE URETHRA, COMBINED N/A 9/19/2018    Procedure: COMBINED CYSTOSCOPY, DILATE URETHRA;  VIDEO CYSTOSCOPY WITH GASCA DILATION , WITH EXTRACTION OF  PROSTATIC STONE. CAUTERY OF PROASTATIC VEINS  ;  Surgeon: Ti Gentile MD;  Location: [facility] OR     HEART CATH LEFT HEART CATH  3/14/16    95% ostail RCA stenosis>>CECILIO placed     INSERT RADIATION SEEDS PROSTATE  7/10/2012    seeds and xrt     ROTATOR CUFF REPAIR RT/LT  2016       FAMILY HISTORY:  Family History   Problem Relation Age of Onset     Myocardial Infarction Mother      Other - See Comments Mother         Angina     Myocardial Infarction Father      Arthritis Father      Family History Negative Sister      Lupus Sister      Obesity Sister      Cancer Brother      Family History Negative Son      Family History Negative Son      Family History Negative Son      Family History Negative Son      Unknown/Adopted Maternal Grandmother      Myocardial Infarction Maternal Grandfather         40s or 50s     Unknown/Adopted Paternal Grandmother      Unknown/Adopted Paternal Grandfather        SOCIAL HISTORY:  Social History     Socioeconomic History     Marital status:      Spouse name: None     Number of children: 4     Years of education: None     Highest education level: None   Occupational History     Occupation: sold insurance   Social Needs     Financial resource strain: None     Food insecurity:     Worry: None     Inability: None     Transportation needs:     Medical: None     Non-medical: None   Tobacco Use     Smoking status: Former Smoker     Packs/day: 1.00     Years: 20.00     Pack years: 20.00     Types: Cigarettes     Last attempt to quit: 1/26/1994     Years  "since quittin.4     Smokeless tobacco: Never Used   Substance and Sexual Activity     Alcohol use: Yes     Alcohol/week: 0.0 oz     Comment: occ beer     Drug use: No     Sexual activity: Yes     Partners: Female   Lifestyle     Physical activity:     Days per week: None     Minutes per session: None     Stress: None   Relationships     Social connections:     Talks on phone: None     Gets together: None     Attends Restorationist service: None     Active member of club or organization: None     Attends meetings of clubs or organizations: None     Relationship status: None     Intimate partner violence:     Fear of current or ex partner: None     Emotionally abused: None     Physically abused: None     Forced sexual activity: None   Other Topics Concern     Parent/sibling w/ CABG, MI or angioplasty before 65F 55M? Not Asked      Service Not Asked     Blood Transfusions Not Asked     Caffeine Concern No     Comment: occ      Occupational Exposure Not Asked     Hobby Hazards Not Asked     Sleep Concern Yes     Comment: due to wife passing away a month ago     Stress Concern No     Weight Concern No     Special Diet No     Back Care Not Asked     Exercise No     Comment: not currently, starting cardiac rehab soon      Bike Helmet Not Asked     Seat Belt Yes     Self-Exams Not Asked   Social History Narrative     None       Review of Systems:  Skin:  Negative       Eyes:  Negative      ENT:  Positive for nasal congestion;sinus trouble occassional  Respiratory:  Negative       Cardiovascular:  Negative      Gastroenterology: Negative reflux occ since last OV   Genitourinary:  Negative      Musculoskeletal:  Positive for arthritis    Neurologic:  Negative      Psychiatric:  Negative      Heme/Lymph/Imm:  Positive for allergies    Endocrine:  Negative        Physical Exam:  Vitals: /84   Pulse 78   Ht 1.803 m (5' 11\")   Wt 104.8 kg (231 lb)   BMI 32.22 kg/m       Constitutional:  cooperative, alert and " oriented, well developed, well nourished, in no acute distress        Skin:  warm and dry to the touch, no apparent skin lesions or masses noted          Head:  normocephalic, no masses or lesions        Eyes:  pupils equal and round, conjunctivae and lids unremarkable, sclera white, no xanthalasma, EOMS intact, no nystagmus        Lymph:      ENT:  no pallor or cyanosis, dentition good        Neck:  carotid pulses are full and equal bilaterally;no carotid bruit        Respiratory:  clear to auscultation;normal symmetry         Cardiac: regular rhythm;normal S1 and S2                                                         GI:  abdomen soft;BS normoactive        Extremities and Muscular Skeletal:  no deformities, clubbing, cyanosis, erythema observed       LLE edema;trace venous insufficiency    Neurological:  no gross motor deficits;affect appropriate        Psych:  Alert and Oriented x 3        CC  Awais Mathew MD  [address]  [place] [address]                Thank you for allowing me to participate in the care of your patient.      Sincerely,     JULIA BOND MD     [facility]    cc:   Awais Mathew MD  [address]  [place] [address]

## 2019-07-01 NOTE — LETTER
7/1/2019      Zac Martínez MD  [address] [facility] [address]  [facility] [address]      RE: Romel TERAN [place]       Dear Colleague,    I had the pleasure of seeing Romel Bonilla in the [facility].    Service Date: 07/01/2019      HISTORY OF PRESENT ILLNESS:  I had the opportunity to see Mr. Romel Bonilla in Cardiology Clinic today at the [facility] in [place] for re-evaluation of coronary artery disease and cardiac risk factors including hypercholesterolemia and hypertension.  Mr. Bonilla is an 78-year-old gentleman who underwent angioplasty and stenting of the ostial right coronary artery in 03/2016.  He has been on Brilinta 90 mg p.o. b.i.d. since then because of an allergy to aspirin and Plavix both.      He has been feeling pretty well.  He has not been experiencing any chest discomfort, shortness of breath, palpitations, lightheadedness or syncope.  His recent echocardiogram on 06/21/2019 was normal.  There has been a mention in the past of dilation of the ascending aorta, but his aorta measures 3.6 cm which is quite normal in size.  His aortic valve function normally is well with no mention of regurgitation.  His laboratory studies look good.  His basic metabolic panel was normal and his HDL was good at 63.  Unfortunately, his LDL has gone up a bit further to 94.  He also has a fasting glucose level of 130.  He has been taking atorvastatin 40 mg a day.  He tells me that he had been on 80 mg a day in the past, but wanted to take less because he had heard about some concerning side effects with that medication.  He has not had any problems with it himself.      PHYSICAL EXAMINATION:  His blood pressure was 136/84, heart rate 78 and weight 231 pounds.  His lungs are clear.  His heart rhythm is regular.  He has no cardiac murmurs or carotid bruits.      IMPRESSIONS:  Mr. Romel Bonilla is an 78-year-old gentleman with a history of coronary artery disease  including stenting of the right coronary artery in 2016.  He reportedly has hypertension, but is on no medications for that.  His blood pressure looks normal.  I do not think he has true diagnosis of hypertension.  He does have dyslipidemia and his LDL is not optimally controlled.  I will increase his atorvastatin to 80 mg a day.  I will have him follow up with you to continue discussions regarding his mildly elevated fasting glucose.      I am pleased that he is doing well.  I reviewed his echo results with him which looked normal.  I will plan to have him come back and see me again in 1 year for re-evaluation.      cc:   Zac [place] [address]         JULIA BOND MD, Located within [facility]             D: 2019   T: 2019   MT: VIVI      Name:     WESLEY SPARKS   MRN:      [id number]        Account:      [id number]   :      1940           Service Date: 2019      Document: [id number]         Outpatient Encounter Medications as of 2019   Medication Sig Dispense Refill     Acetaminophen (TYLENOL PO) Take by mouth every 6 hours as needed for mild pain or fever       atorvastatin (LIPITOR) 80 MG tablet Take 1 tablet (80 mg) by mouth At Bedtime 90 tablet 3     cetirizine (ZYRTEC) 10 MG tablet Take 1 tablet (10 mg) by mouth every evening 30 tablet 1     cinnamon 500 MG CAPS Take 1 capsule by mouth daily       FLOVENT DISKUS 100 MCG/BLIST inhaler INHALE 1 PUFF TWICE DAILY 60 each 0     fluticasone (FLONASE) 50 MCG/ACT spray Spray 1-2 sprays into both nostrils daily 1 Bottle 3     Multiple Vitamins-Minerals (MULTIVITAL PO) Take 1 tablet by mouth daily       ticagrelor (BRILINTA) 90 MG tablet Take 1 tablet (90 mg) by mouth 2 times daily 180 tablet 3     guaiFENesin (MUCINEX) 600 MG 12 hr tablet Take 1 tablet (600 mg) by mouth 2 times daily 20 tablet 0     nitroglycerin (NITROSTAT) 0.4 MG SL tablet Place 1 tablet (0.4 mg)  under the tongue every 5 minutes as needed for chest pain if you are still having symptoms after 3 doses (15 minutes) call 911. (Patient not taking: Reported on 9/14/2018) 25 tablet 1     PROAIR  (90 Base) MCG/ACT inhaler INHALE 2 PUFFS INTO THE LUNGS EVERY 4 HOURS AS NEEDED FOR SHORTNESS OF BREATH OR DIFFICULT BREATHING OR WHEEZING (Patient not taking: Reported on 7/1/2019) 8.5 g 1     [DISCONTINUED] atorvastatin (LIPITOR) 40 MG tablet Take 1 tablet (40 mg) by mouth At Bedtime 90 tablet 3     [DISCONTINUED] ciprofloxacin (CIPRO) 500 MG tablet Take 1 tablet (500 mg) by mouth 2 times daily (Patient not taking: Reported on 7/1/2019) 6 tablet 0     [DISCONTINUED] niacin 100 MG tablet Take 100 mg by mouth daily       [DISCONTINUED] ticagrelor (BRILINTA) 90 MG tablet Take 1 tablet (90 mg) by mouth 2 times daily 180 tablet 1     No facility-administered encounter medications on file as of 7/1/2019.        Again, thank you for allowing me to participate in the care of your patient.      Sincerely,    JULIA BOND MD     [facility]

## 2019-07-11 ENCOUNTER — OFFICE VISIT (OUTPATIENT)
Dept: FAMILY MEDICINE | Facility: CLINIC | Age: 79
End: 2019-07-11
Payer: COMMERCIAL

## 2019-07-11 VITALS
WEIGHT: 232 LBS | HEART RATE: 79 BPM | SYSTOLIC BLOOD PRESSURE: 136 MMHG | TEMPERATURE: 98 F | HEIGHT: 71 IN | BODY MASS INDEX: 32.48 KG/M2 | DIASTOLIC BLOOD PRESSURE: 78 MMHG | OXYGEN SATURATION: 98 %

## 2019-07-11 DIAGNOSIS — I77.810 THORACIC AORTIC ECTASIA (H): ICD-10-CM

## 2019-07-11 DIAGNOSIS — E78.00 PURE HYPERCHOLESTEROLEMIA: ICD-10-CM

## 2019-07-11 DIAGNOSIS — Z00.00 ROUTINE GENERAL MEDICAL EXAMINATION AT A HEALTH CARE FACILITY: Primary | ICD-10-CM

## 2019-07-11 DIAGNOSIS — K21.9 GASTROESOPHAGEAL REFLUX DISEASE WITHOUT ESOPHAGITIS: ICD-10-CM

## 2019-07-11 DIAGNOSIS — C61 PROSTATE CA (H): ICD-10-CM

## 2019-07-11 DIAGNOSIS — I10 BENIGN ESSENTIAL HYPERTENSION: ICD-10-CM

## 2019-07-11 DIAGNOSIS — I77.810 ASCENDING AORTA DILATATION (H): ICD-10-CM

## 2019-07-11 DIAGNOSIS — R73.9 HYPERGLYCEMIA: ICD-10-CM

## 2019-07-11 DIAGNOSIS — J45.30 MILD PERSISTENT ASTHMA WITHOUT COMPLICATION: ICD-10-CM

## 2019-07-11 DIAGNOSIS — R05.9 COUGH: ICD-10-CM

## 2019-07-11 DIAGNOSIS — J44.9 CHRONIC OBSTRUCTIVE PULMONARY DISEASE, UNSPECIFIED COPD TYPE (H): ICD-10-CM

## 2019-07-11 DIAGNOSIS — I25.10 CORONARY ARTERY DISEASE INVOLVING NATIVE CORONARY ARTERY OF NATIVE HEART WITHOUT ANGINA PECTORIS: ICD-10-CM

## 2019-07-11 LAB
ERYTHROCYTE [DISTWIDTH] IN BLOOD BY AUTOMATED COUNT: 13.2 % (ref 10–15)
HBA1C MFR BLD: 5.6 % (ref 0–5.6)
HCT VFR BLD AUTO: 40.9 % (ref 40–53)
HGB BLD-MCNC: 14.1 G/DL (ref 13.3–17.7)
MCH RBC QN AUTO: 33.2 PG (ref 26.5–33)
MCHC RBC AUTO-ENTMCNC: 34.5 G/DL (ref 31.5–36.5)
MCV RBC AUTO: 96 FL (ref 78–100)
PLATELET # BLD AUTO: 228 10E9/L (ref 150–450)
RBC # BLD AUTO: 4.25 10E12/L (ref 4.4–5.9)
WBC # BLD AUTO: 4.8 10E9/L (ref 4–11)

## 2019-07-11 PROCEDURE — 36415 COLL VENOUS BLD VENIPUNCTURE: CPT | Performed by: INTERNAL MEDICINE

## 2019-07-11 PROCEDURE — 99397 PER PM REEVAL EST PAT 65+ YR: CPT | Performed by: INTERNAL MEDICINE

## 2019-07-11 PROCEDURE — 99207 C PAF COMPLETED  NO CHARGE: CPT | Mod: 25 | Performed by: INTERNAL MEDICINE

## 2019-07-11 PROCEDURE — 83036 HEMOGLOBIN GLYCOSYLATED A1C: CPT | Performed by: INTERNAL MEDICINE

## 2019-07-11 PROCEDURE — 85027 COMPLETE CBC AUTOMATED: CPT | Performed by: INTERNAL MEDICINE

## 2019-07-11 RX ORDER — FLUTICASONE PROPIONATE AND SALMETEROL XINAFOATE 45; 21 UG/1; UG/1
2 AEROSOL, METERED RESPIRATORY (INHALATION) 2 TIMES DAILY
Qty: 3 INHALER | Refills: 3 | Status: SHIPPED | OUTPATIENT
Start: 2019-07-11 | End: 2020-08-27

## 2019-07-11 ASSESSMENT — MIFFLIN-ST. JEOR: SCORE: 1784.48

## 2019-07-11 ASSESSMENT — ACTIVITIES OF DAILY LIVING (ADL): CURRENT_FUNCTION: NO ASSISTANCE NEEDED

## 2019-07-11 NOTE — PATIENT INSTRUCTIONS
Monitor your blood pressure and let me know if it is not around 130/80    Change the flovent to the advair and let's see if this helps the cough    I would recommend getting the new shingles shot called shingrix, but I would do it at your pharmacy as they can check with the insurance company to see if it is paid for.    Zac Martínez M.D.

## 2019-07-11 NOTE — PROGRESS NOTES
SUBJECTIVE:   Romel Bonilla is a 78 year old male who presents for Preventive Visit.    The patient is here with his significant other for a physical.  He has a couple of issues to go over.    The patient recently saw cardiology who raised his Lipitor dose.  No other changes were made and his echocardiogram was normal with no signs of aortic dilatation.  He does work out some.    The patient has had a cough for a long time.  Occasionally he can get short of breath but this is not new or changed.  He uses Flovent but does not really use albuterol.  He does not have chest pain.    The patient had hematuria last year and was seen by his urologist.  He sees him yearly for follow-up on his prostate cancer.    Occasionally he has acid reflux if he eats the wrong thing.  No dysphasia.    He otherwise feels fine.               Past Medical History:      Past Medical History:   Diagnosis Date     Anxiety      Ascending aorta dilatation (H)     echo 7/19 did not show it, nl echo     Asthma     Dr. Lennox     COPD (chronic obstructive pulmonary disease) (H)     bronchiectasis on multiple inhalers      Coronary artery disease     Cath 3/14/16- 95% ostail RCA stenosis>>CECILIO placed, fu nuclear est 5/17 nl     Essential hypertension, benign     off medications with life style     GERD (gastroesophageal reflux disease)      Groin hematoma     right- 3/2016 post angio     Gross hematuria 09/2018    ct neg, Dr. Gentile     Hoarseness      Hyperglycemia      Hyperlipidemia LDL goal <70      Lung nodule 2012    indeterminate-3 mm-needs 12 month follow up; fu done 3/16 at [facility] and benign, no fu needed     Obesity, unspecified      Osteoarthrosis, unspecified whether generalized or localized, unspecified site      Prostate CA (H) 2012    metastatic-guerita's score 7, seeds and xrt, now seeing Dr. Gentile     Urinary retention 2016    due to asa             Past Surgical History:      Past Surgical History:   Procedure Laterality  Date     C NONSPECIFIC PROCEDURE      Rt knee arthroscopy and menisectomy     C NONSPECIFIC PROCEDURE      RT HERNIA REPAIR     CATARACT IOL, RT/LT  2016     CYSTOSCOPY       CYSTOSCOPY, DILATE URETHRA, COMBINED N/A 2018    Procedure: COMBINED CYSTOSCOPY, DILATE URETHRA;  VIDEO CYSTOSCOPY WITH GASCA DILATION , WITH EXTRACTION OF  PROSTATIC STONE. CAUTERY OF PROASTATIC VEINS  ;  Surgeon: Ti Gentile MD;  Location: [facility] OR     HEART CATH LEFT HEART CATH  3/14/16    95% ostail RCA stenosis>>CECILIO placed     INSERT RADIATION SEEDS PROSTATE  7/10/2012    seeds and xrt     ROTATOR CUFF REPAIR RT/LT               Social History:     Social History     Socioeconomic History     Marital status:      Spouse name: Not on file     Number of children: 4     Years of education: Not on file     Highest education level: Not on file   Occupational History     Occupation: sold insurance   Social Needs     Financial resource strain: Not on file     Food insecurity:     Worry: Not on file     Inability: Not on file     Transportation needs:     Medical: Not on file     Non-medical: Not on file   Tobacco Use     Smoking status: Former Smoker     Packs/day: 1.00     Years: 20.00     Pack years: 20.00     Types: Cigarettes     Last attempt to quit: 1994     Years since quittin.4     Smokeless tobacco: Never Used   Substance and Sexual Activity     Alcohol use: Yes     Alcohol/week: 0.0 oz     Comment: occ beer     Drug use: No     Sexual activity: Yes     Partners: Female   Lifestyle     Physical activity:     Days per week: Not on file     Minutes per session: Not on file     Stress: Not on file   Relationships     Social connections:     Talks on phone: Not on file     Gets together: Not on file     Attends Spiritism service: Not on file     Active member of club or organization: Not on file     Attends meetings of clubs or organizations: Not on file     Relationship status: Not on file      Intimate partner violence:     Fear of current or ex partner: Not on file     Emotionally abused: Not on file     Physically abused: Not on file     Forced sexual activity: Not on file   Other Topics Concern     Parent/sibling w/ CABG, MI or angioplasty before 65F 55M? Not Asked      Service Not Asked     Blood Transfusions Not Asked     Caffeine Concern No     Comment: occ      Occupational Exposure Not Asked     Hobby Hazards Not Asked     Sleep Concern Yes     Comment: due to wife passing away a month ago     Stress Concern No     Weight Concern No     Special Diet No     Back Care Not Asked     Exercise No     Comment: not currently, starting cardiac rehab soon      Bike Helmet Not Asked     Seat Belt Yes     Self-Exams Not Asked   Social History Narrative     Not on file             Family History:   reviewed         Allergies:     Allergies   Allergen Reactions     Cefprozil Diarrhea     Aspirin      Urinary retention     Plavix [Clopidogrel] Itching             Medications:     Current Outpatient Medications   Medication Sig Dispense Refill     Acetaminophen (TYLENOL PO) Take by mouth every 6 hours as needed for mild pain or fever       ASPIRIN NOT PRESCRIBED (INTENTIONAL) Please choose reason not prescribed, below       atorvastatin (LIPITOR) 80 MG tablet Take 1 tablet (80 mg) by mouth At Bedtime 90 tablet 3     cetirizine (ZYRTEC) 10 MG tablet Take 1 tablet (10 mg) by mouth every evening 30 tablet 1     cinnamon 500 MG CAPS Take 1 capsule by mouth daily       fluticasone (FLONASE) 50 MCG/ACT spray Spray 1-2 sprays into both nostrils daily 1 Bottle 3     fluticasone-salmeterol (ADVAIR-HFA) 45-21 MCG/ACT inhaler Inhale 2 puffs into the lungs 2 times daily 3 Inhaler 3     guaiFENesin (MUCINEX) 600 MG 12 hr tablet Take 1 tablet (600 mg) by mouth 2 times daily 20 tablet 0     Multiple Vitamins-Minerals (MULTIVITAL PO) Take 1 tablet by mouth daily       nitroglycerin (NITROSTAT) 0.4 MG SL tablet Place 1  "tablet (0.4 mg) under the tongue every 5 minutes as needed for chest pain if you are still having symptoms after 3 doses (15 minutes) call 911. 25 tablet 1     PROAIR  (90 Base) MCG/ACT inhaler INHALE 2 PUFFS INTO THE LUNGS EVERY 4 HOURS AS NEEDED FOR SHORTNESS OF BREATH OR DIFFICULT BREATHING OR WHEEZING 8.5 g 1     ticagrelor (BRILINTA) 90 MG tablet Take 1 tablet (90 mg) by mouth 2 times daily 180 tablet 3               Review of Systems:   The 10 point Review of Systems is negative other than noted in the HPI           Physical Exam:   Blood pressure 136/78, pulse 79, temperature 98  F (36.7  C), temperature source Tympanic, height 1.803 m (5' 11\"), weight 105.2 kg (232 lb), SpO2 98 %.    Exam:  Constitutional: healthy appearing, alert and in no distress  Heent: Normocephalic. Head without obvious masses or lesions. PERRLDC, EOMI. Mouth exam within normal limits: tongue, mucous membranes, posterior pharynx all normal, no lesions or abnormalities seen.  Tm's and canals within normal limits bilaterally. Neck supple, no nuchal rigidity or masses. No supraclavicular, or cervical adenopathy. Thyroid symmetric, no masses.  Cardiovascular: Regular rate and rhythm, no murmer, rub or gallops.  JVP not elevated, no edema.  Carotids within normal limits bilaterally, no bruits.  Respiratory: Normal respiratory effort.  Lungs clear, normal flow, no wheezing or crackles.  Breasts: Normal bilaterally.  No masses or lesions.  Nipples within normal limites.  No axillary lesions or nodes.  Gastrointestinal: Normal active bowel sounds.   Soft, not tender, no masses, guarding or rebound.  No hepatosplenomegaly.   Genitourinary: Rectal per urol  Musculoskeletal: extremities normal, no gross deformities noted.  Skin: no suspicious lesions or rashes   Neurologic: Mental status within normal limits.  Speech fluent.  No gross motor abnormalities and gait intact.  Psychiatric: mentation appears normal and affect normal.         " "Data:   Labs noted, others to be done today        Assessment:   1. Normal complete physical exam  2. Cough, asthma/copd, doubt malig cause, chf, pulmonary embolis, etc  3. Ascvd, stable, no asa as allergic  4. Elevated cholesterol on statin, higher dose now  5. Cap, gisella, follow up urol  6. Aortic aneurysm, not seen  7. Elevated sugar, follow up labs, weight loss  8. Gerd, call if worsens  9. ?hypertension, he will check it and call if up  10. hcm         Plan:   shingrix at pharm  Monitor blood pressure  Change flovent to advair and call if ongoing cough  Exercise, diet  Letter with labs      Zac Martínez M.D.          Are you in the first 12 months of your Medicare coverage?  No    Healthy Habits:    In general, how would you rate your overall health?  Very good    Frequency of exercise:  4-5 days/week    Duration of exercise:  Greater than 60 minutes    Do you usually eat at least 4 servings of fruit and vegetables a day, include whole grains    & fiber and avoid regularly eating high fat or \"junk\" foods?  No    Taking medications regularly:  Yes    Barriers to taking medications:  None    Medication side effects:  None    Ability to successfully perform activities of daily living:  No assistance needed    Home Safety:  No safety concerns identified    Hearing Impairment:  No hearing concerns    In the past 6 months, have you been bothered by leaking of urine?  No    In general, how would you rate your overall mental or emotional health?  Excellent      PHQ-2 Total Score:    Additional concerns today:  No    Do you feel safe in your environment? Yes    Do you have a Health Care Directive? No: Advance care planning reviewed with patient; information given to patient to review.      Fall risk       Cognitive Screening   1) Repeat 3 items (Leader, Season, Table)    2) Clock draw: NORMAL  3) 3 item recall: Recalls NO objects   Results: 0 items recalled: PROBABLE COGNITIVE IMPAIRMENT, **INFORM " PROVIDER**    [facility] Copyright SHERI Almazan. Licensed by the author for use in [facility]; reprinted with permission (soren@.[email or web address]). All rights reserved.      Do you have sleep apnea, excessive snoring or daytime drowsiness?: no    Reviewed and updated as needed this visit by clinical staff         Reviewed and updated as needed this visit by Provider        Social History     Tobacco Use     Smoking status: Former Smoker     Packs/day: 1.00     Years: 20.00     Pack years: 20.00     Types: Cigarettes     Last attempt to quit: 1994     Years since quittin.4     Smokeless tobacco: Never Used   Substance Use Topics     Alcohol use: Yes     Alcohol/week: 0.0 oz     Comment: occ beer     If you drink alcohol do you typically have >3 drinks per day or >7 drinks per week? No    Alcohol Use 2017   Prescreen: >3 drinks/day or >7 drinks/week? The patient does not drink >3 drinks per day nor >7 drinks per week.               Current providers sharing in care for this patient include: cardiology  Patient Care Team:  Zac Martínez MD as PCP - General (Internal Medicine)  Ti Gentile MD as MD (Urology)  Zac Martínez MD as Assigned PCP    The following health maintenance items are reviewed in Epic and correct as of today:  Health Maintenance   Topic Date Due     COPD ACTION PLAN  1940     ZOSTER IMMUNIZATION (2 of 3) 2010     ASTHMA ACTION PLAN  2014     ADVANCE CARE PLANNING  2017     MEDICARE ANNUAL WELLNESS VISIT  2018     PHQ-2  2019     ASTHMA CONTROL TEST  2019     INFLUENZA VACCINE (1) 2019     FALL RISK ASSESSMENT  2019     DTAP/TDAP/TD IMMUNIZATION (2 - Td) 2022     SPIROMETRY  Completed     IPV IMMUNIZATION  Aged Out     MENINGITIS IMMUNIZATION  Aged Out           Review of Systems      OBJECTIVE:   There were no vitals taken for this visit. Estimated body mass index is 32.22 kg/m  as calculated from the  "following:    Height as of 7/1/19: 1.803 m (5' 11\").    Weight as of 7/1/19: 104.8 kg (231 lb).  Physical Exam          ASSESSMENT / PLAN:       End of Life Planning:  Patient currently has an advanced directive: none, I rec it    COUNSELING:  Reviewed preventive health counseling, as reflected in patient instructions       Regular exercise       Healthy diet/nutrition    Estimated body mass index is 32.22 kg/m  as calculated from the following:    Height as of 7/1/19: 1.803 m (5' 11\").    Weight as of 7/1/19: 104.8 kg (231 lb).         reports that he quit smoking about 25 years ago. His smoking use included cigarettes. He has a 20.00 pack-year smoking history. He has never used smokeless tobacco.      Appropriate preventive services were discussed with this patient, including applicable screening as appropriate for cardiovascular disease, diabetes, osteopenia/osteoporosis, and glaucoma.  As appropriate for age/gender, discussed screening for colorectal cancer, prostate cancer, breast cancer, and cervical cancer. Checklist reviewing preventive services available has been given to the patient.    Reviewed patients plan of care and provided an AVS. The Basic Care Plan (routine screening as documented in Health Maintenance) for Romel meets the Care Plan requirement. This Care Plan has been established and reviewed with the Patient.    Counseling Resources:  ATP IV Guidelines  Pooled Cohorts Equation Calculator  Breast Cancer Risk Calculator  FRAX Risk Assessment  ICSI Preventive Guidelines  Dietary Guidelines for Americans, 2010  USDA's MyPlate  ASA Prophylaxis  Lung CA Screening    Zac Martínez MD  [facility]    Identified Health Risks:  "

## 2019-07-11 NOTE — RESULT ENCOUNTER NOTE
It was a pleasure seeing you.  I wanted to get back to you with your test results.  I have enclosed a copy for your records.    Your labs look super.  The hemoglobin a1c or diabetes test is fine and your complete blood count is normal.  If you have any questions please call me.

## 2019-07-11 NOTE — LETTER
[address] [facility]  [address]  [place]  [address]  Tel: [phone]    July 11, 2019    Romel Hamlinson  [address] [place] [address]   [facility] [address]        Dear Mr. Bonilla,    It was a pleasure seeing you.  I wanted to get back to you with your test results.  I have enclosed a copy for your records.    Your labs look super.  The hemoglobin a1c or diabetes test is fine and your complete blood count is normal.  If you have any questions please call me.      Sincerely,    Zac Martínez MD/JOSEPH          Enclosure: Lab Results  Results for orders placed or performed in visit on 07/11/19   CBC with platelets   Result Value Ref Range    WBC 4.8 4.0 - 11.0 10e9/L    RBC Count 4.25 (L) 4.4 - 5.9 10e12/L    Hemoglobin 14.1 13.3 - 17.7 g/dL    Hematocrit 40.9 40.0 - 53.0 %    MCV 96 78 - 100 fl    MCH 33.2 (H) 26.5 - 33.0 pg    MCHC 34.5 31.5 - 36.5 g/dL    RDW 13.2 10.0 - 15.0 %    Platelet Count 228 150 - 450 10e9/L   Hemoglobin A1c   Result Value Ref Range    Hemoglobin A1C 5.6 0 - 5.6 %

## 2019-07-19 ENCOUNTER — TELEPHONE (OUTPATIENT)
Dept: FAMILY MEDICINE | Facility: CLINIC | Age: 79
End: 2019-07-19

## 2019-07-19 DIAGNOSIS — J44.9 CHRONIC OBSTRUCTIVE PULMONARY DISEASE, UNSPECIFIED COPD TYPE (H): ICD-10-CM

## 2019-07-19 RX ORDER — PREDNISONE 20 MG/1
20 TABLET ORAL DAILY
Qty: 5 TABLET | Refills: 0 | Status: SHIPPED | OUTPATIENT
Start: 2019-07-19 | End: 2019-07-25

## 2019-07-19 NOTE — TELEPHONE ENCOUNTER
ERx for prednisone sent.    Please advise patient to seek medical attention if his cough persists/worsens or if he develops fever/chills, chest pain, shortness of breath.

## 2019-07-19 NOTE — TELEPHONE ENCOUNTER
Pt saw Danni for Cough 8 days ago, and was told to call if cough ongoing.  Changed from Flovent to Advair at that visit and does report this is helping, and also has albuterol inhaler for prn. Reports ongoing cough exacerbations every 3 months or so.   Sx currently are clear, productive cough, wheezing that clears with cough, but builds up again.   As cough still hanging on this time, pt asking for prednisone.       Pended pt's requested RX that Dr. Martínez last gave 5/15/18.    Please advise if appropriate, or if pt should wait for Dr. Martínez or seek urgent care.  Whitney Echeverria RN

## 2019-07-19 NOTE — TELEPHONE ENCOUNTER
"Reason for call:  Patient reporting a symptom    Symptom or request: cough    Duration (how long have symptoms been present): \"a long time\" - the doctor knows about it    Have you been treated for this before? Yes    Additional comments: Requesting prednisone, 20 tablets, sent to:    [facility] [id number] [place] [address] - [address] [facility]    Phone Number patient can be reached at:  Cell number on file:    Telephone Information:   Mobile [phone]     Best Time:  any    Can we leave a detailed message on this number:  YES    Call taken on 7/19/2019 at 9:19 AM by Karey Barbosa    "

## 2019-07-25 ENCOUNTER — OFFICE VISIT (OUTPATIENT)
Dept: FAMILY MEDICINE | Facility: CLINIC | Age: 79
End: 2019-07-25
Payer: COMMERCIAL

## 2019-07-25 VITALS
DIASTOLIC BLOOD PRESSURE: 67 MMHG | OXYGEN SATURATION: 94 % | WEIGHT: 233 LBS | TEMPERATURE: 97.5 F | HEART RATE: 87 BPM | SYSTOLIC BLOOD PRESSURE: 134 MMHG | BODY MASS INDEX: 32.5 KG/M2

## 2019-07-25 DIAGNOSIS — J47.1 BRONCHIECTASIS WITH ACUTE EXACERBATION (H): ICD-10-CM

## 2019-07-25 DIAGNOSIS — J44.9 CHRONIC OBSTRUCTIVE PULMONARY DISEASE, UNSPECIFIED COPD TYPE (H): Primary | ICD-10-CM

## 2019-07-25 PROCEDURE — 99213 OFFICE O/P EST LOW 20 MIN: CPT | Performed by: INTERNAL MEDICINE

## 2019-07-25 RX ORDER — AZITHROMYCIN 250 MG/1
TABLET, FILM COATED ORAL
COMMUNITY
Start: 2019-07-21 | End: 2020-04-14

## 2019-07-25 RX ORDER — PREDNISONE 20 MG/1
40 TABLET ORAL
COMMUNITY
Start: 2019-07-22 | End: 2019-07-25

## 2019-07-25 RX ORDER — CODEINE PHOSPHATE AND GUAIFENESIN 10; 100 MG/5ML; MG/5ML
1-2 SOLUTION ORAL EVERY 6 HOURS PRN
Qty: 180 ML | Refills: 0 | Status: SHIPPED | OUTPATIENT
Start: 2019-07-25 | End: 2020-08-27

## 2019-07-25 RX ORDER — GUAIFENESIN 600 MG/1
600 TABLET, EXTENDED RELEASE ORAL 2 TIMES DAILY
Qty: 20 TABLET | Refills: 1 | Status: SHIPPED | OUTPATIENT
Start: 2019-07-25 | End: 2020-08-27

## 2019-07-25 RX ORDER — PREDNISONE 20 MG/1
20 TABLET ORAL 2 TIMES DAILY
Qty: 6 TABLET | Refills: 0 | Status: SHIPPED | OUTPATIENT
Start: 2019-07-25 | End: 2020-05-07

## 2019-07-25 RX ORDER — BENZONATATE 200 MG/1
200 CAPSULE ORAL
COMMUNITY
Start: 2019-07-21 | End: 2020-08-27

## 2019-07-25 RX ORDER — CODEINE PHOSPHATE AND GUAIFENESIN 10; 100 MG/5ML; MG/5ML
LIQUID ORAL
Refills: 0 | COMMUNITY
Start: 2019-07-21 | End: 2020-05-07

## 2019-07-25 NOTE — PROGRESS NOTES
Subjective     Romel Bonilla is a 78 year old male who presents to clinic today for the following health issues:    HPI   ED/UC Followup:    Facility:  [facility]  Date of visit: 7/21/19  Reason for visit: Cough  Current Status: At home     The patient presents for follow-up on his cough.  As noted, I recently saw him for a physical at which time he had a chronic cough.  I changed his Flovent to Advair.  His cough worsens and he presented to urgent care was given Zithromax as well as prednisone.  As noted, he has a history of COPD and asthma as well as bronchiectasis and a CT in 2012.  The cough is mostly productive.  He really does not have cold symptoms.  Some chronic sinus drainage.  No fevers or night sweats.  Some shortness of breath.  He feels a little bit better but not a lot.  He went golfing yesterday and did well but then coughed a lot last night.  No nausea or vomiting.  No chest pain.    Past Medical History:   Diagnosis Date     Anxiety      Ascending aorta dilatation (H)     echo 7/19 did not show it, nl echo     Asthma     Dr. Lennox     COPD (chronic obstructive pulmonary disease) (H)     bronchiectasis on multiple inhalers      Coronary artery disease     Cath 3/14/16- 95% ostail RCA stenosis>>CECILIO placed, fu nuclear est 5/17 nl     Essential hypertension, benign     off medications with life style     GERD (gastroesophageal reflux disease)      Groin hematoma     right- 3/2016 post angio     Gross hematuria 09/2018    ct neg, Dr. Gentile     Hoarseness      Hyperglycemia      Hyperlipidemia LDL goal <70      Lung nodule 2012    indeterminate-3 mm-needs 12 month follow up; fu done 3/16 at [facility] and benign, no fu needed     Obesity, unspecified      Osteoarthrosis, unspecified whether generalized or localized, unspecified site      Prostate CA (H) 2012    metastatic-guerita's score 7, seeds and xrt, now seeing Dr. Gentile     Urinary retention 2016    due to asa     Past Surgical History:    Procedure Laterality Date     C NONSPECIFIC PROCEDURE      Rt knee arthroscopy and menisectomy     C NONSPECIFIC PROCEDURE      RT HERNIA REPAIR     CATARACT IOL, RT/LT  2016     CYSTOSCOPY       CYSTOSCOPY, DILATE URETHRA, COMBINED N/A 2018    Procedure: COMBINED CYSTOSCOPY, DILATE URETHRA;  VIDEO CYSTOSCOPY WITH GASCA DILATION , WITH EXTRACTION OF  PROSTATIC STONE. CAUTERY OF PROASTATIC VEINS  ;  Surgeon: Ti Gentile MD;  Location: [facility] OR     HEART CATH LEFT HEART CATH  3/14/16    95% ostail RCA stenosis>>CECILIO placed     INSERT RADIATION SEEDS PROSTATE  7/10/2012    seeds and xrt     ROTATOR CUFF REPAIR RT/LT       Social History     Socioeconomic History     Marital status:      Spouse name: Not on file     Number of children: 4     Years of education: Not on file     Highest education level: Not on file   Occupational History     Occupation: sold insurance   Social Needs     Financial resource strain: Not on file     Food insecurity:     Worry: Not on file     Inability: Not on file     Transportation needs:     Medical: Not on file     Non-medical: Not on file   Tobacco Use     Smoking status: Former Smoker     Packs/day: 1.00     Years: 20.00     Pack years: 20.00     Types: Cigarettes     Last attempt to quit: 1994     Years since quittin.5     Smokeless tobacco: Never Used   Substance and Sexual Activity     Alcohol use: Yes     Alcohol/week: 0.0 oz     Comment: occ beer     Drug use: No     Sexual activity: Yes     Partners: Female   Lifestyle     Physical activity:     Days per week: Not on file     Minutes per session: Not on file     Stress: Not on file   Relationships     Social connections:     Talks on phone: Not on file     Gets together: Not on file     Attends [employer] service: Not on file     Active member of club or organization: Not on file     Attends meetings of clubs or organizations: Not on file     Relationship status: Not on file     Intimate  partner violence:     Fear of current or ex partner: Not on file     Emotionally abused: Not on file     Physically abused: Not on file     Forced sexual activity: Not on file   Other Topics Concern     Parent/sibling w/ CABG, MI or angioplasty before 65F 55M? Not Asked      Service Not Asked     Blood Transfusions Not Asked     Caffeine Concern No     Comment: occ      Occupational Exposure Not Asked     Hobby Hazards Not Asked     Sleep Concern Yes     Comment: due to wife passing away a month ago     Stress Concern No     Weight Concern No     Special Diet No     Back Care Not Asked     Exercise No     Comment: not currently, starting cardiac rehab soon      Bike Helmet Not Asked     Seat Belt Yes     Self-Exams Not Asked   Social History Narrative     Not on file     Current Outpatient Medications   Medication Sig Dispense Refill     Acetaminophen (TYLENOL PO) Take by mouth every 6 hours as needed for mild pain or fever       ASPIRIN NOT PRESCRIBED (INTENTIONAL) Please choose reason not prescribed, below       atorvastatin (LIPITOR) 80 MG tablet Take 1 tablet (80 mg) by mouth At Bedtime 90 tablet 3     azithromycin (ZITHROMAX) 250 MG tablet Take 500 mg (2 tabs) by mouth on day 1, then 250 mg (1 tab) daily for days 2-5.       benzonatate (TESSALON) 200 MG capsule Take 200 mg by mouth       cetirizine (ZYRTEC) 10 MG tablet Take 1 tablet (10 mg) by mouth every evening 30 tablet 1     cinnamon 500 MG CAPS Take 1 capsule by mouth daily       fluticasone (FLONASE) 50 MCG/ACT spray Spray 1-2 sprays into both nostrils daily 1 Bottle 3     fluticasone-salmeterol (ADVAIR-HFA) 45-21 MCG/ACT inhaler Inhale 2 puffs into the lungs 2 times daily 3 Inhaler 3     guaiFENesin (MUCINEX) 600 MG 12 hr tablet Take 1 tablet (600 mg) by mouth 2 times daily 20 tablet 1     guaiFENesin (MUCINEX) 600 MG 12 hr tablet Take 1 tablet (600 mg) by mouth 2 times daily 20 tablet 0     guaiFENesin-codeine (ROBITUSSIN AC) 100-10 MG/5ML  solution Take 5-10 mLs by mouth every 6 hours as needed for cough 180 mL 0     Multiple Vitamins-Minerals (MULTIVITAL PO) Take 1 tablet by mouth daily       nitroglycerin (NITROSTAT) 0.4 MG SL tablet Place 1 tablet (0.4 mg) under the tongue every 5 minutes as needed for chest pain if you are still having symptoms after 3 doses (15 minutes) call 911. 25 tablet 1     predniSONE (DELTASONE) 20 MG tablet Take 1 tablet (20 mg) by mouth 2 times daily 6 tablet 0     PROAIR  (90 Base) MCG/ACT inhaler INHALE 2 PUFFS INTO THE LUNGS EVERY 4 HOURS AS NEEDED FOR SHORTNESS OF BREATH OR DIFFICULT BREATHING OR WHEEZING 8.5 g 1     ticagrelor (BRILINTA) 90 MG tablet Take 1 tablet (90 mg) by mouth 2 times daily 180 tablet 3     VIRTUSSIN A/C 100-10 MG/5ML solution TK 5 MLS PO Q 4 HOURS PRF COUGH. MAX OF 60 MLS IN 24 HOURS  0     Allergies   Allergen Reactions     Cefprozil Diarrhea     Aspirin      Urinary retention     Plavix [Clopidogrel] Itching     FAMILY HISTORY NOTED AND REVIEWED    REVIEW OF SYSTEMS: above    PHYSICAL EXAM    /67 (BP Location: Right arm, Patient Position: Chair, Cuff Size: Adult Large)   Pulse 87   Temp 97.5  F (36.4  C) (Oral)   Wt 105.7 kg (233 lb)   SpO2 94%   BMI 32.50 kg/m      Patient appears non toxic  Lungs with bilat inspir and expir rhonchi, no crackles, normal flow  cv reglar rate and rhythm, no jvp     ASSESSMENT:  Cough, copd/asthma, adult bronchiectasis, doubt pneumonia, chf, pulmonary embolis    PLAN:  Add mucinex bid  Anatoliy ac prn  Continue inhaler  3 more days of prednisone  Finish zpak  Call if worsens, consider adding neg    Zac Martínez M.D.

## 2019-07-25 NOTE — PATIENT INSTRUCTIONS
Add mucinex one tablet twice daily    Continue the prednisone for 3 more days    Finish the antibiotic    Use the cough syrup as needed    Call if you get worse or are not better in the next 24 hours    Zac Martínez M.D.

## 2019-09-09 DIAGNOSIS — N40.0 BPH (BENIGN PROSTATIC HYPERPLASIA): Primary | ICD-10-CM

## 2019-09-09 DIAGNOSIS — Z87.448 H/O URETHRAL STRICTURE: ICD-10-CM

## 2019-09-11 ENCOUNTER — OFFICE VISIT (OUTPATIENT)
Dept: UROLOGY | Facility: CLINIC | Age: 79
End: 2019-09-11
Payer: COMMERCIAL

## 2019-09-11 VITALS — BODY MASS INDEX: 29.4 KG/M2 | WEIGHT: 210 LBS | HEART RATE: 76 BPM | HEIGHT: 71 IN

## 2019-09-11 DIAGNOSIS — Z85.46 PERSONAL HISTORY OF MALIGNANT NEOPLASM OF PROSTATE: Primary | ICD-10-CM

## 2019-09-11 LAB — PSA SERPL-MCNC: <0.04 NG/ML (ref 0–4)

## 2019-09-11 PROCEDURE — 36415 COLL VENOUS BLD VENIPUNCTURE: CPT | Performed by: UROLOGY

## 2019-09-11 PROCEDURE — 99212 OFFICE O/P EST SF 10 MIN: CPT | Performed by: UROLOGY

## 2019-09-11 PROCEDURE — 84153 ASSAY OF PSA TOTAL: CPT | Performed by: UROLOGY

## 2019-09-11 ASSESSMENT — MIFFLIN-ST. JEOR: SCORE: 1684.68

## 2019-09-11 ASSESSMENT — PAIN SCALES - GENERAL: PAINLEVEL: NO PAIN (0)

## 2019-09-11 NOTE — NURSING NOTE
Here for prostate cancer follow up. PSAdone this am.Urinating well  A little slower stream at times.Kirsty Chau LPN

## 2019-09-11 NOTE — LETTER
9/11/2019       RE: Romel Bonilla  [address]  [facility] [address]     Dear Colleague,    Thank you for referring your patient, Romel Bonilla, to the [facility] [place] [facility]. Please see a copy of my visit note below.    Romel is an 78-year-old gentleman who was treated for prostate cancer many years ago with seed implants and external radiation.  His PSA remains undetectable.  Last year he underwent dilation of a urethral stricture and removal of the prostatic calculus and cautery of some prostatic veins.  He has had no bleeding since.  He is voiding comfortably.  Other past medical history: History of anxiety, ascending aortic dilation, asthma, bronchiectasis, coronary artery disease, hypertension, GERD, hoarseness, hyperglycemia, hyperlipidemia, lung nodule, osteoarthrosis, former smoker.  Surgeries reviewed  Medications: No change  Allergies: Cephalosporins, aspirin, Plavix  Review of systems: As above  Exam: Alert and oriented, normal appearance, normal vital signs.  Small right hand hematoma from blood draw.  Normal respirations, neuro grossly intact  Assessment: No evidence of recurrent prostate cancer, no hematuria  Plan: See me yearly with PSA and for urinalysis.  Ice pack to right hand every hour for 20 minutes for the next 2 hours    Again, thank you for allowing me to participate in the care of your patient.      Sincerely,    Ti Gentile MD

## 2019-11-25 ENCOUNTER — CARE COORDINATION (OUTPATIENT)
Dept: CARDIOLOGY | Facility: CLINIC | Age: 79
End: 2019-11-25

## 2019-11-25 NOTE — PROGRESS NOTES
Pt called to tell me his brilinta is too expensive, and he would like an alternative medication. He has a one month supply left. Pt has hx of rotational atherectomy of the RCA, and CECILIO to RCA in 3/2016. It looks like a tiering exception was denied for brilinta earlier this year.    I reviewed pt's records. He has an allergy to ASA listed, with the allergy listed as urinary retention.Pt also has hx of BPH, and has had it treated.. Pt thinks he may have ASA allergy listed because he had trouble swallowing with excedrin in the past, and thinks it was because of the ASA. He has never tried ASA alone.    Pt has an allergy to plavix listed with itching as a side effect. Pt told me has always had issue with itchy skin, especially in the winter. Pt can't remember if the plavix worsened it.     I told pt being ASA alone would be the most affordable, but hard to know if he has an actual allergy to it. I will update  to see if he is ok with pt trying plavix again, or if he should just try the ASA 81 mg daily as it has been over a year since his stent placement. Radha DOWNS

## 2019-12-02 NOTE — PROGRESS NOTES
I called pt and let him know that  recommended he try the enteric coated ASA 81 mg daily. Pt to call if he has any issues with side effects on it. Pt told to go to ED if he ever has side effects such as throat tightening and trouble breathing. Pt said he never had throat tightening on Excedrin in the past. It was more like a reflux sensation. He will call if he doesn't tolerate the ASA. Pt will stop the brilinta once he has started ASA 81 mg daily. Radha DOWNS December 2, 2019, 11:07 AM

## 2020-01-16 ENCOUNTER — TRANSFERRED RECORDS (OUTPATIENT)
Dept: HEALTH INFORMATION MANAGEMENT | Facility: CLINIC | Age: 80
End: 2020-01-16

## 2020-04-14 ENCOUNTER — VIRTUAL VISIT (OUTPATIENT)
Dept: FAMILY MEDICINE | Facility: CLINIC | Age: 80
End: 2020-04-14
Payer: COMMERCIAL

## 2020-04-14 DIAGNOSIS — J45.21 MILD INTERMITTENT ASTHMA WITH EXACERBATION: Primary | ICD-10-CM

## 2020-04-14 PROCEDURE — 99213 OFFICE O/P EST LOW 20 MIN: CPT | Mod: 95 | Performed by: INTERNAL MEDICINE

## 2020-04-14 RX ORDER — PREDNISONE 20 MG/1
20 TABLET ORAL DAILY
Qty: 5 TABLET | Refills: 0 | Status: SHIPPED | OUTPATIENT
Start: 2020-04-14 | End: 2020-05-07

## 2020-04-14 NOTE — PROGRESS NOTES
"Romel Bonilla is a 79 year old male who is being evaluated via a billable telephone visit.      The patient has been notified of following:   PCP is     This was a scheduled as a virtual visit instead of office visit due to coronavirus pandemic.      \"This telephone visit will be conducted via a call between you and your physician/provider. We have found that certain health care needs can be provided without the need for a physical exam.  This service lets us provide the care you need with a short phone conversation.  If a prescription is necessary we can send it directly to your pharmacy.  If lab work is needed we can place an order for that and you can then stop by our lab to have the test done at a later time.    Telephone visits are billed at different rates depending on your insurance coverage. During this emergency period, for some insurers they may be billed the same as an in-person visit.  Please reach out to your insurance provider with any questions.    If during the course of the call the physician/provider feels a telephone visit is not appropriate, you will not be charged for this service.\"    Patient has given verbal consent for Telephone visit?  Yes    How would you like to obtain your AVS? Mail a copy    Subjective     Romel Bonilla is a 79 year old male who presents to clinic today for the following health issues:    RESPIRATORY SYMPTOMS      Duration: few years    Description  cough    Severity: moderate    Accompanying signs and symptoms: None    History (predisposing factors):  yes    Precipitating or alleviating factors: None    Therapies tried and outcome:  prednisone    Patient is saying that once or twice a year he needs prednisone  He develops coug and then tries everything for symptomatic relief and sometimes he needs prednisone   He denies any fever or any other complaints  Does not need any antibiotics  Using his inhalers   Requesting prednisone.        Patient Active " Problem List   Diagnosis     Benign essential hypertension     Anxiety     Prostate CA (H)     Hyperglycemia     PND (post-nasal drip)     Coronary artery disease involving native coronary artery of native heart without angina pectoris     Chronic obstructive pulmonary disease, unspecified COPD type (H)     Gastroesophageal reflux disease without esophagitis     Mild persistent asthma without complication     Pure hypercholesterolemia     Ascending aorta dilatation (H)     Urinary retention     Bronchiectasis (H)     Past Surgical History:   Procedure Laterality Date     C NONSPECIFIC PROCEDURE      Rt knee arthroscopy and menisectomy     C NONSPECIFIC PROCEDURE      RT HERNIA REPAIR     CATARACT IOL, RT/LT       CYSTOSCOPY       CYSTOSCOPY, DILATE URETHRA, COMBINED N/A 2018    Procedure: COMBINED CYSTOSCOPY, DILATE URETHRA;  VIDEO CYSTOSCOPY WITH GASCA DILATION , WITH EXTRACTION OF  PROSTATIC STONE. CAUTERY OF PROASTATIC VEINS  ;  Surgeon: Ti Gentile MD;  Location: [facility] OR     HEART CATH LEFT HEART CATH  3/14/16    95% ostail RCA stenosis>>CECILIO placed     INSERT RADIATION SEEDS PROSTATE  7/10/2012    seeds and xrt     ROTATOR CUFF REPAIR RT/LT         Social History     Tobacco Use     Smoking status: Former Smoker     Packs/day: 1.00     Years: 20.00     Pack years: 20.00     Types: Cigarettes     Last attempt to quit: 1994     Years since quittin.2     Smokeless tobacco: Never Used   Substance Use Topics     Alcohol use: Yes     Alcohol/week: 0.0 standard drinks     Comment: occ beer     Family History   Problem Relation Age of Onset     Myocardial Infarction Mother      Other - See Comments Mother         Angina     Myocardial Infarction Father      Arthritis Father      Family History Negative Sister      Lupus Sister      Obesity Sister      Cancer Brother      Family History Negative Son      Family History Negative Son      Family History Negative Son      Family History  Negative Son      Unknown/Adopted Maternal Grandmother      Myocardial Infarction Maternal Grandfather         40s or 50s     Unknown/Adopted Paternal Grandmother      Unknown/Adopted Paternal Grandfather          Current Outpatient Medications   Medication Sig Dispense Refill     Acetaminophen (TYLENOL PO) Take by mouth every 6 hours as needed for mild pain or fever       aspirin (ASA) 81 MG EC tablet Take 1 tablet (81 mg) by mouth daily       atorvastatin (LIPITOR) 80 MG tablet Take 1 tablet (80 mg) by mouth At Bedtime 90 tablet 3     benzonatate (TESSALON) 200 MG capsule Take 200 mg by mouth       cetirizine (ZYRTEC) 10 MG tablet Take 1 tablet (10 mg) by mouth every evening 30 tablet 1     cinnamon 500 MG CAPS Take 1 capsule by mouth daily       fluticasone (FLONASE) 50 MCG/ACT spray Spray 1-2 sprays into both nostrils daily 1 Bottle 3     fluticasone-salmeterol (ADVAIR-HFA) 45-21 MCG/ACT inhaler Inhale 2 puffs into the lungs 2 times daily 3 Inhaler 3     guaiFENesin (MUCINEX) 600 MG 12 hr tablet Take 1 tablet (600 mg) by mouth 2 times daily 20 tablet 1     guaiFENesin-codeine (ROBITUSSIN AC) 100-10 MG/5ML solution Take 5-10 mLs by mouth every 6 hours as needed for cough 180 mL 0     Multiple Vitamins-Minerals (MULTIVITAL PO) Take 1 tablet by mouth daily       nitroglycerin (NITROSTAT) 0.4 MG SL tablet Place 1 tablet (0.4 mg) under the tongue every 5 minutes as needed for chest pain if you are still having symptoms after 3 doses (15 minutes) call 911. 25 tablet 1     predniSONE (DELTASONE) 20 MG tablet Take 1 tablet (20 mg) by mouth daily 5 tablet 0     predniSONE (DELTASONE) 20 MG tablet Take 1 tablet (20 mg) by mouth 2 times daily 6 tablet 0     PROAIR  (90 Base) MCG/ACT inhaler INHALE 2 PUFFS INTO THE LUNGS EVERY 4 HOURS AS NEEDED FOR SHORTNESS OF BREATH OR DIFFICULT BREATHING OR WHEEZING 8.5 g 1     VIRTUSSIN A/C 100-10 MG/5ML solution TK 5 MLS PO Q 4 HOURS PRF COUGH. MAX OF 60 MLS IN 24 HOURS  0        Reviewed and updated as needed this visit by Provider         Review of Systems   ROS COMP: Constitutional, HEENT, cardiovascular, pulmonary, gi and gu systems are negative, except as otherwise noted.       Objective   Reported vitals:  There were no vitals taken for this visit.   healthy, alert and no distress  PSYCH: Alert and oriented times 3; coherent speech, normal   rate and volume, able to articulate logical thoughts, able   to abstract reason, no tangential thoughts, no hallucinations   or delusions  His affect is normal  RESP: No cough, no audible wheezing, able to talk in full sentences  Remainder of exam unable to be completed due to telephone visits            Assessment/Plan:  1. Mild intermittent asthma with exacerbation  Patient has cough and requesting prednisone   No sign or symptoms of infection.  - predniSONE (DELTASONE) 20 MG tablet; Take 1 tablet (20 mg) by mouth daily  Dispense: 5 tablet; Refill: 0  Educated about the medication       Return in about 3 months (around 7/14/2020) for Asthma.      Phone call duration:  5 minutes    Jennifer Kitchen MD

## 2020-04-14 NOTE — PATIENT INSTRUCTIONS
Take prednisone 20 mg daily for 5 days with food.  Follow up with PCP for face to face visit in 2-3 months once covid-19 pandemic improves  Seek sooner medical attention if there is any worsening of symptoms or problems.

## 2020-05-07 ENCOUNTER — VIRTUAL VISIT (OUTPATIENT)
Dept: FAMILY MEDICINE | Facility: CLINIC | Age: 80
End: 2020-05-07
Payer: COMMERCIAL

## 2020-05-07 DIAGNOSIS — J45.21 MILD INTERMITTENT ASTHMA WITH EXACERBATION: Primary | ICD-10-CM

## 2020-05-07 PROCEDURE — 99213 OFFICE O/P EST LOW 20 MIN: CPT | Mod: 95 | Performed by: INTERNAL MEDICINE

## 2020-05-07 RX ORDER — PREDNISONE 10 MG/1
TABLET ORAL
Qty: 20 TABLET | Refills: 0 | Status: SHIPPED | OUTPATIENT
Start: 2020-05-07 | End: 2020-06-15

## 2020-05-07 NOTE — PROGRESS NOTES
"Romel Bonilla is a 79 year old male who is being evaluated via a billable video visit.      The patient has been notified of following:     \"This video visit will be conducted via a call between you and your physician/provider. We have found that certain health care needs can be provided without the need for an in-person physical exam.  This service lets us provide the care you need with a video conversation.  If a prescription is necessary we can send it directly to your pharmacy.  If lab work is needed we can place an order for that and you can then stop by our lab to have the test done at a later time.    Video visits are billed at different rates depending on your insurance coverage.  Please reach out to your insurance provider with any questions.    If during the course of the call the physician/provider feels a video visit is not appropriate, you will not be charged for this service.\"    Patient has given verbal consent for Video visit? Yes    How would you like to obtain your AVS? Mail a copy    Patient would like the video invitation sent by: Text to cell phone: [phone]    Will anyone else be joining your video visit? No      Subjective     Romel Bonilla is a 79 year old male who presents to clinic today for the following health issues:    The patient is in [place] and has ongoing cough.  As noted he has h/o asthma, copd and bronchiectasis.  He was last given prednisone as noted  4/14/20.    The patient has had a cough and as noted got prednisone, he took the prednisone for 5 days and did help but did not clear it up.  The cough is productive of clear phlegm.  No cold symptoms, but has some sinus issues.  He golfs every am and has some allergy issues.  He gets this yearly in spring and fall,.  No fever.  No chest pain or shortness of breath.  He takes generic zyrtec daily and also flonase.  He is not using the advair for 2 months,      Video Start Time: 12:55    Reviewed and updated as needed " "this visit by Provider         Objective    There were no vitals taken for this visit.  Estimated body mass index is 29.29 kg/m  as calculated from the following:    Height as of 9/11/19: 1.803 m (5' 11\").    Weight as of 9/11/19: 95.3 kg (210 lb).  Physical Exam     GENERAL: healthy, alert and no distress  EYES: Eyes grossly normal to inspection, conjunctivae and sclerae normal  RESP: no audible wheeze, cough, or visible cyanosis.  No visible retractions or increased work of breathing.  Able to speak fully in complete sentences.  NEURO: Cranial nerves grossly intact, mentation intact and speech normal  PSYCH: mentation appears normal, affect normal/bright, judgement and insight intact, normal speech and appearance well-groomed    ASSESSMENT:  Cough, suspect asthma, doubt pneumonia, chf, pulmonary embolis, tumor, etc    PLAN:  Prednisone and if not gone soon he will call    Zac Martínez M.D.          Video-Visit Details    Type of service:  Video Visit    Video End Time:1:06    Originating Location (pt. Location): Home    Distant Location (provider location):  [facility]     Platform used for Video Visit: Navdeep    No follow-ups on file.                 "

## 2020-06-15 ENCOUNTER — VIRTUAL VISIT (OUTPATIENT)
Dept: FAMILY MEDICINE | Facility: CLINIC | Age: 80
End: 2020-06-15
Payer: COMMERCIAL

## 2020-06-15 DIAGNOSIS — J45.21 MILD INTERMITTENT ASTHMA WITH EXACERBATION: ICD-10-CM

## 2020-06-15 DIAGNOSIS — R05.9 COUGH: Primary | ICD-10-CM

## 2020-06-15 DIAGNOSIS — R06.2 WHEEZE: ICD-10-CM

## 2020-06-15 PROCEDURE — 99214 OFFICE O/P EST MOD 30 MIN: CPT | Mod: 95 | Performed by: INTERNAL MEDICINE

## 2020-06-15 RX ORDER — PREDNISONE 10 MG/1
TABLET ORAL
Qty: 20 TABLET | Refills: 0 | Status: SHIPPED | OUTPATIENT
Start: 2020-06-15 | End: 2020-08-27

## 2020-06-15 RX ORDER — MONTELUKAST SODIUM 10 MG/1
10 TABLET ORAL AT BEDTIME
Qty: 90 TABLET | Refills: 0 | Status: SHIPPED | OUTPATIENT
Start: 2020-06-15 | End: 2020-08-27

## 2020-06-15 RX ORDER — AZITHROMYCIN 250 MG/1
TABLET, FILM COATED ORAL
Qty: 6 TABLET | Refills: 0 | Status: SHIPPED | OUTPATIENT
Start: 2020-06-15 | End: 2020-06-20

## 2020-06-15 NOTE — PROGRESS NOTES
"Romel Bonilla is a 79 year old male who is being evaluated via a billable telephone visit.      The patient has been notified of following:     \"This telephone visit will be conducted via a call between you and your physician/provider. We have found that certain health care needs can be provided without the need for a physical exam.  This service lets us provide the care you need with a short phone conversation.  If a prescription is necessary we can send it directly to your pharmacy.  If lab work is needed we can place an order for that and you can then stop by our lab to have the test done at a later time.    Telephone visits are billed at different rates depending on your insurance coverage. During this emergency period, for some insurers they may be billed the same as an in-person visit.  Please reach out to your insurance provider with any questions.    If during the course of the call the physician/provider feels a telephone visit is not appropriate, you will not be charged for this service.\"    Patient has given verbal consent for Telephone visit?  Yes    What phone number would you like to be contacted at? [phone]    How would you like to obtain your AVS? Alfredito    Subjective     Romel Bonilla is a 79 year old male who presents via phone visit today for the following health issues:    HPI  RESPIRATORY SYMPTOMS      Duration: 3-4 days    Description  nasal congestion, rhinorrhea, cough and fatigue/malaise    Severity: severe    Accompanying signs and symptoms: None    History (predisposing factors):  none    Precipitating or alleviating factors: None    Therapies tried and outcome:  Zycam     Patient requesting telephone visit for evaluation of sinus problems, he feels his face is full, describes sinuses \"like crapy\", he has been miserable for the last 2 days, describes his congestion also in his lungs and his face.  No fever associated.  No shortness of breath \"not really\", has moved back " "from wintering [place].  Certain times he feels \"little\" short of breath, he traveled a week ago last Friday by flight from [place].  No sick contacts he is aware of he.  Has been wheezing all night.  He is concerned with the pollen exposure in [place].  Denies any yellow phlegm.  He has been using the montelukast from his wife's prescription, in the past he had received prednisone that helps with his symptoms; she requests a refill, he has not been using the Advair inhaler and uses albuterol as needed.    Patient Active Problem List   Diagnosis     Benign essential hypertension     Anxiety     Prostate CA (H)     Hyperglycemia     PND (post-nasal drip)     Coronary artery disease involving native coronary artery of native heart without angina pectoris     Chronic obstructive pulmonary disease, unspecified COPD type (H)     Gastroesophageal reflux disease without esophagitis     Mild persistent asthma without complication     Pure hypercholesterolemia     Ascending aorta dilatation (H)     Urinary retention     Bronchiectasis (H)     Past Surgical History:   Procedure Laterality Date     C NONSPECIFIC PROCEDURE  7/01    Rt knee arthroscopy and menisectomy     C NONSPECIFIC PROCEDURE  2000    RT HERNIA REPAIR     CATARACT IOL, RT/LT  2016     CYSTOSCOPY       CYSTOSCOPY, DILATE URETHRA, COMBINED N/A 9/19/2018    Procedure: COMBINED CYSTOSCOPY, DILATE URETHRA;  VIDEO CYSTOSCOPY WITH GASCA DILATION , WITH EXTRACTION OF  PROSTATIC STONE. CAUTERY OF PROASTATIC VEINS  ;  Surgeon: Ti Gentile MD;  Location:  OR     HEART CATH LEFT HEART CATH  3/14/16    95% ostail RCA stenosis>>CECILIO placed     INSERT RADIATION SEEDS PROSTATE  7/10/2012    seeds and xrt     CO TOTAL KNEE REPLACEMENT Right 02/2020    in [place]     ROTATOR CUFF REPAIR RT/LT  2016       Social History     Tobacco Use     Smoking status: Former Smoker     Packs/day: 1.00     Years: 20.00     Pack years: 20.00     Types: Cigarettes     Last " attempt to quit: 1994     Years since quittin.4     Smokeless tobacco: Never Used   Substance Use Topics     Alcohol use: Yes     Alcohol/week: 0.0 standard drinks     Comment: occ beer     Family History   Problem Relation Age of Onset     Myocardial Infarction Mother      Other - See Comments Mother         Angina     Myocardial Infarction Father      Arthritis Father      Family History Negative Sister      Lupus Sister      Obesity Sister      Cancer Brother      Family History Negative Son      Family History Negative Son      Family History Negative Son      Family History Negative Son      Unknown/Adopted Maternal Grandmother      Myocardial Infarction Maternal Grandfather         40s or 50s     Unknown/Adopted Paternal Grandmother      Unknown/Adopted Paternal Grandfather          Current Outpatient Medications   Medication Sig Dispense Refill     Acetaminophen (TYLENOL PO) Take by mouth every 6 hours as needed for mild pain or fever       aspirin (ASA) 81 MG EC tablet Take 1 tablet (81 mg) by mouth daily       atorvastatin (LIPITOR) 80 MG tablet Take 1 tablet (80 mg) by mouth At Bedtime 90 tablet 3     azithromycin (ZITHROMAX) 250 MG tablet Take 2 tablets (500 mg) by mouth daily for 1 day, THEN 1 tablet (250 mg) daily for 4 days. 6 tablet 0     benzonatate (TESSALON) 200 MG capsule Take 200 mg by mouth       cetirizine (ZYRTEC) 10 MG tablet Take 1 tablet (10 mg) by mouth every evening 30 tablet 1     cinnamon 500 MG CAPS Take 1 capsule by mouth daily       fluticasone (FLONASE) 50 MCG/ACT spray Spray 1-2 sprays into both nostrils daily 1 Bottle 3     fluticasone-salmeterol (ADVAIR-HFA) 45-21 MCG/ACT inhaler Inhale 2 puffs into the lungs 2 times daily 3 Inhaler 3     guaiFENesin (MUCINEX) 600 MG 12 hr tablet Take 1 tablet (600 mg) by mouth 2 times daily 20 tablet 1     guaiFENesin-codeine (ROBITUSSIN AC) 100-10 MG/5ML solution Take 5-10 mLs by mouth every 6 hours as needed for cough 180 mL 0      montelukast (SINGULAIR) 10 MG tablet Take 1 tablet (10 mg) by mouth At Bedtime 90 tablet 0     Multiple Vitamins-Minerals (MULTIVITAL PO) Take 1 tablet by mouth daily       nitroglycerin (NITROSTAT) 0.4 MG SL tablet Place 1 tablet (0.4 mg) under the tongue every 5 minutes as needed for chest pain if you are still having symptoms after 3 doses (15 minutes) call 911. 25 tablet 1     predniSONE (DELTASONE) 10 MG tablet Take 2 daily for 5 days then one daily for 5 days then stop it 20 tablet 0     PROAIR  (90 Base) MCG/ACT inhaler INHALE 2 PUFFS INTO THE LUNGS EVERY 4 HOURS AS NEEDED FOR SHORTNESS OF BREATH OR DIFFICULT BREATHING OR WHEEZING 8.5 g 1     Allergies   Allergen Reactions     Cephalosporins Unknown     Cefprozil Diarrhea     Plavix [Clopidogrel] Itching     Recent Labs   Lab Test 07/11/19  0854 06/27/19  0923 08/20/18  0854 07/16/18  1025 05/21/18  0951  09/14/17  0957  06/25/12  0958   A1C 5.6  --   --  5.7*  --   --  5.6  --   --    LDL  --  94  --  82 76   < > 74   < > 165*   HDL  --  63  --  63 68   < > 73   < > 70   TRIG  --  131  --  107 136   < > 78   < > 123   ALT  --  15  --   --  23  --  32   < > 25   CR  --  1.03 1.04 0.93 0.80   < > 0.94   < > 0.93   GFRESTIMATED  --  69 69 78 >90   < > 78   < > 80   GFRESTBLACK  --  84 83 >90 >90   < > >90   < > >90   POTASSIUM  --  4.0 4.5 4.2 4.1   < > 4.1   < > 4.0   TSH  --   --   --   --   --   --   --   --  0.83    < > = values in this interval not displayed.      BP Readings from Last 3 Encounters:   07/25/19 134/67   07/11/19 136/78   07/01/19 136/84    Wt Readings from Last 3 Encounters:   09/11/19 95.3 kg (210 lb)   07/25/19 105.7 kg (233 lb)   07/11/19 105.2 kg (232 lb)                    Reviewed and updated as needed this visit by Provider  Tobacco  Allergies  Meds  Med Hx  Surg Hx  Soc Hx        Review of Systems   Constitutional, HEENT, cardiovascular, pulmonary, gi and gu systems are negative, except as otherwise noted.    "    Objective   Reported vitals:  There were no vitals taken for this visit.   healthy, alert and no distress  PSYCH: Alert and oriented times 3; coherent speech, normal   rate and volume, able to articulate logical thoughts, able   to abstract reason, no tangential thoughts, no hallucinations   or delusions  His affect is normal  RESP: No cough, no audible wheezing, able to talk in full sentences  Remainder of exam unable to be completed due to telephone visits    Diagnostic Test Results:  Labs reviewed in [facility]        Romel was seen today for cough.    Diagnoses and all orders for this visit:    Cough  -     predniSONE (DELTASONE) 10 MG tablet; Take 2 daily for 5 days then one daily for 5 days then stop it  -     montelukast (SINGULAIR) 10 MG tablet; Take 1 tablet (10 mg) by mouth At Bedtime  -     azithromycin (ZITHROMAX) 250 MG tablet; Take 2 tablets (500 mg) by mouth daily for 1 day, THEN 1 tablet (250 mg) daily for 4 days.    Mild intermittent asthma with exacerbation  -     predniSONE (DELTASONE) 10 MG tablet; Take 2 daily for 5 days then one daily for 5 days then stop it  -     montelukast (SINGULAIR) 10 MG tablet; Take 1 tablet (10 mg) by mouth At Bedtime  -     azithromycin (ZITHROMAX) 250 MG tablet; Take 2 tablets (500 mg) by mouth daily for 1 day, THEN 1 tablet (250 mg) daily for 4 days.    Wheeze  -     predniSONE (DELTASONE) 10 MG tablet; Take 2 daily for 5 days then one daily for 5 days then stop it  -     montelukast (SINGULAIR) 10 MG tablet; Take 1 tablet (10 mg) by mouth At Bedtime  -     azithromycin (ZITHROMAX) 250 MG tablet; Take 2 tablets (500 mg) by mouth daily for 1 day, THEN 1 tablet (250 mg) daily for 4 days.      Advised patient symptoms could still be viral related, has traveled from [place], he is at risk of COVID-19 illness, need to closely watch for any worsening symptoms, continue with social isolation, he denies any fever, he was not very clear on history, he states he is \"\" little " short of breath and he is wheezing.  We will go ahead and give him a course of prednisone per his request; 20 mg daily for 5 days.  Also he requested antibiotics; Zithromax for 5 days, will start him on Singulair as well per his request, seems that it helped him with his symptoms [has been using his wife's prescription of montelukast].  He attributes his symptoms as a flare from pollen exposure and he does get recurrent flares, he had one in April and May was treated with prednisone.  Advised patient if any worsening shortness of breath, difficulty breathing, fever, lethargy or change of mental status or other systemic complaints to call 911 and seek immediate medical attention.  Patient reiterated understanding.:  To call  with any new or worsening symptoms.    Return in about 1 week (around 6/22/2020), or if symptoms worsen or fail to improve, for other; cough, shortness of breath, wheeze.      Phone call duration: 11 minutes 37 seconds    Jyothi mAbrose MD

## 2020-08-07 ENCOUNTER — TELEPHONE (OUTPATIENT)
Dept: UROLOGY | Facility: CLINIC | Age: 80
End: 2020-08-07

## 2020-08-07 NOTE — TELEPHONE ENCOUNTER
Urinary Stream  Has been slowing for last 4-5 months. Able to void but stream is slower .  He needs an appointment with Dr Gentile for possible cysto  And dilation .  HX of stricture .Kirsty Chau LPN

## 2020-08-07 NOTE — TELEPHONE ENCOUNTER
[facility] Call [facility]    Phone Message    May a detailed message be left on voicemail: yes     Reason for Call: Other: The pt states he has urinary retention. He was slow last night so then there wasn't anything and today he could urinate but it was slow. Please review for an appt. Thanks.    Action Taken: Message routed to:  [facility] ([facility]    Travel Screening: Not Applicable

## 2020-08-12 DIAGNOSIS — I25.118 CORONARY ARTERY DISEASE INVOLVING NATIVE CORONARY ARTERY OF NATIVE HEART WITH OTHER FORM OF ANGINA PECTORIS (H): ICD-10-CM

## 2020-08-12 RX ORDER — ATORVASTATIN CALCIUM 80 MG/1
80 TABLET, FILM COATED ORAL AT BEDTIME
Qty: 90 TABLET | Refills: 0 | Status: SHIPPED | OUTPATIENT
Start: 2020-08-12 | End: 2020-08-27

## 2020-08-14 ENCOUNTER — OFFICE VISIT (OUTPATIENT)
Dept: UROLOGY | Facility: CLINIC | Age: 80
End: 2020-08-14
Payer: COMMERCIAL

## 2020-08-14 VITALS — OXYGEN SATURATION: 97 % | WEIGHT: 210 LBS | HEART RATE: 91 BPM | BODY MASS INDEX: 29.4 KG/M2 | HEIGHT: 71 IN

## 2020-08-14 DIAGNOSIS — N40.0 BPH (BENIGN PROSTATIC HYPERPLASIA): ICD-10-CM

## 2020-08-14 DIAGNOSIS — Z87.448 H/O URETHRAL STRICTURE: ICD-10-CM

## 2020-08-14 DIAGNOSIS — Z85.46 PERSONAL HISTORY OF MALIGNANT NEOPLASM OF PROSTATE: ICD-10-CM

## 2020-08-14 LAB
ALBUMIN UR-MCNC: NEGATIVE MG/DL
APPEARANCE UR: CLEAR
BILIRUB UR QL STRIP: NEGATIVE
COLOR UR AUTO: YELLOW
GLUCOSE UR STRIP-MCNC: NEGATIVE MG/DL
HGB UR QL STRIP: NEGATIVE
KETONES UR STRIP-MCNC: ABNORMAL MG/DL
LEUKOCYTE ESTERASE UR QL STRIP: NEGATIVE
NITRATE UR QL: NEGATIVE
PH UR STRIP: 5.5 PH (ref 5–7)
PSA SERPL-MCNC: <0.04 NG/ML (ref 0–4)
RESIDUAL VOLUME (RV) (EXTERNAL): 13
SOURCE: ABNORMAL
SP GR UR STRIP: >1.03 (ref 1–1.03)
UROBILINOGEN UR STRIP-ACNC: 0.2 EU/DL (ref 0.2–1)

## 2020-08-14 PROCEDURE — 99212 OFFICE O/P EST SF 10 MIN: CPT | Mod: 25 | Performed by: UROLOGY

## 2020-08-14 PROCEDURE — 36415 COLL VENOUS BLD VENIPUNCTURE: CPT | Performed by: UROLOGY

## 2020-08-14 PROCEDURE — 84153 ASSAY OF PSA TOTAL: CPT | Performed by: UROLOGY

## 2020-08-14 PROCEDURE — 81003 URINALYSIS AUTO W/O SCOPE: CPT | Performed by: UROLOGY

## 2020-08-14 PROCEDURE — 51798 US URINE CAPACITY MEASURE: CPT | Performed by: UROLOGY

## 2020-08-14 ASSESSMENT — MIFFLIN-ST. JEOR: SCORE: 1679.68

## 2020-08-14 ASSESSMENT — PAIN SCALES - GENERAL: PAINLEVEL: MILD PAIN (2)

## 2020-08-14 NOTE — PROGRESS NOTES
Mr. Bonilla is an 78-year-old gentleman who underwent combination radiation for prostate cancer years ago.  His PSA remains undetectable.  He has a normal urinalysis and a 13 cc postvoid residual.  His urine is quite concentrated.  I recommended that he drink more water during the day, up to 2 L daily to prevent dehydration.  With a larger urine volume he will have a better stream as well.  Other past medical history: History of anxiety, aortic dilation, asthma, bronchiectasis, coronary artery disease, hypertension, GERD, hoarseness, hyperglycemia, hyperlipidemia, lung nodule, overweight, osteoarthrosis, former smoker.  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  Normal respirations, neuro grossly intact  Assessment: Adenocarcinoma the prostate- no evidence for recurrence after combination radiotherapy  Plan: See yearly with PSA, urinalysis

## 2020-08-14 NOTE — LETTER
8/14/2020       RE: Romel Bonilla  [address]  [facility] [address]     Dear Colleague,    Thank you for referring your patient, Romel Bonilla, to the [facility] [place] at [facility]. Please see a copy of my visit note below.    Mr. Bonilla is an 78-year-old gentleman who underwent combination radiation for prostate cancer years ago.  His PSA remains undetectable.  He has a normal urinalysis and a 13 cc postvoid residual.  His urine is quite concentrated.  I recommended that he drink more water during the day, up to 2 L daily to prevent dehydration.  With a larger urine volume he will have a better stream as well.  Other past medical history: History of anxiety, aortic dilation, asthma, bronchiectasis, coronary artery disease, hypertension, GERD, hoarseness, hyperglycemia, hyperlipidemia, lung nodule, overweight, osteoarthrosis, former smoker.  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  Normal respirations, neuro grossly intact  Assessment: Adenocarcinoma the prostate- no evidence for recurrence after combination radiotherapy  Plan: See yearly with PSA, urinalysis    Again, thank you for allowing me to participate in the care of your patient.      Sincerely,    Ti Gentile MD

## 2020-08-14 NOTE — NURSING NOTE
Chief Complaint   Patient presents with     Prostate Cancer     PATIENT HERE TODAY FOR SD PSA AND EXAM     Slow Stream     PATIENT HERE TODAY FOR PVR       UA RESULTS:  Recent Labs   Lab Test 08/14/20  1418  05/28/16  1853   COLOR Yellow   < > Red   APPEARANCE Clear   < > Cloudy   URINEGLC Negative   < > Negative   URINEBILI Negative   < > Small  This is an unconfirmed screening test result. A positive result may be false.  *   URINEKETONE Trace*   < > Negative   SG >1.030   < > 1.015   UBLD Negative   < > Large*   URINEPH 5.5   < > 7.0   PROTEIN Negative   < > 100*   UROBILINOGEN 0.2   < > 0.2   NITRITE Negative   < > Negative   LEUKEST Negative   < > Negative   RBCU  --   --  >100*   WBCU  --   --  2-5*    < > = values in this interval not displayed.     PVR 13CC

## 2020-08-27 ENCOUNTER — OFFICE VISIT (OUTPATIENT)
Dept: FAMILY MEDICINE | Facility: CLINIC | Age: 80
End: 2020-08-27
Payer: COMMERCIAL

## 2020-08-27 ENCOUNTER — ANCILLARY PROCEDURE (OUTPATIENT)
Dept: GENERAL RADIOLOGY | Facility: CLINIC | Age: 80
End: 2020-08-27
Attending: INTERNAL MEDICINE
Payer: COMMERCIAL

## 2020-08-27 VITALS
TEMPERATURE: 97.1 F | HEART RATE: 73 BPM | SYSTOLIC BLOOD PRESSURE: 127 MMHG | HEIGHT: 71 IN | WEIGHT: 237.5 LBS | BODY MASS INDEX: 33.25 KG/M2 | OXYGEN SATURATION: 98 % | DIASTOLIC BLOOD PRESSURE: 78 MMHG

## 2020-08-27 DIAGNOSIS — E78.00 PURE HYPERCHOLESTEROLEMIA: ICD-10-CM

## 2020-08-27 DIAGNOSIS — I25.118 CORONARY ARTERY DISEASE INVOLVING NATIVE CORONARY ARTERY OF NATIVE HEART WITH OTHER FORM OF ANGINA PECTORIS (H): ICD-10-CM

## 2020-08-27 DIAGNOSIS — J44.9 CHRONIC OBSTRUCTIVE PULMONARY DISEASE, UNSPECIFIED COPD TYPE (H): ICD-10-CM

## 2020-08-27 DIAGNOSIS — R05.9 COUGH: ICD-10-CM

## 2020-08-27 DIAGNOSIS — Z00.00 ROUTINE GENERAL MEDICAL EXAMINATION AT A HEALTH CARE FACILITY: Primary | ICD-10-CM

## 2020-08-27 DIAGNOSIS — R06.2 WHEEZE: ICD-10-CM

## 2020-08-27 DIAGNOSIS — R73.9 HYPERGLYCEMIA: ICD-10-CM

## 2020-08-27 DIAGNOSIS — J45.21 MILD INTERMITTENT ASTHMA WITH EXACERBATION: ICD-10-CM

## 2020-08-27 DIAGNOSIS — C61 PROSTATE CA (H): ICD-10-CM

## 2020-08-27 DIAGNOSIS — J45.30 MILD PERSISTENT ASTHMA WITHOUT COMPLICATION: ICD-10-CM

## 2020-08-27 DIAGNOSIS — I77.810 THORACIC AORTIC ECTASIA (H): ICD-10-CM

## 2020-08-27 DIAGNOSIS — L29.9 ITCHING: ICD-10-CM

## 2020-08-27 DIAGNOSIS — I10 BENIGN ESSENTIAL HYPERTENSION: ICD-10-CM

## 2020-08-27 DIAGNOSIS — I77.810 ASCENDING AORTA DILATATION (H): ICD-10-CM

## 2020-08-27 LAB
ALBUMIN SERPL-MCNC: 3.8 G/DL (ref 3.4–5)
ALP SERPL-CCNC: 92 U/L (ref 40–150)
ALT SERPL W P-5'-P-CCNC: 33 U/L (ref 0–70)
ANION GAP SERPL CALCULATED.3IONS-SCNC: 6 MMOL/L (ref 3–14)
AST SERPL W P-5'-P-CCNC: ABNORMAL U/L (ref 0–45)
BASOPHILS # BLD AUTO: 0 10E9/L (ref 0–0.2)
BASOPHILS NFR BLD AUTO: 0.3 %
BILIRUB SERPL-MCNC: 0.8 MG/DL (ref 0.2–1.3)
BUN SERPL-MCNC: 20 MG/DL (ref 7–30)
CALCIUM SERPL-MCNC: 9 MG/DL (ref 8.5–10.1)
CHLORIDE SERPL-SCNC: 109 MMOL/L (ref 94–109)
CHOLEST SERPL-MCNC: 169 MG/DL
CO2 SERPL-SCNC: 24 MMOL/L (ref 20–32)
CREAT SERPL-MCNC: 0.85 MG/DL (ref 0.66–1.25)
DIFFERENTIAL METHOD BLD: NORMAL
EOSINOPHIL # BLD AUTO: 0.3 10E9/L (ref 0–0.7)
EOSINOPHIL NFR BLD AUTO: 4.8 %
ERYTHROCYTE [DISTWIDTH] IN BLOOD BY AUTOMATED COUNT: 13.6 % (ref 10–15)
GFR SERPL CREATININE-BSD FRML MDRD: 81 ML/MIN/{1.73_M2}
GLUCOSE SERPL-MCNC: 121 MG/DL (ref 70–99)
HBA1C MFR BLD: 5.6 % (ref 0–5.6)
HCT VFR BLD AUTO: 42.9 % (ref 40–53)
HDLC SERPL-MCNC: 81 MG/DL
HGB BLD-MCNC: 14.7 G/DL (ref 13.3–17.7)
LDLC SERPL CALC-MCNC: 69 MG/DL
LYMPHOCYTES # BLD AUTO: 1.3 10E9/L (ref 0.8–5.3)
LYMPHOCYTES NFR BLD AUTO: 21.2 %
MCH RBC QN AUTO: 32.7 PG (ref 26.5–33)
MCHC RBC AUTO-ENTMCNC: 34.3 G/DL (ref 31.5–36.5)
MCV RBC AUTO: 95 FL (ref 78–100)
MONOCYTES # BLD AUTO: 0.8 10E9/L (ref 0–1.3)
MONOCYTES NFR BLD AUTO: 12.8 %
NEUTROPHILS # BLD AUTO: 3.8 10E9/L (ref 1.6–8.3)
NEUTROPHILS NFR BLD AUTO: 60.9 %
NONHDLC SERPL-MCNC: 88 MG/DL
PLATELET # BLD AUTO: 200 10E9/L (ref 150–450)
POTASSIUM SERPL-SCNC: 4.2 MMOL/L (ref 3.4–5.3)
PROT SERPL-MCNC: 7.4 G/DL (ref 6.8–8.8)
RBC # BLD AUTO: 4.5 10E12/L (ref 4.4–5.9)
SODIUM SERPL-SCNC: 139 MMOL/L (ref 133–144)
TRIGL SERPL-MCNC: 95 MG/DL
TSH SERPL DL<=0.005 MIU/L-ACNC: 1.96 MU/L (ref 0.4–4)
WBC # BLD AUTO: 6.2 10E9/L (ref 4–11)

## 2020-08-27 PROCEDURE — 71046 X-RAY EXAM CHEST 2 VIEWS: CPT

## 2020-08-27 PROCEDURE — 80061 LIPID PANEL: CPT | Performed by: INTERNAL MEDICINE

## 2020-08-27 PROCEDURE — 83036 HEMOGLOBIN GLYCOSYLATED A1C: CPT | Performed by: INTERNAL MEDICINE

## 2020-08-27 PROCEDURE — 80053 COMPREHEN METABOLIC PANEL: CPT | Performed by: INTERNAL MEDICINE

## 2020-08-27 PROCEDURE — 84443 ASSAY THYROID STIM HORMONE: CPT | Performed by: INTERNAL MEDICINE

## 2020-08-27 PROCEDURE — 99397 PER PM REEVAL EST PAT 65+ YR: CPT | Performed by: INTERNAL MEDICINE

## 2020-08-27 PROCEDURE — 36415 COLL VENOUS BLD VENIPUNCTURE: CPT | Performed by: INTERNAL MEDICINE

## 2020-08-27 PROCEDURE — 00000402 ZZHCL STATISTIC TOTAL PROTEIN: Performed by: INTERNAL MEDICINE

## 2020-08-27 PROCEDURE — 99213 OFFICE O/P EST LOW 20 MIN: CPT | Mod: 25 | Performed by: INTERNAL MEDICINE

## 2020-08-27 PROCEDURE — 85025 COMPLETE CBC W/AUTO DIFF WBC: CPT | Performed by: INTERNAL MEDICINE

## 2020-08-27 PROCEDURE — 84165 PROTEIN E-PHORESIS SERUM: CPT | Performed by: INTERNAL MEDICINE

## 2020-08-27 RX ORDER — ATORVASTATIN CALCIUM 80 MG/1
80 TABLET, FILM COATED ORAL AT BEDTIME
Qty: 90 TABLET | Refills: 3 | Status: SHIPPED | OUTPATIENT
Start: 2020-08-27 | End: 2020-10-19

## 2020-08-27 RX ORDER — FLUTICASONE PROPIONATE AND SALMETEROL XINAFOATE 45; 21 UG/1; UG/1
2 AEROSOL, METERED RESPIRATORY (INHALATION) 2 TIMES DAILY
Qty: 3 INHALER | Refills: 3 | Status: SHIPPED | OUTPATIENT
Start: 2020-08-27 | End: 2020-10-08

## 2020-08-27 RX ORDER — MONTELUKAST SODIUM 10 MG/1
10 TABLET ORAL AT BEDTIME
Qty: 90 TABLET | Refills: 3 | Status: SHIPPED | OUTPATIENT
Start: 2020-08-27 | End: 2021-09-15

## 2020-08-27 ASSESSMENT — ACTIVITIES OF DAILY LIVING (ADL): CURRENT_FUNCTION: NO ASSISTANCE NEEDED

## 2020-08-27 ASSESSMENT — MIFFLIN-ST. JEOR: SCORE: 1804.42

## 2020-08-27 NOTE — PROGRESS NOTES
SUBJECTIVE:   Romel Bonilla is a 79 year old male who presents for Preventive Visit.    The patient is doing fine.  He has ongoing cough for years, not new or changed, no chest pain or shortness of breath.  Sometimes acts up and then gets prednisone.  Is on inhaler as noted, rare albuterol use.  No chest pain or shortness of breath.    He has had pruitis for months, no rash.    He otherwise has felt frine, has reg urol follow up for cap.  Goes to [place] in winter.  Prior asc aortic dil not seen on follow up.  Has h/o cad, no cv c/o.  Blood pressure fione.                 Past Medical History:      Past Medical History:   Diagnosis Date     Anxiety      Ascending aorta dilatation (H)     echo 7/19 did not show it, nl echo     Asthma     Dr. Lennox     Bronchiectasis (H) 03/2012    seen on ct     COPD (chronic obstructive pulmonary disease) (H)     bronchiectasis on multiple inhalers      Coronary artery disease     Cath 3/14/16- 95% ostail RCA stenosis>>CECILIO placed, fu nuclear est 5/17 nl     Essential hypertension, benign     off medications with life style     GERD (gastroesophageal reflux disease)      Groin hematoma     right- 3/2016 post angio     Gross hematuria 09/2018    ct neg, Dr. Gentile     Hoarseness      Hyperglycemia      Hyperlipidemia LDL goal <70      Lung nodule 2012    indeterminate-3 mm-needs 12 month follow up; fu done 3/16 at [facility] and benign, no fu needed     Obesity, unspecified      Osteoarthrosis, unspecified whether generalized or localized, unspecified site      Prostate CA (H) 2012    metastatic-guerita's score 7, seeds and xrt, now seeing Dr. Gentile     Urinary retention 2016    due to asa             Past Surgical History:      Past Surgical History:   Procedure Laterality Date     C NONSPECIFIC PROCEDURE  7/01    Rt knee arthroscopy and menisectomy     C NONSPECIFIC PROCEDURE  2000    RT HERNIA REPAIR     CATARACT IOL, RT/LT  2016     CYSTOSCOPY       CYSTOSCOPY, DILATE  URETHRA, COMBINED N/A 2018    Procedure: COMBINED CYSTOSCOPY, DILATE URETHRA;  VIDEO CYSTOSCOPY WITH GASCA DILATION , WITH EXTRACTION OF  PROSTATIC STONE. CAUTERY OF PROASTATIC VEINS  ;  Surgeon: Ti Gentile MD;  Location: [facility] OR     HEART CATH LEFT HEART CATH  3/14/16    95% ostail RCA stenosis>>CECILIO placed     INSERT RADIATION SEEDS PROSTATE  7/10/2012    seeds and xrt     DC TOTAL KNEE REPLACEMENT Right 2020    in [place]     ROTATOR CUFF REPAIR RT/LT               Social History:     Social History     Socioeconomic History     Marital status:      Spouse name: Not on file     Number of children: 4     Years of education: Not on file     Highest education level: Not on file   Occupational History     Occupation: sold insurance   Social Needs     Financial resource strain: Not on file     Food insecurity     Worry: Not on file     Inability: Not on file     Transportation needs     Medical: Not on file     Non-medical: Not on file   Tobacco Use     Smoking status: Former Smoker     Packs/day: 1.00     Years: 20.00     Pack years: 20.00     Types: Cigarettes     Last attempt to quit: 1994     Years since quittin.6     Smokeless tobacco: Never Used   Substance and Sexual Activity     Alcohol use: Yes     Alcohol/week: 0.0 standard drinks     Comment: occ beer     Drug use: No     Sexual activity: Yes     Partners: Female   Lifestyle     Physical activity     Days per week: Not on file     Minutes per session: Not on file     Stress: Not on file   Relationships     Social connections     Talks on phone: Not on file     Gets together: Not on file     Attends Zoroastrianism service: Not on file     Active member of club or organization: Not on file     Attends meetings of clubs or organizations: Not on file     Relationship status: Not on file     Intimate partner violence     Fear of current or ex partner: Not on file     Emotionally abused: Not on file     Physically abused: Not on  file     Forced sexual activity: Not on file   Other Topics Concern     Parent/sibling w/ CABG, MI or angioplasty before 65F 55M? Not Asked      Service Not Asked     Blood Transfusions Not Asked     Caffeine Concern No     Comment: occ      Occupational Exposure Not Asked     Hobby Hazards Not Asked     Sleep Concern Yes     Comment: due to wife passing away a month ago     Stress Concern No     Weight Concern No     Special Diet No     Back Care Not Asked     Exercise No     Comment: not currently, starting cardiac rehab soon      Bike Helmet Not Asked     Seat Belt Yes     Self-Exams Not Asked   Social History Narrative     Not on file             Family History:   reviewed         Allergies:     Allergies   Allergen Reactions     Cephalosporins Unknown     Cefprozil Diarrhea     Plavix [Clopidogrel] Itching             Medications:     Current Outpatient Medications   Medication Sig Dispense Refill     Acetaminophen (TYLENOL PO) Take by mouth every 6 hours as needed for mild pain or fever       aspirin (ASA) 81 MG EC tablet Take 1 tablet (81 mg) by mouth daily       atorvastatin (LIPITOR) 80 MG tablet Take 1 tablet (80 mg) by mouth At Bedtime 90 tablet 3     cetirizine (ZYRTEC) 10 MG tablet Take 1 tablet (10 mg) by mouth every evening 30 tablet 1     cinnamon 500 MG CAPS Take 1 capsule by mouth daily       fluticasone (FLONASE) 50 MCG/ACT spray Spray 1-2 sprays into both nostrils daily 1 Bottle 3     fluticasone-salmeterol (ADVAIR-HFA) 45-21 MCG/ACT inhaler Inhale 2 puffs into the lungs 2 times daily 3 Inhaler 3     montelukast (SINGULAIR) 10 MG tablet Take 1 tablet (10 mg) by mouth At Bedtime 90 tablet 3     Multiple Vitamins-Minerals (MULTIVITAL PO) Take 1 tablet by mouth daily       nitroglycerin (NITROSTAT) 0.4 MG SL tablet Place 1 tablet (0.4 mg) under the tongue every 5 minutes as needed for chest pain if you are still having symptoms after 3 doses (15 minutes) call 911. 25 tablet 1     PROAIR HFA  "108 (90 Base) MCG/ACT inhaler INHALE 2 PUFFS INTO THE LUNGS EVERY 4 HOURS AS NEEDED FOR SHORTNESS OF BREATH OR DIFFICULT BREATHING OR WHEEZING (Patient not taking: Reported on 8/14/2020) 8.5 g 1               Review of Systems:   The 10 point Review of Systems is negative other than noted in the HPI           Physical Exam:   Blood pressure 127/78, pulse 73, temperature 97.1  F (36.2  C), temperature source Temporal, height 1.803 m (5' 11\"), weight 107.7 kg (237 lb 8 oz), SpO2 98 %.    Exam:  Constitutional: healthy appearing, alert and in no distress  Heent: Normocephalic. Head without obvious masses or lesions. PERRLDC, EOMI. Mouth exam within normal limits: tongue, mucous membranes, posterior pharynx all normal, no lesions or abnormalities seen.  Tm's and canals within normal limits bilaterally. Neck supple, no nuchal rigidity or masses. No supraclavicular, or cervical adenopathy. Thyroid symmetric, no masses.  Cardiovascular: Regular rate and rhythm, occasionally extra beat, no murmer, rub or gallops.  JVP not elevated, no edema.  Carotids within normal limits bilaterally, no bruits.  Respiratory: Normal respiratory effort.  Lungs clear, normal flow, no wheezing or crackles.  Breasts: Normal bilaterally.  No masses or lesions.  Nipples within normal limites.  No axillary lesions or nodes.  Gastrointestinal: Normal active bowel sounds.   Soft, not tender, no masses, guarding or rebound.  No hepatosplenomegaly.   Genitourinary: Rectal not done  Musculoskeletal: extremities normal, no gross deformities noted.  Skin: no suspicious lesions or rashes   Neurologic: Mental status within normal limits.  Speech fluent.  No gross motor abnormalities and gait intact.  Psychiatric: mentation appears normal and affect normal.         Data:   Labs sent        Assessment:   1. Normal complete physical exam  2. Chronic cough, suspect copd/asthma  3. asc aortic dil, no follow up needed  4. Cad, med mgmt  5. Cap, gisella, follow up " "urol  6. Asthma, copd, stable  7. Pruitis, ?cause, check labs  8. Elevated cholesterol, exercise, diet and weight loss, labs today  9. Hypertension, controlled  10. Elevated cholesterol on statin  11. hcm         Plan:   shingrix at pharm  Exercise, diet and weight loss  Letter with labs      Zac Martínez M.D.              Are you in the first 12 months of your Medicare coverage?  No    Healthy Habits:    In general, how would you rate your overall health?  Good    Frequency of exercise:  2-3 days/week    Duration of exercise:  30-45 minutes    Do you usually eat at least 4 servings of fruit and vegetables a day, include whole grains    & fiber and avoid regularly eating high fat or \"junk\" foods?  Yes    Taking medications regularly:  Yes    Barriers to taking medications:  None    Medication side effects:  None    Ability to successfully perform activities of daily living:  No assistance needed    Home Safety:  No safety concerns identified    Hearing Impairment:  No hearing concerns    PHQ-2 Total Score:    Do you feel safe in your environment? Yes    Have you ever done Advance Care Planning? (For example, a Health Directive, POLST, or a discussion with a medical provider or your loved ones about your wishes): Yes, patient states has an Advance Care Planning document and will bring a copy to the clinic.      Fall risk  Fallen 2 or more times in the past year?: No  Any fall with injury in the past year?: No    Cognitive Screening   1) Repeat 3 items (Leader, Season, Table)    2) Clock draw: NORMAL  3) 3 item recall: Recalls 1 object   Results: NORMAL clock, 1-2 items recalled: COGNITIVE IMPAIRMENT LESS LIKELY    Mini-CogTM Copyright SHERI Almazan. Licensed by the author for use in [facility]; reprinted with permission (dontae@.[facility]). All rights reserved.      Do you have sleep apnea, excessive snoring or daytime drowsiness?: no    Reviewed and updated as needed this visit by clinical staff         Reviewed " "and updated as needed this visit by Provider        Social History     Tobacco Use     Smoking status: Former Smoker     Packs/day: 1.00     Years: 20.00     Pack years: 20.00     Types: Cigarettes     Last attempt to quit: 1994     Years since quittin.6     Smokeless tobacco: Never Used   Substance Use Topics     Alcohol use: Yes     Alcohol/week: 0.0 standard drinks     Comment: occ beer     If you drink alcohol do you typically have >3 drinks per day or >7 drinks per week? No    Alcohol Use 2019   Prescreen: >3 drinks/day or >7 drinks/week? No               Current providers sharing in care for this patient include:   Patient Care Team:  Zac Martínez MD as PCP - General (Internal Medicine)  Ti Gentile MD as MD (Urology)  Zac Martínez MD as Assigned PCP    The following health maintenance items are reviewed in Epic and correct as of today:  Health Maintenance   Topic Date Due     COPD ACTION PLAN  1940     ZOSTER IMMUNIZATION (2 of 3) 2010     ADVANCE CARE PLANNING  2017     MEDICARE ANNUAL WELLNESS VISIT  2020     INFLUENZA VACCINE (1) 2020     FALL RISK ASSESSMENT  2021     DTAP/TDAP/TD IMMUNIZATION (2 - Td) 2022     SPIROMETRY  Completed     PHQ-2  Completed     PNEUMOCOCCAL IMMUNIZATION 65+ LOW/MEDIUM RISK  Completed     IPV IMMUNIZATION  Aged Out     MENINGITIS IMMUNIZATION  Aged Out     HEPATITIS B IMMUNIZATION  Aged Out           Review of Systems      OBJECTIVE:   There were no vitals taken for this visit. Estimated body mass index is 29.29 kg/m  as calculated from the following:    Height as of 20: 1.803 m (5' 11\").    Weight as of 20: 95.3 kg (210 lb).  Physical Exam          ASSESSMENT / PLAN:       COUNSELING:  Reviewed preventive health counseling, as reflected in patient instructions       Regular exercise       Healthy diet/nutrition    Estimated body mass index is 29.29 kg/m  as calculated from the " "following:    Height as of 8/14/20: 1.803 m (5' 11\").    Weight as of 8/14/20: 95.3 kg (210 lb).    Weight management plan: Discussed healthy diet and exercise guidelines    He reports that he quit smoking about 26 years ago. His smoking use included cigarettes. He has a 20.00 pack-year smoking history. He has never used smokeless tobacco.      Appropriate preventive services were discussed with this patient, including applicable screening as appropriate for cardiovascular disease, diabetes, osteopenia/osteoporosis, and glaucoma.  As appropriate for age/gender, discussed screening for colorectal cancer, prostate cancer, breast cancer, and cervical cancer. Checklist reviewing preventive services available has been given to the patient.    Reviewed patients plan of care and provided an AVS. The Basic Care Plan (routine screening as documented in Health Maintenance) for Romel meets the Care Plan requirement. This Care Plan has been established and reviewed with the Patient.    Counseling Resources:  ATP IV Guidelines  Pooled Cohorts Equation Calculator  Breast Cancer Risk Calculator  Breast Cancer: Medication to Reduce Risk  FRAX Risk Assessment  ICSI Preventive Guidelines  Dietary Guidelines for Americans, 2010  USDA's MyPlate  ASA Prophylaxis  Lung CA Screening    Zac Martínez MD  [facility]    Identified Health Risks:  "

## 2020-08-28 LAB
ALBUMIN SERPL ELPH-MCNC: 4.1 G/DL (ref 3.7–5.1)
ALPHA1 GLOB SERPL ELPH-MCNC: 0.3 G/DL (ref 0.2–0.4)
ALPHA2 GLOB SERPL ELPH-MCNC: 0.8 G/DL (ref 0.5–0.9)
B-GLOBULIN SERPL ELPH-MCNC: 0.9 G/DL (ref 0.6–1)
GAMMA GLOB SERPL ELPH-MCNC: 1 G/DL (ref 0.7–1.6)
M PROTEIN SERPL ELPH-MCNC: 0.1 G/DL
PROT PATTERN SERPL ELPH-IMP: ABNORMAL

## 2020-09-03 NOTE — RESULT ENCOUNTER NOTE
Mr. Bonilla,    I want to apologize for the delay in reporting your labs.  They should be available for you to view.  They look quite good.    Your CBC or complete blood count is normal showing no signs of anemia or blood disorders.    Your chemistry panel shows a slightly elevated blood sugar.  However, the second diabetes test called the hemoglobin A1c is normal.  The bottom line is that you do not have diabetes but may be at slightly higher risk of developing it.  The best way to keep the sugar down is regular exercise and keeping your weight down.  We should check it again next year.    Your blood salts, kidney tests, liver tests, and thyroid test are all normal.  Your cholesterol is wonderful.      On the protein electrophoresis which is a test to look at your proteins there was one very minor abnormality.  I am not at all worried about this but I do want to follow-up and repeat it [place] to [place].  It is nothing serious and sometimes the protein can go up and down.  I would ask that you come in just for a blood and urine study a week prior to leaving.  You do not need to fast and you do not need to see me for this but please schedule it on my chart or call ahead to schedule it.    Your chest x-ray also is fine.    I am happy to bring you this overall excellent report.  If you have any questions let me know.    Zac Martínez M.D.

## 2020-09-11 ENCOUNTER — VIRTUAL VISIT (OUTPATIENT)
Dept: CARDIOLOGY | Facility: CLINIC | Age: 80
End: 2020-09-11
Payer: COMMERCIAL

## 2020-09-11 DIAGNOSIS — I25.118 CORONARY ARTERY DISEASE INVOLVING NATIVE CORONARY ARTERY OF NATIVE HEART WITH OTHER FORM OF ANGINA PECTORIS (H): Primary | ICD-10-CM

## 2020-09-11 DIAGNOSIS — E78.00 PURE HYPERCHOLESTEROLEMIA: ICD-10-CM

## 2020-09-11 PROCEDURE — 99213 OFFICE O/P EST LOW 20 MIN: CPT | Mod: 95 | Performed by: INTERNAL MEDICINE

## 2020-09-11 RX ORDER — IBUPROFEN 200 MG
400 TABLET ORAL 2 TIMES DAILY
COMMUNITY

## 2020-09-11 NOTE — LETTER
"9/11/2020    Zac Martínez MD  [address]  [facility] [address]    RE: Romel Bonilla       Dear Colleague,    I had the pleasure of seeing Romel Bonilla in the [facility].    Romel Bonilla is a 79 year old male who is being evaluated via a billable video visit.      The patient has been notified of following:     \"This video visit will be conducted via a call between you and your physician/provider. We have found that certain health care needs can be provided without the need for an in-person physical exam.  This service lets us provide the care you need with a video conversation.  If a prescription is necessary we can send it directly to your pharmacy.  If lab work is needed we can place an order for that and you can then stop by our lab to have the test done at a later time.    Video visits are billed at different rates depending on your insurance coverage.  Please reach out to your insurance provider with any questions.    If during the course of the call the physician/provider feels a video visit is not appropriate, you will not be charged for this service.\"  Vitals - Patient Reported  Systolic (Patient Reported): 126  Diastolic (Patient Reported): 72(8/28)  Weight (Patient Reported): 96.2 kg (212 lb)  Height (Patient Reported): 180.3 cm (5' 11\")  BMI (Based on Pt Reported Ht/Wt): 29.57  Pulse (Patient Reported): 68      Review Of Systems  Skin: NEGATIVE  Eyes:Ears/Nose/Throat: NEGATIVE  Respiratory: cough  Cardiovascular: edema, ankles, energy level good  Gastrointestinal: NEGATIVE  Genitourinary:NEGATIVE   Musculoskeletal: arthritis, R knee replacement 2/20  Neurologic: NEGATIVE  Psychiatric: NEGATIVE  Hematologic/Lymphatic/Immunologic: NEGATIVE  Endocrine:  NEGATIVE    Chely Cast LPN    Patient has given verbal consent for Video visit? Yes  How would you like to obtain your AVS? MyChart  If you are dropped from the video visit, the video invite " should be resent to: Text to cell phone: [phone]  Will anyone else be joining your video visit? No        Video-Visit Details    Type of service:  Video Visit    Video Start Time: 8:35 AM  Video End Time: 8:48 AM    Originating Location (pt. Location): Home    Distant Location (provider location):  [facility]     Platform used for Video Visit: JULIA Angeles MD    HPI and Plan:   See dictation    No orders of the defined types were placed in this encounter.    Orders Placed This Encounter   Medications     ibuprofen (ADVIL/MOTRIN) 200 MG tablet     Sig: Take 400 mg by mouth 2 times daily     There are no discontinued medications.      No diagnosis found.    CURRENT MEDICATIONS:  Current Outpatient Medications   Medication Sig Dispense Refill     aspirin (ASA) 81 MG EC tablet Take 1 tablet (81 mg) by mouth daily       atorvastatin (LIPITOR) 80 MG tablet Take 1 tablet (80 mg) by mouth At Bedtime 90 tablet 3     cetirizine (ZYRTEC) 10 MG tablet Take 1 tablet (10 mg) by mouth every evening 30 tablet 1     cinnamon 500 MG CAPS Take 1 capsule by mouth daily       fluticasone (FLONASE) 50 MCG/ACT spray Spray 1-2 sprays into both nostrils daily 1 Bottle 3     fluticasone-salmeterol (ADVAIR-HFA) 45-21 MCG/ACT inhaler Inhale 2 puffs into the lungs 2 times daily 3 Inhaler 3     ibuprofen (ADVIL/MOTRIN) 200 MG tablet Take 400 mg by mouth 2 times daily       montelukast (SINGULAIR) 10 MG tablet Take 1 tablet (10 mg) by mouth At Bedtime 90 tablet 3     Multiple Vitamins-Minerals (MULTIVITAL PO) Take 1 tablet by mouth daily       nitroglycerin (NITROSTAT) 0.4 MG SL tablet Place 1 tablet (0.4 mg) under the tongue every 5 minutes as needed for chest pain if you are still having symptoms after 3 doses (15 minutes) call 911. 25 tablet 1     Acetaminophen (TYLENOL PO) Take by mouth every 6 hours as needed for mild pain or fever       PROAIR  (90 Base) MCG/ACT inhaler  INHALE 2 PUFFS INTO THE LUNGS EVERY 4 HOURS AS NEEDED FOR SHORTNESS OF BREATH OR DIFFICULT BREATHING OR WHEEZING (Patient not taking: Reported on 8/14/2020) 8.5 g 1       ALLERGIES     Allergies   Allergen Reactions     Cephalosporins Unknown     Cefprozil Diarrhea     Plavix [Clopidogrel] Itching       PAST MEDICAL HISTORY:  Past Medical History:   Diagnosis Date     Anxiety      Ascending aorta dilatation (H)     echo 7/19 did not show it, nl echo     Asthma     Dr. Lennox     Bronchiectasis (H) 03/2012    seen on ct     COPD (chronic obstructive pulmonary disease) (H)     bronchiectasis on multiple inhalers      Coronary artery disease     Cath 3/14/16- 95% ostail RCA stenosis>>CECILIO placed, fu nuclear est 5/17 nl     Essential hypertension, benign     off medications with life style     GERD (gastroesophageal reflux disease)      Groin hematoma     right- 3/2016 post angio     Gross hematuria 09/2018    ct neg, Dr. Gentile     Hoarseness      Hyperglycemia      Hyperlipidemia LDL goal <70      Lung nodule 2012    indeterminate-3 mm-needs 12 month follow up; fu done 3/16 at [facility] and benign, no fu needed     Obesity, unspecified      Osteoarthrosis, unspecified whether generalized or localized, unspecified site      Prostate CA (H) 2012    metastatic-guerita's score 7, seeds and xrt, now seeing Dr. Gentile     Urinary retention 2016    due to asa       PAST SURGICAL HISTORY:  Past Surgical History:   Procedure Laterality Date     C NONSPECIFIC PROCEDURE  7/01    Rt knee arthroscopy and menisectomy     C NONSPECIFIC PROCEDURE  2000    RT HERNIA REPAIR     CATARACT IOL, RT/LT  2016     CYSTOSCOPY       CYSTOSCOPY, DILATE URETHRA, COMBINED N/A 9/19/2018    Procedure: COMBINED CYSTOSCOPY, DILATE URETHRA;  VIDEO CYSTOSCOPY WITH GASCA DILATION , WITH EXTRACTION OF  PROSTATIC STONE. CAUTERY OF PROASTATIC VEINS  ;  Surgeon: Ti Gentile MD;  Location: [facility] OR     HEART CATH LEFT HEART CATH  3/14/16    95% ostail RCA  stenosis>>CECILIO placed     INSERT RADIATION SEEDS PROSTATE  7/10/2012    seeds and xrt     UT TOTAL KNEE REPLACEMENT Right 2020    in [place]     ROTATOR CUFF REPAIR RT/LT  2016       FAMILY HISTORY:  Family History   Problem Relation Age of Onset     Myocardial Infarction Mother      Other - See Comments Mother         Angina     Myocardial Infarction Father      Arthritis Father      Family History Negative Sister      Lupus Sister      Obesity Sister      Cancer Brother      Family History Negative Son      Family History Negative Son      Family History Negative Son      Family History Negative Son      Unknown/Adopted Maternal Grandmother      Myocardial Infarction Maternal Grandfather         40s or 50s     Unknown/Adopted Paternal Grandmother      Unknown/Adopted Paternal Grandfather        SOCIAL HISTORY:  Social History     Socioeconomic History     Marital status:      Spouse name: None     Number of children: 4     Years of education: None     Highest education level: None   Occupational History     Occupation: sold insurance   Social Needs     Financial resource strain: None     Food insecurity     Worry: None     Inability: None     Transportation needs     Medical: None     Non-medical: None   Tobacco Use     Smoking status: Former Smoker     Packs/day: 1.00     Years: 20.00     Pack years: 20.00     Types: Cigarettes     Last attempt to quit: 1994     Years since quittin.6     Smokeless tobacco: Never Used   Substance and Sexual Activity     Alcohol use: Yes     Alcohol/week: 0.0 standard drinks     Comment: occ beer     Drug use: No     Sexual activity: Yes     Partners: Female   Lifestyle     Physical activity     Days per week: None     Minutes per session: None     Stress: None   Relationships     Social connections     Talks on phone: None     Gets together: None     Attends Denominational service: None     Active member of club or organization: None     Attends meetings of clubs or  organizations: None     Relationship status: None     Intimate partner violence     Fear of current or ex partner: None     Emotionally abused: None     Physically abused: None     Forced sexual activity: None   Other Topics Concern     Parent/sibling w/ CABG, MI or angioplasty before 65F 55M? Not Asked      Service Not Asked     Blood Transfusions Not Asked     Caffeine Concern No     Comment: occ      Occupational Exposure Not Asked     Hobby Hazards Not Asked     Sleep Concern Yes     Comment: due to wife passing away a month ago     Stress Concern No     Weight Concern No     Special Diet No     Back Care Not Asked     Exercise No     Comment: not currently, starting cardiac rehab soon      Bike Helmet Not Asked     Seat Belt Yes     Self-Exams Not Asked   Social History Narrative     None       Physical Exam:  Vitals: There were no vitals taken for this visit.      General:  no apparent distress, normal body habitus, sitting upright.  ENT/Mouth:  membranes moist, no nasal discharge.  Normal head shape, no apparent injury or laceration.  Eyes:  no scleral icterus, normal conjunctivae.  No observed jaundice.  Neck:  no apparent neck swelling.   Chest/Lungs:  No breathing difficulty while speaking.  No audible wheezing.  No cough during conversation.  Cardiovascular:  No obviously elevated jugular venous pressure.  No apparent edema bilaterally in LE.   Abdomen:  no obvious abdominal distention.   Extremities:  no apparent cyanosis.  Skin:  no xanthelasma.  No facial lacerations.  Neurologic:  Normal arm motion bilateral, no tremors.    Psychiatric:  Alert and oriented x3, calm demeanor    The rest of the comprehensive physical examination is deferred due to public health emergency video visit restrictions.    Recent Lab Results:  LIPID RESULTS:  Lab Results   Component Value Date    CHOL 169 08/27/2020    HDL 81 08/27/2020    LDL 69 08/27/2020    TRIG 95 08/27/2020    CHOLHDLRATIO 2.7 03/25/2013        LIVER ENZYME RESULTS:  Lab Results   Component Value Date    AST Unsatisfactory specimen - hemolyzed 08/27/2020    ALT 33 08/27/2020       CBC RESULTS:  Lab Results   Component Value Date    WBC 6.2 08/27/2020    RBC 4.50 08/27/2020    HGB 14.7 08/27/2020    HCT 42.9 08/27/2020    MCV 95 08/27/2020    MCH 32.7 08/27/2020    MCHC 34.3 08/27/2020    RDW 13.6 08/27/2020     08/27/2020       BMP RESULTS:  Lab Results   Component Value Date     08/27/2020    POTASSIUM 4.2 08/27/2020    CHLORIDE 109 08/27/2020    CO2 24 08/27/2020    ANIONGAP 6 08/27/2020     (H) 08/27/2020    BUN 20 08/27/2020    CR 0.85 08/27/2020    GFRESTIMATED 81 08/27/2020    GFRESTBLACK >90 08/27/2020    NNAMID 9.0 08/27/2020        A1C RESULTS:  Lab Results   Component Value Date    A1C 5.6 08/27/2020       INR RESULTS:  Lab Results   Component Value Date    INR 0.96 03/14/2016    INR 1.58 (H) 08/22/2008       Service Date: 09/11/2020      VIRTUAL VISIT       CARDIOLOGY OFFICE PROGRESS NOTE      HISTORY OF PRESENT ILLNESS:  I had the opportunity to see Mr. Romel Bonilla for a virtual video visit at [facility] in [place] for reevaluation of coronary artery disease and dyslipidemia.  Mr. Bonilla is an 79-year-old gentleman who had been followed for many years by Dr. Mathew before his departure.  He has a history of coronary artery disease and underwent angioplasty and stenting of the right coronary ostium in 03/2016.  His cardiac risk factors include only hypercholesterolemia.      When I saw him last year, he had reported an allergy to aspirin and Plavix and was taking Brilinta long term.  However, his aspirin allergy was atypical, and I suggested he try it again.  He was able to stop Brilinta and is now taking aspirin 81 mg daily without problems.  His LDL levels were up to 94 last year, and I suggested that he double his atorvastatin from 40-80 mg a day.  As a result, his cholesterol numbers are  now much improved.  His LDL is 69, HDL 81, triglycerides 95 and total cholesterol 169.  His basic metabolic panel is normal, and hemoglobin is normal.      He continues to feel good.  He is having no concerning cardiac symptoms.  He plays golf 3 times a week and rides a cart, but walks up and down some hills on the fairway without too much difficulty.  He got a new knee in February, and that has improved his activity level.  Fortunately, it has not resulted in any new cardiac symptoms.      He is taking no medication for hypertension.  His blood pressure on 08/27/2020 was 127/78, heart rate 73, and his weight was about 213 pounds.      ALLERGIES:  Reported to Plavix.      IMPRESSIONS:  Mr. Romel Bonilla is an 79-year-old gentleman with a history of coronary artery disease, including stenting of the ostial right coronary artery in 2016.  Fortunately, he has done very well since then without any significant cardiac symptoms or evidence of recurrent coronary artery disease.  At the time of his coronary angiogram, he had no other significant coronary blockages, although he did have some mild scattered plaque with at most 40% stenosis in the first diagonal artery.      He is on no medications to lower his blood pressure, and his blood pressure numbers consistently are excellent.  His cholesterol numbers are much improved after increasing his Lipitor.      I am glad that he is doing well and remains asymptomatic.  We talked about doing periodic stress testing, and I did not recommend that we do that this year since he is feeling well.  We will talk about that again next year when I meet with him.  We will repeat some laboratory studies at that time.  I will not make any medication changes, and we will contact him for that appointment next year.      cc:   Zac [place] [address]         JULIA BOND MD, [facility]         D: 09/11/2020   T: 09/11/2020   MT:  mm      Name:     WESLEY SPARKS   MRN:      [id number]        Account:      [id number]   :      1940           Service Date: 2020      Document: [id number]         Thank you for allowing me to participate in the care of your patient.    Sincerely,     JULIA BOND MD     [facility]

## 2020-09-11 NOTE — PROGRESS NOTES
"Romel Bonilla is a 79 year old male who is being evaluated via a billable video visit.      The patient has been notified of following:     \"This video visit will be conducted via a call between you and your physician/provider. We have found that certain health care needs can be provided without the need for an in-person physical exam.  This service lets us provide the care you need with a video conversation.  If a prescription is necessary we can send it directly to your pharmacy.  If lab work is needed we can place an order for that and you can then stop by our lab to have the test done at a later time.    Video visits are billed at different rates depending on your insurance coverage.  Please reach out to your insurance provider with any questions.    If during the course of the call the physician/provider feels a video visit is not appropriate, you will not be charged for this service.\"  Vitals - Patient Reported  Systolic (Patient Reported): 126  Diastolic (Patient Reported): 72(8/28)  Weight (Patient Reported): 96.2 kg (212 lb)  Height (Patient Reported): 180.3 cm (5' 11\")  BMI (Based on Pt Reported Ht/Wt): 29.57  Pulse (Patient Reported): 68      Review Of Systems  Skin: NEGATIVE  Eyes:Ears/Nose/Throat: NEGATIVE  Respiratory: cough  Cardiovascular: edema, ankles, energy level good  Gastrointestinal: NEGATIVE  Genitourinary:NEGATIVE   Musculoskeletal: arthritis, R knee replacement 2/20  Neurologic: NEGATIVE  Psychiatric: NEGATIVE  Hematologic/Lymphatic/Immunologic: NEGATIVE  Endocrine:  NEGATIVE    Chely Cast LPN    Patient has given verbal consent for Video visit? Yes  How would you like to obtain your AVS? MyChart  If you are dropped from the video visit, the video invite should be resent to: Text to cell phone: [phone]  Will anyone else be joining your video visit? No        Video-Visit Details    Type of service:  Video Visit    Video Start Time: 8:35 AM  Video End Time: 8:48 " AM    Originating Location (pt. Location): Home    Distant Location (provider location):  [facility]     Platform used for Video Visit: JULIA Angeles MD    HPI and Plan:   See dictation    No orders of the defined types were placed in this encounter.    Orders Placed This Encounter   Medications     ibuprofen (ADVIL/MOTRIN) 200 MG tablet     Sig: Take 400 mg by mouth 2 times daily     There are no discontinued medications.      No diagnosis found.    CURRENT MEDICATIONS:  Current Outpatient Medications   Medication Sig Dispense Refill     aspirin (ASA) 81 MG EC tablet Take 1 tablet (81 mg) by mouth daily       atorvastatin (LIPITOR) 80 MG tablet Take 1 tablet (80 mg) by mouth At Bedtime 90 tablet 3     cetirizine (ZYRTEC) 10 MG tablet Take 1 tablet (10 mg) by mouth every evening 30 tablet 1     cinnamon 500 MG CAPS Take 1 capsule by mouth daily       fluticasone (FLONASE) 50 MCG/ACT spray Spray 1-2 sprays into both nostrils daily 1 Bottle 3     fluticasone-salmeterol (ADVAIR-HFA) 45-21 MCG/ACT inhaler Inhale 2 puffs into the lungs 2 times daily 3 Inhaler 3     ibuprofen (ADVIL/MOTRIN) 200 MG tablet Take 400 mg by mouth 2 times daily       montelukast (SINGULAIR) 10 MG tablet Take 1 tablet (10 mg) by mouth At Bedtime 90 tablet 3     Multiple Vitamins-Minerals (MULTIVITAL PO) Take 1 tablet by mouth daily       nitroglycerin (NITROSTAT) 0.4 MG SL tablet Place 1 tablet (0.4 mg) under the tongue every 5 minutes as needed for chest pain if you are still having symptoms after 3 doses (15 minutes) call 911. 25 tablet 1     Acetaminophen (TYLENOL PO) Take by mouth every 6 hours as needed for mild pain or fever       PROAIR  (90 Base) MCG/ACT inhaler INHALE 2 PUFFS INTO THE LUNGS EVERY 4 HOURS AS NEEDED FOR SHORTNESS OF BREATH OR DIFFICULT BREATHING OR WHEEZING (Patient not taking: Reported on 8/14/2020) 8.5 g 1       ALLERGIES     Allergies   Allergen Reactions      Cephalosporins Unknown     Cefprozil Diarrhea     Plavix [Clopidogrel] Itching       PAST MEDICAL HISTORY:  Past Medical History:   Diagnosis Date     Anxiety      Ascending aorta dilatation (H)     echo 7/19 did not show it, nl echo     Asthma     Dr. Lennox     Bronchiectasis (H) 03/2012    seen on ct     COPD (chronic obstructive pulmonary disease) (H)     bronchiectasis on multiple inhalers      Coronary artery disease     Cath 3/14/16- 95% ostail RCA stenosis>>CECILIO placed, fu nuclear est 5/17 nl     Essential hypertension, benign     off medications with life style     GERD (gastroesophageal reflux disease)      Groin hematoma     right- 3/2016 post angio     Gross hematuria 09/2018    ct neg, Dr. Gentile     Hoarseness      Hyperglycemia      Hyperlipidemia LDL goal <70      Lung nodule 2012    indeterminate-3 mm-needs 12 month follow up; fu done 3/16 at [facility] and benign, no fu needed     Obesity, unspecified      Osteoarthrosis, unspecified whether generalized or localized, unspecified site      Prostate CA (H) 2012    metastatic-guerita's score 7, seeds and xrt, now seeing Dr. Gentile     Urinary retention 2016    due to asa       PAST SURGICAL HISTORY:  Past Surgical History:   Procedure Laterality Date     C NONSPECIFIC PROCEDURE  7/01    Rt knee arthroscopy and menisectomy     C NONSPECIFIC PROCEDURE  2000    RT HERNIA REPAIR     CATARACT IOL, RT/LT  2016     CYSTOSCOPY       CYSTOSCOPY, DILATE URETHRA, COMBINED N/A 9/19/2018    Procedure: COMBINED CYSTOSCOPY, DILATE URETHRA;  VIDEO CYSTOSCOPY WITH GASCA DILATION , WITH EXTRACTION OF  PROSTATIC STONE. CAUTERY OF PROASTATIC VEINS  ;  Surgeon: Ti Gentile MD;  Location: [facility] OR     HEART CATH LEFT HEART CATH  3/14/16    95% ostail RCA stenosis>>CECILIO placed     INSERT RADIATION SEEDS PROSTATE  7/10/2012    seeds and xrt     HI TOTAL KNEE REPLACEMENT Right 02/2020    in [place]     ROTATOR CUFF REPAIR RT/LT  2016       FAMILY HISTORY:  Family History    Problem Relation Age of Onset     Myocardial Infarction Mother      Other - See Comments Mother         Angina     Myocardial Infarction Father      Arthritis Father      Family History Negative Sister      Lupus Sister      Obesity Sister      Cancer Brother      Family History Negative Son      Family History Negative Son      Family History Negative Son      Family History Negative Son      Unknown/Adopted Maternal Grandmother      Myocardial Infarction Maternal Grandfather         40s or 50s     Unknown/Adopted Paternal Grandmother      Unknown/Adopted Paternal Grandfather        SOCIAL HISTORY:  Social History     Socioeconomic History     Marital status:      Spouse name: None     Number of children: 4     Years of education: None     Highest education level: None   Occupational History     Occupation: sold insurance   Social Needs     Financial resource strain: None     Food insecurity     Worry: None     Inability: None     Transportation needs     Medical: None     Non-medical: None   Tobacco Use     Smoking status: Former Smoker     Packs/day: 1.00     Years: 20.00     Pack years: 20.00     Types: Cigarettes     Last attempt to quit: 1994     Years since quittin.6     Smokeless tobacco: Never Used   Substance and Sexual Activity     Alcohol use: Yes     Alcohol/week: 0.0 standard drinks     Comment: occ beer     Drug use: No     Sexual activity: Yes     Partners: Female   Lifestyle     Physical activity     Days per week: None     Minutes per session: None     Stress: None   Relationships     Social connections     Talks on phone: None     Gets together: None     Attends [employer] service: None     Active member of club or organization: None     Attends meetings of clubs or organizations: None     Relationship status: None     Intimate partner violence     Fear of current or ex partner: None     Emotionally abused: None     Physically abused: None     Forced sexual activity: None   Other  Topics Concern     Parent/sibling w/ CABG, MI or angioplasty before 65F 55M? Not Asked      Service Not Asked     Blood Transfusions Not Asked     Caffeine Concern No     Comment: occ      Occupational Exposure Not Asked     Hobby Hazards Not Asked     Sleep Concern Yes     Comment: due to wife passing away a month ago     Stress Concern No     Weight Concern No     Special Diet No     Back Care Not Asked     Exercise No     Comment: not currently, starting cardiac rehab soon      Bike Helmet Not Asked     Seat Belt Yes     Self-Exams Not Asked   Social History Narrative     None       Physical Exam:  Vitals: There were no vitals taken for this visit.      General:  no apparent distress, normal body habitus, sitting upright.  ENT/Mouth:  membranes moist, no nasal discharge.  Normal head shape, no apparent injury or laceration.  Eyes:  no scleral icterus, normal conjunctivae.  No observed jaundice.  Neck:  no apparent neck swelling.   Chest/Lungs:  No breathing difficulty while speaking.  No audible wheezing.  No cough during conversation.  Cardiovascular:  No obviously elevated jugular venous pressure.  No apparent edema bilaterally in LE.   Abdomen:  no obvious abdominal distention.   Extremities:  no apparent cyanosis.  Skin:  no xanthelasma.  No facial lacerations.  Neurologic:  Normal arm motion bilateral, no tremors.    Psychiatric:  Alert and oriented x3, calm demeanor    The rest of the comprehensive physical examination is deferred due to public health emergency video visit restrictions.    Recent Lab Results:  LIPID RESULTS:  Lab Results   Component Value Date    CHOL 169 08/27/2020    HDL 81 08/27/2020    LDL 69 08/27/2020    TRIG 95 08/27/2020    CHOLHDLRATIO 2.7 03/25/2013       LIVER ENZYME RESULTS:  Lab Results   Component Value Date    AST Unsatisfactory specimen - hemolyzed 08/27/2020    ALT 33 08/27/2020       CBC RESULTS:  Lab Results   Component Value Date    WBC 6.2 08/27/2020    RBC 4.50  08/27/2020    HGB 14.7 08/27/2020    HCT 42.9 08/27/2020    MCV 95 08/27/2020    MCH 32.7 08/27/2020    MCHC 34.3 08/27/2020    RDW 13.6 08/27/2020     08/27/2020       BMP RESULTS:  Lab Results   Component Value Date     08/27/2020    POTASSIUM 4.2 08/27/2020    CHLORIDE 109 08/27/2020    CO2 24 08/27/2020    ANIONGAP 6 08/27/2020     (H) 08/27/2020    BUN 20 08/27/2020    CR 0.85 08/27/2020    GFRESTIMATED 81 08/27/2020    GFRESTBLACK >90 08/27/2020    NNAMDI 9.0 08/27/2020        A1C RESULTS:  Lab Results   Component Value Date    A1C 5.6 08/27/2020       INR RESULTS:  Lab Results   Component Value Date    INR 0.96 03/14/2016    INR 1.58 (H) 08/22/2008           CC  Zac Martínez MD  [address] [facility] [address]  [place] [id number]

## 2020-09-11 NOTE — PROGRESS NOTES
Service Date: 09/11/2020      VIRTUAL VISIT       CARDIOLOGY OFFICE PROGRESS NOTE      HISTORY OF PRESENT ILLNESS:  I had the opportunity to see Mr. Romel Bonilla for a virtual video visit at [facility] in [place] for reevaluation of coronary artery disease and dyslipidemia.  Mr. Bonilla is an 79-year-old gentleman who had been followed for many years by Dr. Mathew before his departure.  He has a history of coronary artery disease and underwent angioplasty and stenting of the right coronary ostium in 03/2016.  His cardiac risk factors include only hypercholesterolemia.      When I saw him last year, he had reported an allergy to aspirin and Plavix and was taking Brilinta long term.  However, his aspirin allergy was atypical, and I suggested he try it again.  He was able to stop Brilinta and is now taking aspirin 81 mg daily without problems.  His LDL levels were up to 94 last year, and I suggested that he double his atorvastatin from 40-80 mg a day.  As a result, his cholesterol numbers are now much improved.  His LDL is 69, HDL 81, triglycerides 95 and total cholesterol 169.  His basic metabolic panel is normal, and hemoglobin is normal.      He continues to feel good.  He is having no concerning cardiac symptoms.  He plays golf 3 times a week and rides a cart, but walks up and down some hills on the fairway without too much difficulty.  He got a new knee in February, and that has improved his activity level.  Fortunately, it has not resulted in any new cardiac symptoms.      He is taking no medication for hypertension.  His blood pressure on 08/27/2020 was 127/78, heart rate 73, and his weight was about 213 pounds.      ALLERGIES:  Reported to Plavix.      IMPRESSIONS:  Mr. Romel Bonilla is an 79-year-old gentleman with a history of coronary artery disease, including stenting of the ostial right coronary artery in 2016.  Fortunately, he has done very well since then without any  significant cardiac symptoms or evidence of recurrent coronary artery disease.  At the time of his coronary angiogram, he had no other significant coronary blockages, although he did have some mild scattered plaque with at most 40% stenosis in the first diagonal artery.      He is on no medications to lower his blood pressure, and his blood pressure numbers consistently are excellent.  His cholesterol numbers are much improved after increasing his Lipitor.      I am glad that he is doing well and remains asymptomatic.  We talked about doing periodic stress testing, and I did not recommend that we do that this year since he is feeling well.  We will talk about that again next year when I meet with him.  We will repeat some laboratory studies at that time.  I will not make any medication changes, and we will contact him for that appointment next year.      cc:   Zac [place] [address]         JULIA BOND MD, [facility]             D: 2020   T: 2020   MT: [email or web address]      Name:     WESLEY SPARKS   MRN:      [id number]        Account:      [id number]   :      1940           Service Date: 2020      Document: [id number]

## 2020-09-21 ENCOUNTER — TELEPHONE (OUTPATIENT)
Dept: FAMILY MEDICINE | Facility: CLINIC | Age: 80
End: 2020-09-21

## 2020-09-21 DIAGNOSIS — D47.2 MONOCLONAL GAMMOPATHY: Primary | ICD-10-CM

## 2020-09-21 NOTE — TELEPHONE ENCOUNTER
"Patient is calling as per your conversation with him to retest \"On the protein electrophoresis which is a test to look at your proteins there was one very minor abnormality.  I am not at all worried about this but I do want to follow-up and repeat it prior to [place]. \"   But the orders are not in his chart for another appt .  The appt was made for next week the week of 9/28.  "

## 2020-09-21 NOTE — TELEPHONE ENCOUNTER
To PCP:     Pt is returning for repeat draw of protein Electrophoresis     Can this be ordered? Pt has an appt on 9/23, [place] is [place] on [place]    Thank you,   Laureen CROCKETT RN

## 2020-09-23 DIAGNOSIS — N40.0 BPH (BENIGN PROSTATIC HYPERPLASIA): ICD-10-CM

## 2020-09-23 DIAGNOSIS — Z87.448 H/O URETHRAL STRICTURE: ICD-10-CM

## 2020-09-23 DIAGNOSIS — D47.2 MONOCLONAL GAMMOPATHY: ICD-10-CM

## 2020-09-23 DIAGNOSIS — Z85.46 PERSONAL HISTORY OF MALIGNANT NEOPLASM OF PROSTATE: ICD-10-CM

## 2020-09-23 PROCEDURE — 00000402 ZZHCL STATISTIC TOTAL PROTEIN: Performed by: INTERNAL MEDICINE

## 2020-09-23 PROCEDURE — 81003 URINALYSIS AUTO W/O SCOPE: CPT | Performed by: INTERNAL MEDICINE

## 2020-09-23 PROCEDURE — 84165 PROTEIN E-PHORESIS SERUM: CPT | Performed by: INTERNAL MEDICINE

## 2020-09-23 PROCEDURE — 36415 COLL VENOUS BLD VENIPUNCTURE: CPT | Performed by: INTERNAL MEDICINE

## 2020-09-23 PROCEDURE — G0103 PSA SCREENING: HCPCS | Performed by: INTERNAL MEDICINE

## 2020-09-24 LAB — PSA SERPL-ACNC: <0.01 UG/L (ref 0–4)

## 2020-09-25 LAB
ALBUMIN SERPL ELPH-MCNC: 4 G/DL (ref 3.7–5.1)
ALPHA1 GLOB SERPL ELPH-MCNC: 0.3 G/DL (ref 0.2–0.4)
ALPHA2 GLOB SERPL ELPH-MCNC: 0.7 G/DL (ref 0.5–0.9)
B-GLOBULIN SERPL ELPH-MCNC: 0.9 G/DL (ref 0.6–1)
GAMMA GLOB SERPL ELPH-MCNC: 0.9 G/DL (ref 0.7–1.6)
M PROTEIN SERPL ELPH-MCNC: 0.1 G/DL
PROT PATTERN SERPL ELPH-IMP: ABNORMAL

## 2020-10-08 DIAGNOSIS — J44.9 CHRONIC OBSTRUCTIVE PULMONARY DISEASE, UNSPECIFIED COPD TYPE (H): ICD-10-CM

## 2020-10-08 RX ORDER — FLUTICASONE PROPIONATE AND SALMETEROL XINAFOATE 45; 21 UG/1; UG/1
AEROSOL, METERED RESPIRATORY (INHALATION)
Qty: 36 G | Refills: 2 | Status: SHIPPED | OUTPATIENT
Start: 2020-10-08

## 2020-10-14 DIAGNOSIS — J44.9 CHRONIC OBSTRUCTIVE PULMONARY DISEASE, UNSPECIFIED COPD TYPE (H): Primary | ICD-10-CM

## 2020-10-15 NOTE — TELEPHONE ENCOUNTER
predniSONE (DELTASONE) 10 MG tablet (Discontinued) 20 tablet 0 6/15/2020 8/27/2020           Last Written Prescription Date:  6/15/2020  Last Fill Quantity: 20,   # refills: 0  Last Office Visit: 8/27/2020  Future Office visit:   Unknown     Routing refill request to provider for review/approval because:  Drug not on the [facility], P or [facility] refill protocol or controlled substance

## 2020-10-16 NOTE — TELEPHONE ENCOUNTER
"TO PCP:     Pt called back     Is in [place] for the winter     Very congested     No trouble breathing yet - doesn't want any problems to happen though, has taken prednisone \"numerous times in the past\"     Had asked for a refill Prednisone to have on hand at his last visit but states PCP had advised instead he call when refill needed/symptoms happen     Does have his inhalers/using maintenance Advair but has not needed rescue Albuterol     Pt asking to have this sent to [place] pharmacy - also mentioned he has been on hold trying to get through for an hour. Apologized for the long phone wait times     Mary Ann HAMILTON RN    "

## 2020-10-16 NOTE — TELEPHONE ENCOUNTER
Prednisone has been prescribed in the past for Cough/Wheezing and asthma     Called pt to triage need for Prednisone- no answer, left VM to return call to clinic to discuss.     Thank you,   Laureen CROCKETT RN

## 2020-10-18 RX ORDER — PREDNISONE 20 MG/1
TABLET ORAL
Qty: 10 TABLET | Refills: 0 | Status: SHIPPED | OUTPATIENT
Start: 2020-10-18 | End: 2023-07-05

## 2020-10-19 DIAGNOSIS — I25.118 CORONARY ARTERY DISEASE INVOLVING NATIVE CORONARY ARTERY OF NATIVE HEART WITH OTHER FORM OF ANGINA PECTORIS (H): ICD-10-CM

## 2020-10-19 RX ORDER — ATORVASTATIN CALCIUM 80 MG/1
80 TABLET, FILM COATED ORAL AT BEDTIME
Qty: 90 TABLET | Refills: 3 | Status: SHIPPED | OUTPATIENT
Start: 2020-10-19 | End: 2021-10-25

## 2021-01-15 ENCOUNTER — HEALTH MAINTENANCE LETTER (OUTPATIENT)
Age: 81
End: 2021-01-15

## 2021-09-13 DIAGNOSIS — R05.9 COUGH: ICD-10-CM

## 2021-09-13 DIAGNOSIS — R06.2 WHEEZE: ICD-10-CM

## 2021-09-13 DIAGNOSIS — J45.21 MILD INTERMITTENT ASTHMA WITH EXACERBATION: ICD-10-CM

## 2021-09-15 RX ORDER — MONTELUKAST SODIUM 10 MG/1
10 TABLET ORAL AT BEDTIME
Qty: 90 TABLET | Refills: 0 | Status: SHIPPED | OUTPATIENT
Start: 2021-09-15 | End: 2022-04-29

## 2021-10-24 ENCOUNTER — HEALTH MAINTENANCE LETTER (OUTPATIENT)
Age: 81
End: 2021-10-24

## 2021-10-25 DIAGNOSIS — I25.118 CORONARY ARTERY DISEASE INVOLVING NATIVE CORONARY ARTERY OF NATIVE HEART WITH OTHER FORM OF ANGINA PECTORIS (H): ICD-10-CM

## 2021-10-25 RX ORDER — ATORVASTATIN CALCIUM 80 MG/1
80 TABLET, FILM COATED ORAL AT BEDTIME
Qty: 90 TABLET | Refills: 0 | Status: SHIPPED | OUTPATIENT
Start: 2021-10-25

## 2022-04-28 DIAGNOSIS — J45.21 MILD INTERMITTENT ASTHMA WITH EXACERBATION: ICD-10-CM

## 2022-04-28 DIAGNOSIS — R06.2 WHEEZE: ICD-10-CM

## 2022-04-28 DIAGNOSIS — R05.9 COUGH: ICD-10-CM

## 2022-04-29 RX ORDER — MONTELUKAST SODIUM 10 MG/1
TABLET ORAL
Qty: 45 TABLET | Refills: 0 | Status: SHIPPED | OUTPATIENT
Start: 2022-04-29

## 2022-04-29 NOTE — TELEPHONE ENCOUNTER
Routing refill request to provider for review/approval because:  Smiley given x1 and patient did not follow up, please advise  A break in medication  Patient needs to be seen because it has been more than 1 year since last office visit.  ACT does not meet protocol for RN refill

## 2022-05-02 NOTE — TELEPHONE ENCOUNTER
Pharmacy seeking 90 day fill of medication    LOV 8- [facility]    8-2-2021 patient appears to have established care with Mena Alcocer @ [facility]    Patient has [employer], excludes [facility] as providers    PCP updated     RT Popeye (R)

## 2022-10-15 ENCOUNTER — HEALTH MAINTENANCE LETTER (OUTPATIENT)
Age: 82
End: 2022-10-15

## 2022-12-03 ENCOUNTER — HEALTH MAINTENANCE LETTER (OUTPATIENT)
Age: 82
End: 2022-12-03

## 2023-07-05 ENCOUNTER — OFFICE VISIT (OUTPATIENT)
Dept: URGENT CARE | Facility: URGENT CARE | Age: 83
End: 2023-07-05
Payer: COMMERCIAL

## 2023-07-05 VITALS
DIASTOLIC BLOOD PRESSURE: 74 MMHG | OXYGEN SATURATION: 96 % | TEMPERATURE: 97.8 F | SYSTOLIC BLOOD PRESSURE: 126 MMHG | HEART RATE: 84 BPM

## 2023-07-05 DIAGNOSIS — L20.9 ATOPIC DERMATITIS, UNSPECIFIED TYPE: Primary | ICD-10-CM

## 2023-07-05 PROCEDURE — 99203 OFFICE O/P NEW LOW 30 MIN: CPT

## 2023-07-05 RX ORDER — TRIAMCINOLONE ACETONIDE 1 MG/G
OINTMENT TOPICAL 2 TIMES DAILY
Qty: 30 G | Refills: 0 | Status: SHIPPED | OUTPATIENT
Start: 2023-07-05 | End: 2023-07-12

## 2023-07-05 NOTE — PATIENT INSTRUCTIONS
Try the triamcinolone cream on all affected areas for 1 week. Also use lots of lotion and/or vaseline/aquaphor after showering to keep skin hydrated.    Do not use on the face, arm pits or genital region. Follow up with PCP and/or dermatology if no improvement.

## 2023-07-05 NOTE — PROGRESS NOTES
Assessment & Plan     Diagnosis:    ICD-10-CM    1. Atopic dermatitis, unspecified type  L20.9 triamcinolone (KENALOG) 0.1 % external ointment          Medical Decision Making:  Romel Bonilla is a 82 year old male who presents for evaluation of a rash.  This appears consistent with a dermatitis.  A broad differential was considered including infection associated with rash, SBI, cellulitis, atopic dermatitis, allergic dermatitis, contact dermatitis, chemical dermatitis, lice, scabies, environmental, etc.  I am favoring atopic dermatitis at this time given time frame and locations on the body.  Outpatient regimen as noted above.  See primary this week for recheck.     Bhavin Melendez PA-C  [facility]    Subjective     Romel Bonilla is a 82 year old male who presents to clinic today for the following health issues:  Chief Complaint   Patient presents with     Rash     Arms, chest, shoulders x a week, itching        HPI  Patient notes an itchy rash on his forearms and anterior chest for the past week, began slowly, does not seem to be spreading at this point has been stable for the past few days.  He notes no new allergens, bug bites, tick bites, medications, detergents/skin care products etc.  No fevers, chest pain, shortness of breath, tongue or lip swelling, rashes elsewhere on the body, joint pains or other concerns.      Review of Systems    See HPI    Objective      Vitals: /74   Pulse 84   Temp 97.8  F (36.6  C) (Tympanic)   SpO2 96%   Resp: 16    Patient Vitals for the past 24 hrs:   BP Temp Temp src Pulse SpO2   07/05/23 0958 126/74 97.8  F (36.6  C) Tympanic 84 96 %       Vital signs reviewed by: Bhavin Melendez PA-C    Physical Exam   Constitutional: Patient is alert and cooperative. No acute distress.   Mouth: Mucous membranes are moist. Normal tongue and tonsil. Posterior oropharynx is clear.  Cardiovascular: Regular rate and rhythm  Pulmonary/Chest: Lungs are clear to  auscultation throughout. Effort normal. No respiratory distress. No wheezes, rales or rhonchi  Neurological: Alert and oriented x3.   Skin: Erythematous slightly scaly patchy rash forearms and anterior chest wall.  No vesicles, blistering, petechia or purpura.  No drainage or weeping lesions.  Psychiatric:The patient has a normal mood and affect.       Bhavin Melendez PA-C, July 5, 2023

## 2023-07-14 ENCOUNTER — HOSPITAL ENCOUNTER (EMERGENCY)
Facility: CLINIC | Age: 83
Discharge: HOME | End: 2023-07-14
Attending: EMERGENCY MEDICINE | Admitting: EMERGENCY MEDICINE
Payer: COMMERCIAL

## 2023-07-14 VITALS
WEIGHT: 235 LBS | OXYGEN SATURATION: 96 % | SYSTOLIC BLOOD PRESSURE: 123 MMHG | HEIGHT: 70 IN | TEMPERATURE: 97.8 F | DIASTOLIC BLOOD PRESSURE: 76 MMHG | RESPIRATION RATE: 16 BRPM | HEART RATE: 89 BPM | BODY MASS INDEX: 33.64 KG/M2

## 2023-07-14 DIAGNOSIS — N30.00 ACUTE CYSTITIS WITHOUT HEMATURIA: ICD-10-CM

## 2023-07-14 DIAGNOSIS — R53.1 GENERALIZED WEAKNESS: ICD-10-CM

## 2023-07-14 DIAGNOSIS — R53.81 PHYSICAL DECONDITIONING: ICD-10-CM

## 2023-07-14 LAB
ALBUMIN SERPL BCG-MCNC: 4.2 G/DL (ref 3.5–5.2)
ALBUMIN UR-MCNC: 30 MG/DL
ALP SERPL-CCNC: 79 U/L (ref 40–129)
ALT SERPL W P-5'-P-CCNC: 41 U/L (ref 0–70)
ANION GAP SERPL CALCULATED.3IONS-SCNC: 13 MMOL/L (ref 7–15)
APPEARANCE UR: CLEAR
AST SERPL W P-5'-P-CCNC: 30 U/L (ref 0–45)
BASOPHILS # BLD AUTO: 0 10E3/UL (ref 0–0.2)
BASOPHILS NFR BLD AUTO: 0 %
BILIRUB SERPL-MCNC: 0.5 MG/DL
BILIRUB UR QL STRIP: NEGATIVE
BUN SERPL-MCNC: 15.9 MG/DL (ref 8–23)
CALCIUM SERPL-MCNC: 9.7 MG/DL (ref 8.8–10.2)
CHLORIDE SERPL-SCNC: 104 MMOL/L (ref 98–107)
COLOR UR AUTO: YELLOW
CREAT SERPL-MCNC: 1.11 MG/DL (ref 0.67–1.17)
DEPRECATED HCO3 PLAS-SCNC: 20 MMOL/L (ref 22–29)
EOSINOPHIL # BLD AUTO: 0.2 10E3/UL (ref 0–0.7)
EOSINOPHIL NFR BLD AUTO: 3 %
ERYTHROCYTE [DISTWIDTH] IN BLOOD BY AUTOMATED COUNT: 12.6 % (ref 10–15)
GFR SERPL CREATININE-BSD FRML MDRD: 65 ML/MIN/1.73M2
GLUCOSE SERPL-MCNC: 183 MG/DL (ref 70–99)
GLUCOSE UR STRIP-MCNC: NEGATIVE MG/DL
HCT VFR BLD AUTO: 42.8 % (ref 40–53)
HGB BLD-MCNC: 14.3 G/DL (ref 13.3–17.7)
HGB UR QL STRIP: ABNORMAL
HOLD SPECIMEN: NORMAL
HOLD SPECIMEN: NORMAL
HYALINE CASTS: 6 /LPF
IMM GRANULOCYTES # BLD: 0 10E3/UL
IMM GRANULOCYTES NFR BLD: 0 %
KETONES UR STRIP-MCNC: NEGATIVE MG/DL
LEUKOCYTE ESTERASE UR QL STRIP: ABNORMAL
LYMPHOCYTES # BLD AUTO: 1.2 10E3/UL (ref 0.8–5.3)
LYMPHOCYTES NFR BLD AUTO: 18 %
MCH RBC QN AUTO: 32.3 PG (ref 26.5–33)
MCHC RBC AUTO-ENTMCNC: 33.4 G/DL (ref 31.5–36.5)
MCV RBC AUTO: 97 FL (ref 78–100)
MONOCYTES # BLD AUTO: 0.5 10E3/UL (ref 0–1.3)
MONOCYTES NFR BLD AUTO: 8 %
MUCOUS THREADS #/AREA URNS LPF: PRESENT /LPF
NEUTROPHILS # BLD AUTO: 4.4 10E3/UL (ref 1.6–8.3)
NEUTROPHILS NFR BLD AUTO: 71 %
NITRATE UR QL: NEGATIVE
NRBC # BLD AUTO: 0 10E3/UL
NRBC BLD AUTO-RTO: 0 /100
PH UR STRIP: 6 [PH] (ref 5–7)
PLATELET # BLD AUTO: 248 10E3/UL (ref 150–450)
POTASSIUM SERPL-SCNC: 4.3 MMOL/L (ref 3.4–5.3)
PROT SERPL-MCNC: 7.3 G/DL (ref 6.4–8.3)
RBC # BLD AUTO: 4.43 10E6/UL (ref 4.4–5.9)
RBC URINE: 26 /HPF
SODIUM SERPL-SCNC: 137 MMOL/L (ref 136–145)
SP GR UR STRIP: 1.03 (ref 1–1.03)
UROBILINOGEN UR STRIP-MCNC: NORMAL MG/DL
WBC # BLD AUTO: 6.3 10E3/UL (ref 4–11)
WBC CLUMPS #/AREA URNS HPF: PRESENT /HPF
WBC URINE: 79 /HPF

## 2023-07-14 PROCEDURE — 80053 COMPREHEN METABOLIC PANEL: CPT | Performed by: EMERGENCY MEDICINE

## 2023-07-14 PROCEDURE — 99283 EMERGENCY DEPT VISIT LOW MDM: CPT

## 2023-07-14 PROCEDURE — 250N000013 HC RX MED GY IP 250 OP 250 PS 637: Performed by: EMERGENCY MEDICINE

## 2023-07-14 PROCEDURE — 51702 INSERT TEMP BLADDER CATH: CPT

## 2023-07-14 PROCEDURE — 81001 URINALYSIS AUTO W/SCOPE: CPT | Performed by: EMERGENCY MEDICINE

## 2023-07-14 PROCEDURE — 87086 URINE CULTURE/COLONY COUNT: CPT | Performed by: EMERGENCY MEDICINE

## 2023-07-14 PROCEDURE — 85004 AUTOMATED DIFF WBC COUNT: CPT | Performed by: EMERGENCY MEDICINE

## 2023-07-14 PROCEDURE — 36415 COLL VENOUS BLD VENIPUNCTURE: CPT | Performed by: EMERGENCY MEDICINE

## 2023-07-14 RX ORDER — CIPROFLOXACIN 500 MG/1
500 TABLET, FILM COATED ORAL 2 TIMES DAILY
Qty: 10 TABLET | Refills: 0 | Status: SHIPPED | OUTPATIENT
Start: 2023-07-14 | End: 2023-07-19

## 2023-07-14 RX ORDER — CIPROFLOXACIN 500 MG/1
500 TABLET, FILM COATED ORAL ONCE
Status: COMPLETED | OUTPATIENT
Start: 2023-07-14 | End: 2023-07-14

## 2023-07-14 RX ADMIN — CIPROFLOXACIN HYDROCHLORIDE 500 MG: 500 TABLET, FILM COATED ORAL at 17:10

## 2023-07-14 ASSESSMENT — ACTIVITIES OF DAILY LIVING (ADL): ADLS_ACUITY_SCORE: 35

## 2023-07-14 NOTE — ED TRIAGE NOTES
Pt feeling weak and needed help getting back to car while out at [employer].  Feels similar to an incident that happened 6 months ago.     Triage Assessment     Row Name 07/14/23 1323       Triage Assessment (Adult)    Airway WDL WDL       Respiratory WDL    Respiratory WDL WDL       Skin Circulation/Temperature WDL    Skin Circulation/Temperature WDL WDL       Cardiac WDL    Cardiac WDL WDL       Peripheral/Neurovascular WDL    Peripheral Neurovascular WDL WDL       Cognitive/Neuro/Behavioral WDL    Cognitive/Neuro/Behavioral WDL WDL

## 2023-07-14 NOTE — ED PROVIDER NOTES
"  History     Chief Complaint:  Generalized Weakness       HPI   Romel Bonilla is a 82 year old male with a history of COPD and CAD who presents with lower extremity weakness. He has experienced this about a year ago while golfing, when he bent over and fell over into the turf. Today's symptoms are similar, and he began to feel weak in the legs. He woke up this morning feeling normal and had breakfast, then went to the mall and was ambulating normally. He went to the restroom and had a large bowel movement when the generalized weakness began. Here, he describes \"not wanting to walk too far\" with the weakness predominantly in his hips and legs. He states the exertion he did today is not significantly different than other days.    Of note, the patient self-caths every other day to help with urinary retention. He denies dysuria or fever. Additionally, he mentions that he has been highly active by going on long walks prior to the golfing incident mentioned above. Since this incident, he has significantly decreased his physical activity and has been spending more time sitting at home.      Independent Historian:   None - Patient Only    Review of External Notes:   None     Medications:    Aspirin  Lipitor  Zyrtec  Vesicare  Singulair    Past Medical History:    Anxiety  Asthma  Bronchiectasis  COPD  GERD  CAD  Hypertension  Hyperlipidemia  Prostate cancer  Osteoarthrosis    Past Surgical History:    Cystoscopy x2  Left heart catheterization  Cataract surgery  Rotator cuff repair  Hernia repair  Right knee arthroscopy and meniscectomy     Physical Exam     Patient Vitals for the past 24 hrs:   BP Temp Temp src Pulse Resp SpO2 Height Weight   07/14/23 1624 123/76 -- -- 89 16 96 % -- --   07/14/23 1320 123/80 97.8  F (36.6  C) Temporal 74 20 91 % 1.778 m (5' 10\") 106.6 kg (235 lb)        Physical Exam  Eye:  Pupils are equal, round, and reactive.  Extraocular movements intact.    ENT:  No rhinorrhea.  Moist mucus " membranes.  Normal tongue and tonsil.    Cardiac:  Regular rate and rhythm.  No murmurs, gallops, or rubs.    Pulmonary:  Clear to auscultation bilaterally.  No wheezes, rales, or rhonchi.    Abdomen:  Positive bowel sounds.  Abdomen is soft and non-distended, without focal tenderness.    Musculoskeletal:  Normal movement of all extremities without evidence for deficit.    Skin:  Warm and dry without rashes.    Neurologic:  Non-focal exam without asymmetric weakness or numbness.     Psychiatric:  Normal affect with appropriate interaction with examiner.    Emergency Department Course     Laboratory:  Labs Ordered and Resulted from Time of ED Arrival to Time of ED Departure   COMPREHENSIVE METABOLIC PANEL - Abnormal       Result Value    Sodium 137      Potassium 4.3      Chloride 104      Carbon Dioxide (CO2) 20 (*)     Anion Gap 13      Urea Nitrogen 15.9      Creatinine 1.11      Calcium 9.7      Glucose 183 (*)     Alkaline Phosphatase 79      AST 30      ALT 41      Protein Total 7.3      Albumin 4.2      Bilirubin Total 0.5      GFR Estimate 65     UA MACROSCOPIC WITH REFLEX TO MICRO AND CULTURE - Abnormal    Color Urine Yellow      Appearance Urine Clear      Glucose Urine Negative      Bilirubin Urine Negative      Ketones Urine Negative      Specific Gravity Urine 1.027      Blood Urine Small (*)     pH Urine 6.0      Protein Albumin Urine 30 (*)     Urobilinogen Urine Normal      Nitrite Urine Negative      Leukocyte Esterase Urine Large (*)     WBC Clumps Urine Present (*)     Mucus Urine Present (*)     RBC Urine 26 (*)     WBC Urine 79 (*)     Hyaline Casts Urine 6 (*)    CBC WITH PLATELETS AND DIFFERENTIAL    WBC Count 6.3      RBC Count 4.43      Hemoglobin 14.3      Hematocrit 42.8      MCV 97      MCH 32.3      MCHC 33.4      RDW 12.6      Platelet Count 248      % Neutrophils 71      % Lymphocytes 18      % Monocytes 8      % Eosinophils 3      % Basophils 0      % Immature Granulocytes 0      NRBCs  "per 100 WBC 0      Absolute Neutrophils 4.4      Absolute Lymphocytes 1.2      Absolute Monocytes 0.5      Absolute Eosinophils 0.2      Absolute Basophils 0.0      Absolute Immature Granulocytes 0.0      Absolute NRBCs 0.0     URINE CULTURE        Emergency Department Course & Assessments:    Interventions:  Medications   ciprofloxacin (CIPRO) tablet 500 mg (500 mg Oral $Given 7/14/23 1716)        Assessments:  1335 I obtained history and examined the patient, as noted above.  1633 I rechecked and updated the patient.    Independent Interpretation (X-rays, CTs, rhythm strip):  None    Consultations/Discussion of Management or Tests:  None     Social Determinants of Health affecting care:   None    Disposition:  The patient was discharged to home.     Impression & Plan      Medical Decision Making:  This very pleasant 82-year-old man presents to us with general weakness.  He notes that he has had a decline over the past 3 years, walking as far as 3 miles a day as of 3 years ago.  He had some weakness while playing golf a couple years ago and therefore has stopped doing much activity at all.  His wife notes that he \"spends most of his time in the TV Pixie lounger.\"  Today, he and his wife went to the mall to purchase shoes and he admits that this is a longer walk than what is typically performing.  After walking all the way into the shoe store and finding the shoes, he felt very weak trying to walk back to the car and needed some helpful, holding onto his wife.  He became concerned that he would not be able to get out of the car when they got home and therefore they elected to come to the emergency department.  He describes bilateral leg weakness \"as though they are wet noodles.\"  He denies focal weakness or numbness.  He denies trauma.    On my exam, he appears clinically well.  He has normal vital signs and shows a normal neurologic exam.  Laboratory investigation is pursued which is unremarkable.  He does show " evidence of urinary tract infection and tells me that he has to self catheterize every other day due to BPH.  He denies having any fevers or dysuria.    It is certainly possible that a UTI may be exacerbating some of his weakness.  I favor more of a deconditioning issue as well.  He feels much improved.  I was able to get him out of bed and ambulate him up and down the anand personally without him showing any deficit or requirement for assistance.  We did discuss admission to the hospital for placement into rehab.  He prefers to be discharged home.  We discussed starting an exercise program, starting simply with walking up and down the driveway and then up and down the street some.  He notes that he has a cane and walking sticks at home.  We will start him on some antibiotics here.  Otherwise, he was advised to follow-up closely with his primary team next week with immediate return to us for any worsening of condition or other emergent concerns.    Diagnosis:    ICD-10-CM    1. Generalized weakness  R53.1       2. Acute cystitis without hematuria  N30.00       3. Physical deconditioning  R53.81            Discharge Medications:  Discharge Medication List as of 7/14/2023  5:08 PM      START taking these medications    Details   ciprofloxacin (CIPRO) 500 MG tablet Take 1 tablet (500 mg) by mouth 2 times daily for 5 days, Disp-10 tablet, R-0, E-Prescribe              Scribe Disclosure:  I, Mack Campos, am serving as a scribe at 4:15 PM on 7/14/2023 to document services personally performed by Trierweiler, Chad A, MD based on my observations and the provider's statements to me.      Trierweiler, Chad A, MD  07/15/23 1169

## 2023-07-14 NOTE — ED NOTES
"Rapid Assessment Note    History:   Romel Bonilla is a 82 year old male who presents with lower extremity weakness. He has experienced this about a year ago while golfing, when he bent over and fell over into the turf. Today's symptoms are similar, and he began to feel weak in the legs. He woke up this morning feeling normal and had breakfast, then went to the mall and was ambulating normally. He went to the restroom and had a large bowel movement when the generalized weakness began. Here, he describes \"not wanting to walk too far\" with the weakness predominantly in his hips and legs. He states the exertion he did today is not significantly different than other days.    Exam:   General:  Alert, interactive  Cardiovascular:  Well perfused  Lungs:  No respiratory distress, no accessory muscle use  Neuro:  Moving all 4 extremities  Skin:  Warm, dry  Psych:  Normal affect    Plan of Care:   This pleasant 82-year-old presents with generalized weakness after walking into the mall and trying on some shoes.  He freely admits this is more walking than what he typically does.  He describes just being generally weak throughout his legs and torso.  He denies any pain or any other clear explanation for this.  There was no trauma.  Labs will be sent off looking for metabolic derangement or other emergencies, though I favor more of an issue of deconditioning.      I evaluated the patient and developed an initial plan of care. I discussed this plan and explained that I, or one of my partners, would be returning to complete the evaluation.         I, Mack Campos, am serving as a scribe to document services personally performed by Trierweiler, Chad A, MD based on my observations and the provider's statements to me.    7/14/2023  [employer]    Portions of this medical record were completed by a scribe. UPON MY REVIEW AND AUTHENTICATION BY ELECTRONIC SIGNATURE, this confirms (a) I performed the " applicable clinical services, and (b) the record is accurate.      Trierweiler, Chad A, MD  07/14/23 1940

## 2023-07-15 NOTE — RESULT ENCOUNTER NOTE
[facility] Emergency Dept discharge antibiotic (if prescribed): Ciprofloxacin (Cipro) 500 mg tablet, 1 tablet (500 mg) by mouth 2 times daily for 5 days.   Date of Rx (if applicable):  7/14/23  No changes in treatment per [facility] ED Lab Result Urine culture protocol.

## 2023-07-16 LAB — BACTERIA UR CULT: NORMAL

## 2023-07-16 NOTE — RESULT ENCOUNTER NOTE
Final urine culture report is negative.  Adult Negative Urine culture parameters per protocol: Any # Urogenital single or mixed organism, <10,000 col/ml single organism (cath/midstream), and > 3 organisms (No susceptibilities performed).  [facility] Emergency Dept discharge antibiotic prescribed (If applicable): Ciprofloxacin  Treatment recommendations per [facility] ED Lab Result Urine Culture protocol.

## 2023-08-30 ENCOUNTER — OFFICE VISIT (OUTPATIENT)
Dept: URGENT CARE | Facility: URGENT CARE | Age: 83
End: 2023-08-30
Payer: COMMERCIAL

## 2023-08-30 VITALS
WEIGHT: 241 LBS | SYSTOLIC BLOOD PRESSURE: 140 MMHG | RESPIRATION RATE: 14 BRPM | TEMPERATURE: 97.8 F | OXYGEN SATURATION: 95 % | HEART RATE: 87 BPM | DIASTOLIC BLOOD PRESSURE: 63 MMHG | BODY MASS INDEX: 34.58 KG/M2

## 2023-08-30 DIAGNOSIS — J06.9 UPPER RESPIRATORY TRACT INFECTION, UNSPECIFIED TYPE: Primary | ICD-10-CM

## 2023-08-30 DIAGNOSIS — J44.9 CHRONIC OBSTRUCTIVE PULMONARY DISEASE, UNSPECIFIED COPD TYPE (H): ICD-10-CM

## 2023-08-30 PROCEDURE — 87635 SARS-COV-2 COVID-19 AMP PRB: CPT | Performed by: PHYSICIAN ASSISTANT

## 2023-08-30 PROCEDURE — 99213 OFFICE O/P EST LOW 20 MIN: CPT

## 2023-08-30 RX ORDER — BENZONATATE 200 MG/1
200 CAPSULE ORAL 3 TIMES DAILY PRN
Qty: 21 CAPSULE | Refills: 0 | Status: SHIPPED | OUTPATIENT
Start: 2023-08-30 | End: 2023-09-06

## 2023-08-30 RX ORDER — METHYLPREDNISOLONE 4 MG
TABLET, DOSE PACK ORAL
Qty: 21 TABLET | Refills: 0 | Status: SHIPPED | OUTPATIENT
Start: 2023-08-30

## 2023-08-30 RX ORDER — AZITHROMYCIN 250 MG/1
TABLET, FILM COATED ORAL
Qty: 6 TABLET | Refills: 0 | Status: SHIPPED | OUTPATIENT
Start: 2023-08-30 | End: 2023-09-04

## 2023-08-30 NOTE — PROGRESS NOTES
Upper respiratory tract infection, unspecified type  - Symptomatic COVID-19 Virus (Coronavirus) by PCR Nose  - benzonatate (TESSALON) 200 MG capsule; Take 1 capsule (200 mg) by mouth 3 times daily as needed for cough  - methylPREDNISolone (MEDROL DOSEPAK) 4 MG tablet therapy pack; Follow Package Directions  - azithromycin (ZITHROMAX) 250 MG tablet; Take 2 tablets (500 mg) by mouth daily for 1 day, THEN 1 tablet (250 mg) daily for 4 days.    Chronic obstructive pulmonary disease, unspecified COPD type (H)  - benzonatate (TESSALON) 200 MG capsule; Take 1 capsule (200 mg) by mouth 3 times daily as needed for cough  - methylPREDNISolone (MEDROL DOSEPAK) 4 MG tablet therapy pack; Follow Package Directions  - azithromycin (ZITHROMAX) 250 MG tablet; Take 2 tablets (500 mg) by mouth daily for 1 day, THEN 1 tablet (250 mg) daily for 4 days.    Age 12 months or more  Okay to use Zarbee's   Okay to use Rx Children Tylenol if prescribed (Dose based on weight)    Age 2-12:   Okay to use Children Motrin or Tylenol over the counter.    Adults:  Okay to take acetaminophen 500 mg- 2 tabs (Total of 1000 mg) every 8 hrs   Okay to take ibuprofen 200 mg- 3 tabs (Total of 600 mg) every 6 hours        Okay to use Neti pot for sinus lavage up to three times daily for congestion and sinus pressure if present. Daily hot shower can be beneficial for congestion and body aches. Okay to use bedroom vaporizer or humidifier if symptoms are worse at night. Nightly Vicks Vapor rub and 5-10 mg of Melatonin okay to use for sleep.     Over the counter cough medication and decongestants okay if not prescribed by me during this visit. For homeopathic alternatives to cough syrup and decongestant, feel free to try Elderberry extract.    Okay to use salt water gargles, warm tea (or warm water with lemon and honey), and lozenges for any throat discomfort. Chloraseptic spray is also highly encourages for throat pain/irritation.     Patient will need to get  plenty of rest and drink at least 1.5-2 liters of fluids daily for adults and 1-1.5 liters for children. If vomiting and not tolerating liquids for more than 24 hrs, please go to your nearest emergency department for IV fluids and further treatment.     Patient is not contagious after 1 week from start of symptoms. If possible, wear mask for first 7 days. Wash hands regularly and vigorously for 30 seconds often.     Patient was advised to return to clinic for reevaluation (either UC or PCP) if symptoms do not improve in 5 days. Patient educated on red flag symptoms and asked to go directly to the ED if these symptoms present themselves.       JUAN PABLO [facility]    Subjective   82 year old who presents to clinic today for the following health issues:    URI       HPI     Acute Illness  Acute illness concerns: Cough productive yellowish mucus, runny nose x few days   Onset/Duration: 3-4 days  Symptoms:  Fever: No  Chills/Sweats: No  Headache (location?): No  Sinus Pressure: No  Conjunctivitis:  No  Ear Pain: no  Rhinorrhea: YES  Congestion: YES  Sore Throat: No  Cough: YES  Wheeze: YES and some shortness of breath but the patient has a history of asthma/copd?   Decreased Appetite: No  Nausea: No  Vomiting: No  Diarrhea: No  Dysuria/Freq.: No  Dysuria or Hematuria: No  Fatigue/Achiness: No  Sick/Strep Exposure: No   Therapies tried and outcome: Cough syrup. Unsure what     Review of Systems   Review of Systems   See HPI    Objective    Temp: 97.8  F (36.6  C) Temp src: Tympanic BP: (!) 140/63 Pulse: 87   Resp: 14 SpO2: 95 %       Physical Exam   Physical Exam  Constitutional:       General: He is not in acute distress.     Appearance: Normal appearance. He is normal weight. He is not ill-appearing, toxic-appearing or diaphoretic.   HENT:      Head: Normocephalic and atraumatic.      Right Ear: Tympanic membrane, ear canal and external ear normal. There is no impacted cerumen.      Left  Ear: Tympanic membrane, ear canal and external ear normal. There is no impacted cerumen.      Nose: Congestion and rhinorrhea present.      Mouth/Throat:      Pharynx: No oropharyngeal exudate or posterior oropharyngeal erythema.   Cardiovascular:      Rate and Rhythm: Normal rate and regular rhythm.      Pulses: Normal pulses.      Heart sounds: Normal heart sounds. No murmur heard.     No friction rub. No gallop.   Pulmonary:      Effort: Pulmonary effort is normal. No respiratory distress.      Breath sounds: No stridor. No wheezing, rhonchi or rales.   Chest:      Chest wall: No tenderness.   Lymphadenopathy:      Cervical: No cervical adenopathy.   Neurological:      Mental Status: He is alert.   Psychiatric:         Mood and Affect: Mood normal.         Behavior: Behavior normal.         Thought Content: Thought content normal.         Judgment: Judgment normal.          No results found for this or any previous visit (from the past 24 hour(s)).

## 2023-08-31 ENCOUNTER — TELEPHONE (OUTPATIENT)
Dept: NURSING | Facility: CLINIC | Age: 83
End: 2023-08-31
Payer: COMMERCIAL

## 2023-08-31 LAB — SARS-COV-2 RNA RESP QL NAA+PROBE: POSITIVE

## 2023-08-31 NOTE — TELEPHONE ENCOUNTER
Coronavirus (COVID-19) Notification    Caller Name (Patient, parent, daughter/son, grandparent, etc)  patient    Reason for call  Notify of Positive Coronavirus (COVID-19) lab results, assess symptoms,  review [facility] recommendations    Lab Result    Lab test:  2019-nCoV rRt-PCR or SARS-CoV-2 PCR    Oropharyngeal AND/OR nasopharyngeal swabs is POSITIVE for 2019-nCoV RNA/SARS-COV-2 PCR (COVID-19 virus)      Gather patient reported symptoms   Assessment   Current Symptoms at time of phone call, reported by patient: (if no symptoms, document: No symptoms] Cold symptoms, cough, congestion   Date of symptom(s) onset (if applicable) 10 days ago     If at time of call, Patients symptoms have worsened, the Patient should contact 911 or have someone drive them to Emergency Dept promptly:    If Patient calling 911, inform 911 personal that you have tested positive for the Coronavirus (COVID-19).  Place mask on and await 911 to arrive.  If Emergency Dept, If possible, please have another adult drive you to the Emergency Dept but you need to wear mask when in contact with other people.      Treatment Options:   Is patient interested in discussing COVID treatment? No.        Review information with Patient    Your result was positive. This means you have COVID-19 (coronavirus).    How can I protect others?    These guidelines are for isolating before returning to work, school or .    If you DO have symptoms  Stay home and away from others   For at least 5 days after your symptoms started, AND  You are fever free for 24 hours (with no medicine that reduces fever), AND  Your other symptoms are better  Wear a mask for 10 full days anytime you are around others    If you DON'T have symptoms  Stay home and away from others for at least 5 days after your positive test  Wear a mask for 10 full days anytime you are around others    There may be different guidelines for healthcare facilities.  Please check with the  specific sites before arriving.    If you have been told by a doctor that you were severely ill with COVID-19 or are immunocompromised, you should isolate for at least 10 days.    You should not go back to work until you meet the guidelines above for ending your home isolation. You don't need to be retested for COVID-19 before going back to work--studies show that you won't spread the virus if it's been at least 10 days since your symptoms started (or 20 days, if you have a weak immune system).    Employers, schools, and daycares: This is an official notice for this person's medical guidelines for returning in-person.  They must meet the above guidelines before going back to work, school or  in person.    You will receive a positive COVID-19 letter via [employer] or the mail soon with additional self-care information.    Would you like me to review some of that information with you now?  No    If you were tested for an upcoming procedure, please contact your provider for next steps.    Naina Luong

## 2023-10-29 ENCOUNTER — HEALTH MAINTENANCE LETTER (OUTPATIENT)
Age: 83
End: 2023-10-29

## 2024-03-26 NOTE — PROGRESS NOTES
Romel is an 78-year-old gentleman who was treated for prostate cancer many years ago with seed implants and external radiation.  His PSA remains undetectable.  Last year he underwent dilation of a urethral stricture and removal of the prostatic calculus and cautery of some prostatic veins.  He has had no bleeding since.  He is voiding comfortably.  Other past medical history: History of anxiety, ascending aortic dilation, asthma, bronchiectasis, coronary artery disease, hypertension, GERD, hoarseness, hyperglycemia, hyperlipidemia, lung nodule, osteoarthrosis, former smoker.  Surgeries reviewed  Medications: No change  Allergies: Cephalosporins, aspirin, Plavix  Review of systems: As above  Exam: Alert and oriented, normal appearance, normal vital signs.  Small right hand hematoma from blood draw.  Normal respirations, neuro grossly intact  Assessment: No evidence of recurrent prostate cancer, no hematuria  Plan: See me yearly with PSA and for urinalysis.  Ice pack to right hand every hour for 20 minutes for the next 2 hours  
General

## 2024-11-07 NOTE — PATIENT INSTRUCTIONS
-Urinalysis   - Urine cytology to look for abnormal cells.  - CT scan: Please call [phone] to schedule this at the [facility] ([place]) or [facility] ([place]).   - Cystoscopy with the  urologist to evaluate the interior of the bladder. Follow up as recommended by the urologist.     Left VM letting pt know I potentially have a date before EOY for his surgery if he is interested and to call us back.

## 2024-12-21 ENCOUNTER — HEALTH MAINTENANCE LETTER (OUTPATIENT)
Age: 84
End: 2024-12-21

## (undated) DEVICE — CATH FOLEY COUDE 18FR 5ML LATEX

## (undated) DEVICE — SOL WATER IRRIG 1000ML BOTTLE 2F7114

## (undated) DEVICE — WIRE GLIDE 0.035"X150CM VASC GR3506

## (undated) DEVICE — BAG CYSTO TABLE DRAIN

## (undated) DEVICE — Device

## (undated) DEVICE — PACK CYSTOSCOPY SBA15CYFSI

## (undated) DEVICE — PAD CHUX UNDERPAD 23X24" 7136

## (undated) DEVICE — ESU CORD MONOPOLAR HIGH FREQUENCY 26006M-D/10

## (undated) DEVICE — SOL WATER IRRIG 3000ML BAG 2B7117

## (undated) DEVICE — GLOVE PROTEXIS W/NEU-THERA 7.5  2D73TE75

## (undated) RX ORDER — LIDOCAINE HYDROCHLORIDE 20 MG/ML
INJECTION, SOLUTION EPIDURAL; INFILTRATION; INTRACAUDAL; PERINEURAL
Status: DISPENSED
Start: 2018-09-19

## (undated) RX ORDER — REGADENOSON 0.08 MG/ML
INJECTION, SOLUTION INTRAVENOUS
Status: DISPENSED
Start: 2017-05-15

## (undated) RX ORDER — ONDANSETRON 2 MG/ML
INJECTION INTRAMUSCULAR; INTRAVENOUS
Status: DISPENSED
Start: 2018-09-19

## (undated) RX ORDER — CEFAZOLIN SODIUM 2 G/100ML
INJECTION, SOLUTION INTRAVENOUS
Status: DISPENSED
Start: 2018-09-19

## (undated) RX ORDER — FENTANYL CITRATE 50 UG/ML
INJECTION, SOLUTION INTRAMUSCULAR; INTRAVENOUS
Status: DISPENSED
Start: 2018-09-19